# Patient Record
Sex: FEMALE | Race: BLACK OR AFRICAN AMERICAN | Employment: FULL TIME | URBAN - METROPOLITAN AREA
[De-identification: names, ages, dates, MRNs, and addresses within clinical notes are randomized per-mention and may not be internally consistent; named-entity substitution may affect disease eponyms.]

---

## 2017-02-18 ENCOUNTER — HOSPITAL ENCOUNTER (EMERGENCY)
Age: 62
Discharge: HOME | End: 2017-02-18
Attending: FAMILY MEDICINE

## 2017-02-18 VITALS
RESPIRATION RATE: 18 BRPM | HEIGHT: 66 IN | WEIGHT: 107 LBS | HEART RATE: 82 BPM | BODY MASS INDEX: 17.19 KG/M2 | OXYGEN SATURATION: 98 % | DIASTOLIC BLOOD PRESSURE: 94 MMHG | SYSTOLIC BLOOD PRESSURE: 141 MMHG | TEMPERATURE: 98.4 F

## 2017-02-18 DIAGNOSIS — J06.9 ACUTE UPPER RESPIRATORY INFECTION: Primary | ICD-10-CM

## 2017-02-18 RX ORDER — LORATADINE 10 MG/1
10 TABLET ORAL DAILY
Qty: 15 TAB | Refills: 0 | Status: SHIPPED | OUTPATIENT
Start: 2017-02-18 | End: 2020-02-24

## 2017-02-18 RX ORDER — BENZONATATE 100 MG/1
100 CAPSULE ORAL
Qty: 21 CAP | Refills: 0 | Status: SHIPPED | OUTPATIENT
Start: 2017-02-18 | End: 2017-02-25

## 2017-02-18 RX ORDER — FLUTICASONE PROPIONATE 50 MCG
2 SPRAY, SUSPENSION (ML) NASAL DAILY
Qty: 1 BOTTLE | Refills: 0 | Status: SHIPPED | OUTPATIENT
Start: 2017-02-18

## 2017-02-18 NOTE — DISCHARGE INSTRUCTIONS

## 2017-02-18 NOTE — UC PROVIDER NOTE
Patient is a 64 y.o. female presenting with nasal congestion. The history is provided by the patient. Nasal Congestion   This is a new problem. The current episode started yesterday. The problem occurs constantly. The problem has not changed since onset. Pertinent negatives include no headaches and no shortness of breath. Associated symptoms comments: Runny nose/ cough. The symptoms are aggravated by sneezing and coughing. Treatments tried: decongestants. The treatment provided no relief. Past Medical History   Diagnosis Date    Allergic rhinitis, cause unspecified     Arthritis      fingers and toes    Breast mass 06/1999     Right. Benign. due to Prempro. Dr. Beatriz Alvarado    Chickenpox childhood    Dizziness 10/2011     Dr. Sadnhya Connolly.  EBV positive mononucleosis syndrome 10/2010     positive titer.  Exposure to hepatitis B          Genital HSV     HA (headache)     Heart palpitations 05/21/09     Dr. Sharif Gaffney    Hypoglycemia     Menopausal and postmenopausal disorder 1999    MRSA infection 12/2011     Right hip. Txd Bactrim.  Osteopenia      Dr. Bertin Corey. Dr. Phillip Zepeda.  Other and unspecified hyperlipidemia     Raynaud phenomenon     RLS (restless legs syndrome) 2008    Shingles teenager     R arm    Vitamin D deficiency 2008        Past Surgical History   Procedure Laterality Date    Hx breast lumpectomy  1999     Benign. Dr. Delona Cabot Colonoscopy  09/19/2016     Dr. Hermann Byers. due q 10 yrs.  Hx orthopaedic Right 10/2012     Right arm. BENIGN TUMOR REMOVED.   Dr. María Hennessy         Family History   Problem Relation Age of Onset    Other Mother      gallstones    Breast Cancer Mother 52     unilateral.   Ramon Gurrolady Mother 62     Breast Cancer    Hypertension Father     Stroke Father 70     x2 CVAs and several TIAs    Kidney Disease Father     Asthma Paternal Uncle      x 2    Stroke Paternal Aunt     Heart Attack Paternal Aunt     Other Paternal Aunt      gout    Diabetes Paternal Aunt     Cancer Maternal Grandmother      uterine    Colon Cancer Maternal Grandmother 79    Cancer Maternal Aunt      lung    Cancer Other      maternal cousins x 2 with breast cancer. Ages 28, 50    Breast Cancer Other      x 2.  bilaterally. Social History     Social History    Marital status:      Spouse name: N/A    Number of children: N/A    Years of education: N/A     Occupational History    Not on file. Social History Main Topics    Smoking status: Former Smoker     Packs/day: 0.50     Years: 10.00     Types: Cigarettes     Quit date: 1/1/1985    Smokeless tobacco: Never Used    Alcohol use 1.5 oz/week     3 Standard drinks or equivalent per week      Comment: Occasional    Drug use: No    Sexual activity: Yes     Partners: Male     Birth control/ protection: None     Other Topics Concern    Not on file     Social History Narrative                ALLERGIES: Prempro [conj estrog-medroxyprogest ace]    Review of Systems   Respiratory: Negative for shortness of breath. Neurological: Negative for headaches. Vitals:    02/18/17 1122   BP: (!) 141/94   Pulse: 82   Resp: 18   Temp: 98.4 °F (36.9 °C)   SpO2: 98%   Weight: 48.5 kg (107 lb)   Height: 5' 6\" (1.676 m)       Physical Exam   Constitutional: No distress. HENT:   Right Ear: Tympanic membrane and ear canal normal.   Left Ear: Tympanic membrane and ear canal normal.   Nose: Nose normal.   Mouth/Throat: No oropharyngeal exudate, posterior oropharyngeal edema or posterior oropharyngeal erythema. Eyes: Conjunctivae are normal. Right eye exhibits no discharge. Left eye exhibits no discharge. Neck: Neck supple. Pulmonary/Chest: Effort normal and breath sounds normal. No respiratory distress. She has no wheezes. She has no rales. Lymphadenopathy:     She has no cervical adenopathy. Skin: No rash noted. Nursing note and vitals reviewed.       MDM     Differential Diagnosis; Clinical Impression; Plan:     CLINICAL IMPRESSION:  Acute upper respiratory infection  (primary encounter diagnosis)      Plan:    Fluids/ gargles  Claritin/ allegra   Tylenol cold-sinus - max strength 1-2 tab 4 times/ day    with Advil as needed  Use Mucinex DM twice a day    Amount and/or Complexity of Data Reviewed:    Review and summarize past medical records:  Yes  Risk of Significant Complications, Morbidity, and/or Mortality:   Presenting problems:  Low  Management options:  Low  Progress:   Patient progress:  Stable      Procedures

## 2017-03-28 ENCOUNTER — OFFICE VISIT (OUTPATIENT)
Dept: FAMILY MEDICINE CLINIC | Age: 62
End: 2017-03-28

## 2017-03-28 VITALS
BODY MASS INDEX: 17.15 KG/M2 | OXYGEN SATURATION: 98 % | HEIGHT: 66 IN | HEART RATE: 74 BPM | DIASTOLIC BLOOD PRESSURE: 78 MMHG | TEMPERATURE: 98.4 F | RESPIRATION RATE: 18 BRPM | WEIGHT: 106.7 LBS | SYSTOLIC BLOOD PRESSURE: 117 MMHG

## 2017-03-28 DIAGNOSIS — E55.9 VITAMIN D DEFICIENCY: ICD-10-CM

## 2017-03-28 DIAGNOSIS — R00.2 HEART PALPITATIONS: ICD-10-CM

## 2017-03-28 DIAGNOSIS — M85.80 OSTEOPENIA: ICD-10-CM

## 2017-03-28 DIAGNOSIS — G89.29 CHRONIC PAIN OF RIGHT KNEE: ICD-10-CM

## 2017-03-28 DIAGNOSIS — I10 ESSENTIAL HYPERTENSION WITH GOAL BLOOD PRESSURE LESS THAN 140/90: ICD-10-CM

## 2017-03-28 DIAGNOSIS — M25.561 CHRONIC PAIN OF RIGHT KNEE: ICD-10-CM

## 2017-03-28 DIAGNOSIS — Z82.3 FAMILY HISTORY OF STROKE: ICD-10-CM

## 2017-03-28 DIAGNOSIS — R63.6 UNDERWEIGHT: ICD-10-CM

## 2017-03-28 DIAGNOSIS — Z82.49 FAMILY HISTORY OF HEART ATTACK: ICD-10-CM

## 2017-03-28 DIAGNOSIS — Z80.3 FAMILY HISTORY OF BREAST CANCER: ICD-10-CM

## 2017-03-28 DIAGNOSIS — Z80.0 FAMILY HISTORY OF COLON CANCER: ICD-10-CM

## 2017-03-28 DIAGNOSIS — Z84.1 FAMILY HISTORY OF KIDNEY DISEASE: ICD-10-CM

## 2017-03-28 DIAGNOSIS — Z82.0 FAMILY HISTORY OF ALZHEIMER'S DISEASE: ICD-10-CM

## 2017-03-28 DIAGNOSIS — Z00.00 WELL WOMAN EXAM (NO GYNECOLOGICAL EXAM): Primary | ICD-10-CM

## 2017-03-28 DIAGNOSIS — E78.5 DYSLIPIDEMIA: ICD-10-CM

## 2017-03-28 RX ORDER — ERGOCALCIFEROL 1.25 MG/1
50000 CAPSULE ORAL
Qty: 4 CAP | Refills: 2 | Status: SHIPPED | OUTPATIENT
Start: 2017-03-28 | End: 2018-01-16 | Stop reason: ALTCHOICE

## 2017-03-28 RX ORDER — ASPIRIN 81 MG/1
81 TABLET ORAL DAILY
Qty: 90 TAB | Refills: 3
Start: 2017-03-28

## 2017-03-28 NOTE — PROGRESS NOTES
Chief Complaint   Patient presents with    Complete Physical     1. Have you been to the ER, urgent care clinic since your last visit? Hospitalized since your last visit? Yes, went to [address] [place] [address] for URI    2. Have you seen or consulted any other health care providers outside of the [address] [place] [address] since your last visit? Include any pap smears or colon screening. Yes, Pt had pap smear done in 01/2017. In the event something were to happen to you and you were unable to speak on your behalf, do you have an Advance Directive/ Living Will in place stating your wishes? NO    If yes, do we have a copy on file NO    If no, would you like information:    Pt offered and accepted information. Pt stated that she has no other concerns to talk to Dr. Ivory Sesay about today. Per Pt, having eye exam done in October 2017.

## 2017-03-28 NOTE — PATIENT INSTRUCTIONS
Knee Arthritis: Exercises  Your Care Instructions  Here are some examples of exercises for knee arthritis. Start each exercise slowly. Ease off the exercise if you start to have pain. Your doctor or physical therapist will tell you when you can start these exercises and which ones will work best for you. How to do the exercises  Knee flexion with heel slide    1. Lie on your back with your knees bent. 2. Slide your heel back by bending your affected knee as far as you can. Then hook your other foot around your ankle to help pull your heel even farther back. 3. Hold for about 6 seconds, then rest for up to 10 seconds. 4. Repeat 8 to 12 times. 5. Switch legs and repeat steps 1 through 4, even if only one knee is sore. Quad sets    1. Sit with your affected leg straight and supported on the floor or a firm bed. Place a small, rolled-up towel under your knee. Your other leg should be bent, with that foot flat on the floor. 2. Tighten the thigh muscles of your affected leg by pressing the back of your knee down into the towel. 3. Hold for about 6 seconds, then rest for up to 10 seconds. 4. Repeat 8 to 12 times. 5. Switch legs and repeat steps 1 through 4, even if only one knee is sore. Straight-leg raises to the front    1. Lie on your back with your good knee bent so that your foot rests flat on the floor. Your affected leg should be straight. Make sure that your low back has a normal curve. You should be able to slip your hand in between the floor and the small of your back, with your palm touching the floor and your back touching the back of your hand. 2. Tighten the thigh muscles in your affected leg by pressing the back of your knee flat down to the floor. Hold your knee straight. 3. Keeping the thigh muscles tight and your leg straight, lift your affected leg up so that your heel is about 12 inches off the floor. Hold for about 6 seconds, then lower slowly.   4. Relax for up to 10 seconds between repetitions. 5. Repeat 8 to 12 times. 6. Switch legs and repeat steps 1 through 5, even if only one knee is sore. Active knee flexion    1. Lie on your stomach with your knees straight. If your kneecap is uncomfortable, roll up a washcloth and put it under your leg just above your kneecap. 2. Lift the foot of your affected leg by bending the knee so that you bring the foot up toward your buttock. If this motion hurts, try it without bending your knee quite as far. This may help you avoid any painful motion. 3. Slowly move your leg up and down. 4. Repeat 8 to 12 times. 5. Switch legs and repeat steps 1 through 4, even if only one knee is sore. Quadriceps stretch (facedown)    1. Lie flat on your stomach, and rest your face on the floor. 2. Wrap a towel or belt strap around the lower part of your affected leg. Then use the towel or belt strap to slowly pull your heel toward your buttock until you feel a stretch. 3. Hold for about 15 to 30 seconds, then relax your leg against the towel or belt strap. 4. Repeat 2 to 4 times. 5. Switch legs and repeat steps 1 through 4, even if only one knee is sore. Stationary exercise bike    If you do not have a stationary exercise bike at home, you can find one to ride at your local health club or community center. 1. Adjust the height of the bike seat so that your knee is slightly bent when your leg is extended downward. If your knee hurts when the pedal reaches the top, you can raise the seat so that your knee does not bend as much. 2. Start slowly. At first, try to do 5 to 10 minutes of cycling with little to no resistance. Then increase your time and the resistance bit by bit until you can do 20 to 30 minutes without pain. 3. If you start to have pain, rest your knee until your pain gets back to the level that is normal for you. Or cycle for less time or with less effort. Follow-up care is a key part of your treatment and safety.  Be sure to make and go to all appointments, and call your doctor if you are having problems. It's also a good idea to know your test results and keep a list of the medicines you take. Where can you learn more? Go to http://marlo-estelita.info/. Enter C159 in the search box to learn more about \"Knee Arthritis: Exercises. \"  Current as of: May 23, 2016  Content Version: 11.1  © 2006-2016 [employer]. Care instructions adapted under license by [employer] (which disclaims liability or warranty for this information). If you have questions about a medical condition or this instruction, always ask your healthcare professional. [place] [address] any warranty or liability for your use of this information. Recommend AHA new dietary guidelines to improve weight, cardiovascular and general health. Limit daily added sugar consumption to 6 tsp/25 grams/100 tim/daily for women and 9 tsp/37 grams/150 tim/daily for men. Knee Arthritis: Exercises  Your Care Instructions  Here are some examples of exercises for knee arthritis. Start each exercise slowly. Ease off the exercise if you start to have pain. Your doctor or physical therapist will tell you when you can start these exercises and which ones will work best for you. How to do the exercises  Knee flexion with heel slide    6. Lie on your back with your knees bent. 7. Slide your heel back by bending your affected knee as far as you can. Then hook your other foot around your ankle to help pull your heel even farther back. 8. Hold for about 6 seconds, then rest for up to 10 seconds. 9. Repeat 8 to 12 times. 10. Switch legs and repeat steps 1 through 4, even if only one knee is sore. Quad sets    6. Sit with your affected leg straight and supported on the floor or a firm bed. Place a small, rolled-up towel under your knee. Your other leg should be bent, with that foot flat on the floor.   7. Tighten the thigh muscles of your affected leg by pressing the back of your knee down into the towel. 8. Hold for about 6 seconds, then rest for up to 10 seconds. 9. Repeat 8 to 12 times. 10. Switch legs and repeat steps 1 through 4, even if only one knee is sore. Straight-leg raises to the front    7. Lie on your back with your good knee bent so that your foot rests flat on the floor. Your affected leg should be straight. Make sure that your low back has a normal curve. You should be able to slip your hand in between the floor and the small of your back, with your palm touching the floor and your back touching the back of your hand. 8. Tighten the thigh muscles in your affected leg by pressing the back of your knee flat down to the floor. Hold your knee straight. 9. Keeping the thigh muscles tight and your leg straight, lift your affected leg up so that your heel is about 12 inches off the floor. Hold for about 6 seconds, then lower slowly. 10. Relax for up to 10 seconds between repetitions. 11. Repeat 8 to 12 times. 12. Switch legs and repeat steps 1 through 5, even if only one knee is sore. Active knee flexion    6. Lie on your stomach with your knees straight. If your kneecap is uncomfortable, roll up a washcloth and put it under your leg just above your kneecap. 7. Lift the foot of your affected leg by bending the knee so that you bring the foot up toward your buttock. If this motion hurts, try it without bending your knee quite as far. This may help you avoid any painful motion. 8. Slowly move your leg up and down. 9. Repeat 8 to 12 times. 10. Switch legs and repeat steps 1 through 4, even if only one knee is sore. Quadriceps stretch (facedown)    6. Lie flat on your stomach, and rest your face on the floor. 7. Wrap a towel or belt strap around the lower part of your affected leg. Then use the towel or belt strap to slowly pull your heel toward your buttock until you feel a stretch.   8. Hold for about 15 to 30 seconds, then relax your leg against the towel or belt strap. 9. Repeat 2 to 4 times. 10. Switch legs and repeat steps 1 through 4, even if only one knee is sore. Stationary exercise bike    If you do not have a stationary exercise bike at home, you can find one to ride at your local health club or community center. 4. Adjust the height of the bike seat so that your knee is slightly bent when your leg is extended downward. If your knee hurts when the pedal reaches the top, you can raise the seat so that your knee does not bend as much. 5. Start slowly. At first, try to do 5 to 10 minutes of cycling with little to no resistance. Then increase your time and the resistance bit by bit until you can do 20 to 30 minutes without pain. 6. If you start to have pain, rest your knee until your pain gets back to the level that is normal for you. Or cycle for less time or with less effort. Follow-up care is a key part of your treatment and safety. Be sure to make and go to all appointments, and call your doctor if you are having problems. It's also a good idea to know your test results and keep a list of the medicines you take. Where can you learn more? Go to http://marlo-estelita.info/. Enter C159 in the search box to learn more about \"Knee Arthritis: Exercises. \"  Current as of: May 23, 2016  Content Version: 11.1  © 4861-0237 [employer]. Care instructions adapted under license by [employer] (which disclaims liability or warranty for this information). If you have questions about a medical condition or this instruction, always ask your healthcare professional. [address] any warranty or liability for your use of this information.

## 2017-03-28 NOTE — MR AVS SNAPSHOT
Visit Information Date & Time Provider Department Dept. Phone Encounter #  
 3/28/2017  9:15 AM Joao Terrell DO [facility] [phone] [id number] Follow-up Instructions Return in about 6 months (around 9/28/2017) for bp check, weight. Upcoming Health Maintenance Date Due  
 BREAST CANCER SCRN MAMMOGRAM 3/28/2019 PAP AKA CERVICAL CYTOLOGY 3/28/2020 COLONOSCOPY 9/19/2026 DTaP/Tdap/Td series (2 - Td) 3/28/2027 Allergies as of 3/28/2017  Review Complete On: 3/28/2017 By: Glo Torres Severity Noted Reaction Type Reactions Prempro [Conj Estrog-medroxyprogest Ace]  08/08/2011    Other (comments) Benign Right breast mass. Current Immunizations  Reviewed on 3/28/2017 Name Date Influenza Vaccine 10/1/2016, 10/1/2014 Influenza Vaccine Split 10/21/2011, 10/18/2010 TD Vaccine 8/31/2004 Reviewed by Joao Terrell DO on 3/28/2017 at 10:49 AM  
You Were Diagnosed With   
  
 Codes Comments Well woman exam (no gynecological exam)    -  Primary ICD-10-CM: Z00.00 ICD-9-CM: V70.0 Essential hypertension with goal blood pressure less than 140/90     ICD-10-CM: I10 
ICD-9-CM: 401.9 stable Dyslipidemia     ICD-10-CM: E78.5 ICD-9-CM: 272.4 stable on Zocor 40 mg  
 Vitamin D deficiency     ICD-10-CM: E55.9 ICD-9-CM: 268.9 Underweight     ICD-10-CM: R63.6 ICD-9-CM: 783.22 within pt normal range x years Osteopenia     ICD-10-CM: M85.80 ICD-9-CM: 733.90 Heart palpitations     ICD-10-CM: R00.2 ICD-9-CM: 785.1 stable Family history of breast cancer     ICD-10-CM: Z80.3 ICD-9-CM: V16.3 Family history of colon cancer     ICD-10-CM: Z80.0 ICD-9-CM: V16.0 Family history of kidney disease     ICD-10-CM: Z84.1 ICD-9-CM: V18.69 Family history of heart attack     ICD-10-CM: Z82.49 
ICD-9-CM: V17.3 Family history of stroke     ICD-10-CM: Z82.3 ICD-9-CM: V17.1 Family history of Alzheimer's disease     ICD-10-CM: Z82.0 ICD-9-CM: V17.2 Chronic pain of right knee     ICD-10-CM: M25.561, G89.29 ICD-9-CM: 719.46, 338.29 >L, due to OA vs other Vitals BP Pulse Temp Resp Height(growth percentile) Weight(growth percentile) 117/78 (BP 1 Location: Right arm, BP Patient Position: Sitting) 74 98.4 °F (36.9 °C) (Oral) 18 5' 6\" (1.676 m) 106 lb 11.2 oz (48.4 kg) SpO2 BMI OB Status Smoking Status 98% 17.22 kg/m2 Postmenopausal Former Smoker BMI and BSA Data Body Mass Index Body Surface Area  
 17.22 kg/m 2 1.5 m 2 Preferred Pharmacy Pharmacy Name Phone [id number] [address] [address] [phone] Your Updated Medication List  
  
   
This list is accurate as of: 3/28/17 10:55 AM.  Always use your most recent med list.  
  
  
  
  
 aspirin delayed-release 81 mg tablet Take 1 Tab by mouth daily. cetirizine 10 mg tablet Commonly known as:  ZYRTEC Take  by mouth.  
  
 ergocalciferol 50,000 unit capsule Commonly known as:  ERGOCALCIFEROL Take 1 Cap by mouth every seven (7) days. FISH OIL 1,000 mg Cap Generic drug:  omega-3 fatty acids-vitamin e Take  by mouth daily. fluticasone 50 mcg/actuation nasal spray Commonly known as:  Montine [place] 2 Sprays by Both Nostrils route daily. lisinopril 20 mg tablet Commonly known as:  PRINIVIL, ZESTRIL  
TAKE 1 TABLET BY MOUTH DAILY  
  
 loratadine 10 mg tablet Commonly known as:  Shellye Drape Take 1 Tab by mouth daily. montelukast 10 mg tablet Commonly known as:  SINGULAIR Take 1 Tab by mouth daily. MULTIVITAMIN PO Take  by mouth. Takes 3 times/week  
  
 pantoprazole 40 mg tablet Commonly known as:  PROTONIX  
TAKE 1 TABLET BY MOUTH EVERY DAY  
  
 simvastatin 40 mg tablet Commonly known as:  ZOCOR  
TAKE 1 TABLET BY MOUTH AT BEDTIME  
  
 valACYclovir 500 mg tablet Commonly known as:  VALTREX  
TAKE 1 TABLET BY MOUTH EVERY DAY DURING FLARES  
  
 VITAMIN B-12 1,000 mcg tablet Generic drug:  cyanocobalamin Take 1,000 mcg by mouth daily. VITAMIN C 500 mg tablet Generic drug:  ascorbic acid (vitamin C) Take  by mouth. Prescriptions Sent to Pharmacy Refills  
 ergocalciferol (ERGOCALCIFEROL) 50,000 unit capsule 2 Sig: Take 1 Cap by mouth every seven (7) days. Class: Normal  
 Pharmacy: [facility] #: [phone] Route: Oral  
  
Follow-up Instructions Return in about 6 months (around 9/28/2017) for bp check, weight. Patient Instructions Knee Arthritis: Exercises Your Care Instructions Here are some examples of exercises for knee arthritis. Start each exercise slowly. Ease off the exercise if you start to have pain. Your doctor or physical therapist will tell you when you can start these exercises and which ones will work best for you. How to do the exercises Knee flexion with heel slide 1. Lie on your back with your knees bent. 2. Slide your heel back by bending your affected knee as far as you can. Then hook your other foot around your ankle to help pull your heel even farther back. 3. Hold for about 6 seconds, then rest for up to 10 seconds. 4. Repeat 8 to 12 times. 5. Switch legs and repeat steps 1 through 4, even if only one knee is sore. [facility] 1. Sit with your affected leg straight and supported on the floor or a firm bed. Place a small, rolled-up towel under your knee. Your other leg should be bent, with that foot flat on the floor. 2. Tighten the thigh muscles of your affected leg by pressing the back of your knee down into the towel. 3. Hold for about 6 seconds, then rest for up to 10 seconds. 4. Repeat 8 to 12 times. 5. Switch legs and repeat steps 1 through 4, even if only one knee is sore. Straight-leg raises to the front 1. Lie on your back with your good knee bent so that your foot rests flat on the floor. Your affected leg should be straight. Make sure that your low back has a normal curve. You should be able to slip your hand in between the floor and the small of your back, with your palm touching the floor and your back touching the back of your hand. 2. Tighten the thigh muscles in your affected leg by pressing the back of your knee flat down to the floor. Hold your knee straight. 3. Keeping the thigh muscles tight and your leg straight, lift your affected leg up so that your heel is about 12 inches off the floor. Hold for about 6 seconds, then lower slowly. 4. Relax for up to 10 seconds between repetitions. 5. Repeat 8 to 12 times. 6. Switch legs and repeat steps 1 through 5, even if only one knee is sore. Active knee flexion 1. Lie on your stomach with your knees straight. If your kneecap is uncomfortable, roll up a washcloth and put it under your leg just above your kneecap. 2. Lift the foot of your affected leg by bending the knee so that you bring the foot up toward your buttock. If this motion hurts, try it without bending your knee quite as far. This may help you avoid any painful motion. 3. Slowly move your leg up and down. 4. Repeat 8 to 12 times. 5. Switch legs and repeat steps 1 through 4, even if only one knee is sore. Quadriceps stretch (facedown) 1. Lie flat on your stomach, and rest your face on the floor. 2. Wrap a towel or belt strap around the lower part of your affected leg. Then use the towel or belt strap to slowly pull your heel toward your buttock until you feel a stretch. 3. Hold for about 15 to 30 seconds, then relax your leg against the towel or belt strap. 4. Repeat 2 to 4 times. 5. Switch legs and repeat steps 1 through 4, even if only one knee is sore. Stationary exercise bike If you do not have a stationary exercise bike at home, you can find one to ride at your local health club or community center. 1. Adjust the height of the bike seat so that your knee is slightly bent when your leg is extended downward. If your knee hurts when the pedal reaches the top, you can raise the seat so that your knee does not bend as much. 2. Start slowly. At first, try to do 5 to 10 minutes of cycling with little to no resistance. Then increase your time and the resistance bit by bit until you can do 20 to 30 minutes without pain. 3. If you start to have pain, rest your knee until your pain gets back to the level that is normal for you. Or cycle for less time or with less effort. Follow-up care is a key part of your treatment and safety. Be sure to make and go to all appointments, and call your doctor if you are having problems. It's also a good idea to know your test results and keep a list of the medicines you take. Where can you learn more? Go to http://marlo-estelita.info/. Enter C159 in the search box to learn more about \"Knee Arthritis: Exercises. \" Current as of: May 23, 2016 Content Version: 11.1 © 5329-9527 [employer], [employer]. Care instructions adapted under license by [employer]ef[employer] (which disclaims liability or warranty for this information). If you have questions about a medical condition or this instruction, always ask your healthcare professional. [address] any warranty or liability for your use of this information. Recommend AHA new dietary guidelines to improve weight, cardiovascular and general health. Limit daily added sugar consumption to 6 tsp/25 grams/100 tim/daily for women and 9 tsp/37 grams/150 tim/daily for men. Knee Arthritis: Exercises Your Care Instructions Here are some examples of exercises for knee arthritis.  Start each exercise slowly. Ease off the exercise if you start to have pain. Your doctor or physical therapist will tell you when you can start these exercises and which ones will work best for you. How to do the exercises Knee flexion with heel slide 6. Lie on your back with your knees bent. 7. Slide your heel back by bending your affected knee as far as you can. Then hook your other foot around your ankle to help pull your heel even farther back. 8. Hold for about 6 seconds, then rest for up to 10 seconds. 9. Repeat 8 to 12 times. 10. Switch legs and repeat steps 1 through 4, even if only one knee is sore. [employer] 6. Sit with your affected leg straight and supported on the floor or a firm bed. Place a small, rolled-up towel under your knee. Your other leg should be bent, with that foot flat on the floor. 7. Tighten the thigh muscles of your affected leg by pressing the back of your knee down into the towel. 8. Hold for about 6 seconds, then rest for up to 10 seconds. 9. Repeat 8 to 12 times. 10. Switch legs and repeat steps 1 through 4, even if only one knee is sore. Straight-leg raises to the front 7. Lie on your back with your good knee bent so that your foot rests flat on the floor. Your affected leg should be straight. Make sure that your low back has a normal curve. You should be able to slip your hand in between the floor and the small of your back, with your palm touching the floor and your back touching the back of your hand. 8. Tighten the thigh muscles in your affected leg by pressing the back of your knee flat down to the floor. Hold your knee straight. 9. Keeping the thigh muscles tight and your leg straight, lift your affected leg up so that your heel is about 12 inches off the floor. Hold for about 6 seconds, then lower slowly. 10. Relax for up to 10 seconds between repetitions. 11. Repeat 8 to 12 times. 12. Switch legs and repeat steps 1 through 5, even if only one knee is sore. Active knee flexion 6. Lie on your stomach with your knees straight. If your kneecap is uncomfortable, roll up a washcloth and put it under your leg just above your kneecap. 7. Lift the foot of your affected leg by bending the knee so that you bring the foot up toward your buttock. If this motion hurts, try it without bending your knee quite as far. This may help you avoid any painful motion. 8. Slowly move your leg up and down. 9. Repeat 8 to 12 times. 10. Switch legs and repeat steps 1 through 4, even if only one knee is sore. Quadriceps stretch (facedown) 6. Lie flat on your stomach, and rest your face on the floor. 7. Wrap a towel or belt strap around the lower part of your affected leg. Then use the towel or belt strap to slowly pull your heel toward your buttock until you feel a stretch. 8. Hold for about 15 to 30 seconds, then relax your leg against the towel or belt strap. 9. Repeat 2 to 4 times. 10. Switch legs and repeat steps 1 through 4, even if only one knee is sore. Stationary exercise bike If you do not have a stationary exercise bike at home, you can find one to ride at your local health club or community center. 4. Adjust the height of the bike seat so that your knee is slightly bent when your leg is extended downward. If your knee hurts when the pedal reaches the top, you can raise the seat so that your knee does not bend as much. 5. Start slowly. At first, try to do 5 to 10 minutes of cycling with little to no resistance. Then increase your time and the resistance bit by bit until you can do 20 to 30 minutes without pain. 6. If you start to have pain, rest your knee until your pain gets back to the level that is normal for you. Or cycle for less time or with less effort. Follow-up care is a key part of your treatment and safety.  Be sure to make and go to all appointments, and call your doctor if you are having problems. It's also a good idea to know your test results and keep a list of the medicines you take. Where can you learn more? Go to http://marlo-estelita.info/. Enter C159 in the search box to learn more about \"Knee Arthritis: Exercises. \" Current as of: May 23, 2016 Content Version: 11.1 © 0228-0459 [employer] software. Care instructions adapted under license by [employer] (which disclaims liability or warranty for this information). If you have questions about a medical condition or this instruction, always ask your healthcare professional. [address] any warranty or liability for your use of this information. Introducing [facility]! Dear Tati Krishna: Thank you for requesting a [employer] account. Our records indicate that you already have an active [employer] account. You can access your account anytime at [email or web address] Did you know that you can access your hospital and ER discharge instructions at any time in [employer]? You can also review all of your test results from your hospital stay or ER visit. Additional Information If you have questions, please visit the Frequently Asked Questions section of the [employer] website at [email or web address] Remember, [employer] is NOT to be used for urgent needs. For medical emergencies, dial 911. Now available from your iPhone and Android! Please provide this summary of care documentation to your next provider. Your primary care clinician is listed as [place]. If you have any questions after today's visit, please call [phone].

## 2017-03-28 NOTE — ACP (ADVANCE CARE PLANNING)
Discussed ACP with patient. [facility]ave pt an [facility] [employer] [facility]. Patient prefers to read it on her own. Declines referral to [facility] team at this time. Patient will bring document to her next office visit or attach it to her MyChart record.

## 2017-03-28 NOTE — PROGRESS NOTES
HISTORY OF PRESENT ILLNESS  Jackie Gunter is a 64 y.o. female here for an annual physical exam    Agree with nurse note. Health Maintenance    Hypertensive pt with dyslipidemia, Vit D deficiency, hx of heart palpitations, and family hx of DM, heart disease, stroke, Alzheimer's, and kidney disease presents to the office with a BP of 117/78. For BP, she takes Lisinopril 20 mg daily, tolerating well. Patient denies vision changes, headaches, dizziness, chest pain, SOB, or swelling. She requests her most recent labs from 3/21/17. Urine microalbumin was <3.0. Hep C screening was negative. Hgb A1C was 5.1. Vit D was 26.2, down from 45.9. TSH was 1.48. CMP was WNL. LDL was 94. For cholesterol, she takes Zocor 40 mg daily, tolerating well. She also takes Aspirin 81 mg daily. Pt brought in snapshot of a health screening from [facility]. BP was 129/33. Weight was 107.4 lbs. Non-fasting glucose was 66. Waist measurement was 29.5\". BMI was 19. HDL was 66. Non-HDL was 99. Pt weighs 106 lbs, lost 4 lbs since last ov. Her highest weight was 112 lbs and lowest is today. She does not skip meals and eats all the time. Denies anorexia or bulimia. She has a frame like her dad who was tall and thin. Feels great today. Pt continues with BL knee pain, R>L. Pain comes and goes. No known trauma. Patient denies low back, hip, knee, ankle and foot pain, swelling, skin changes, numbness or tingling. She takes Tylenol prn with some relief. Pt with family hx of colon cancer. Her most recent colonoscopy was on 09/19/16 with GI, Dr. Heidi Hope. F/U 10 years. Pt denies GI complaints. Pt reports having an eye exam with optometrist, Dr. Yosef Marshall. No eye concerns. Pt reports having a pap smear in 01/2017 with GYN, Dr. Enmanuel Rollins. She has a hx of osteopenia and Dr. Brayan Clement ordered a DEXA scan, which she will have performed soon. Pt with family hx of breast cancer.  She reports having a mammogram at [facility]. Written by shawna Rinaldi, as dictated by Dr. Lydia Martinez DO.    ROS    Review of Systems is negative except as mentioned above in HPI. ALLERGIES:    Allergies   Allergen Reactions    Prempro [Conj Estrog-Medroxyprogest Ace] Other (comments)     Benign Right breast mass. CURRENT MEDICATIONS:    Outpatient Prescriptions Marked as Taking for the 3/28/17 encounter (Office Visit) with Uriel Herman DO   Medication Sig Dispense Refill    aspirin delayed-release 81 mg tablet Take 1 Tab by mouth daily. 90 Tab 3    ergocalciferol (ERGOCALCIFEROL) 50,000 unit capsule Take 1 Cap by mouth every seven (7) days. 4 Cap 2    fluticasone (FLONASE) 50 mcg/actuation nasal spray 2 Sprays by Both Nostrils route daily. 1 Bottle 0    pantoprazole (PROTONIX) 40 mg tablet TAKE 1 TABLET BY MOUTH EVERY DAY 90 Tab 1    valACYclovir (VALTREX) 500 mg tablet TAKE 1 TABLET BY MOUTH EVERY DAY DURING FLARES 90 Tab 0    lisinopril (PRINIVIL, ZESTRIL) 20 mg tablet TAKE 1 TABLET BY MOUTH DAILY 30 Tab 2    simvastatin (ZOCOR) 40 mg tablet TAKE 1 TABLET BY MOUTH AT BEDTIME 90 tablet 3    cyanocobalamin (VITAMIN B-12) 1,000 mcg tablet Take 1,000 mcg by mouth daily.  ascorbic acid (VITAMIN C) 500 mg tablet Take  by mouth.  omega-3 fatty acids-vitamin e (FISH OIL) 1,000 mg Cap Take  by mouth daily.  MULTIVITAMINS (MULTIVITAMIN PO) Take  by mouth. Takes 3 times/week         PAST MEDICAL HISTORY:    Past Medical History:   Diagnosis Date    Allergic rhinitis, cause unspecified     Arthritis     fingers and toes    Breast mass 06/1999    Right. Benign. due to Prempro. Dr. Caldwell Marking    Chickenpox childhood    Dizziness 10/2011    Dr. Chris Ny.  EBV positive mononucleosis syndrome 10/2010    positive titer.     Exposure to hepatitis B         Genital HSV     HA (headache)     Heart palpitations 05/21/09    Dr. Andrei Steen    Hypoglycemia  Menopausal and postmenopausal disorder 1999    MRSA infection 12/2011    Right hip. Txd Bactrim.  Osteopenia     Dr. Valerie Ruiz. Dr. Luis Vyas.  Other and unspecified hyperlipidemia     Raynaud phenomenon     RLS (restless legs syndrome) 2008    Shingles teenager    R arm    Vitamin D deficiency 2008       PAST SURGICAL HISTORY:    Past Surgical History:   Procedure Laterality Date    COLONOSCOPY  09/19/2016    Dr. Shabnam Barrow. due q 10 yrs.  HX BREAST LUMPECTOMY  1999    Benign. Dr. Gabriel Pinto HX ORTHOPAEDIC Right 10/2012    Right arm. BENIGN TUMOR REMOVED. Dr. Zarate Jobs:    Family History   Problem Relation Age of Onset    Other Mother      gallstones    Breast Cancer Mother 52     unilateral.   [place]il Copas Mother 62     Breast Cancer    Hypertension Father     Stroke Father 70     x2 CVAs and several TIAs    Kidney Disease Father     Asthma Paternal Uncle      x 2    Stroke Paternal Aunt     Diabetes Paternal Aunt     Heart Attack Paternal Aunt     Other Paternal Aunt      gout    Diabetes Paternal Aunt     Cancer Maternal Grandmother      uterine    Colon Cancer Maternal Grandmother 79    Cancer Maternal Aunt      lung    Cancer Other      maternal cousins     Breast Cancer Other 28     x 2.  bilaterally.     Alzheimer Maternal Aunt      x 2       SOCIAL HISTORY:    Social History     Social History    Marital status:      Spouse name: N/A    Number of children: N/A    Years of education: N/A     Social History Main Topics    Smoking status: Former Smoker     Packs/day: 0.50     Years: 10.00     Types: Cigarettes     Quit date: 1/1/1985    Smokeless tobacco: Never Used    Alcohol use 1.5 oz/week     3 Standard drinks or equivalent per week      Comment: Occasional    Drug use: No    Sexual activity: Yes     Partners: Male     Birth control/ protection: None     Other Topics Concern    None     Social History Narrative IMMUNIZATIONS:    Immunization History   Administered Date(s) Administered    Influenza Vaccine 10/01/2014, 10/01/2016    Influenza Vaccine Split 10/18/2010, 10/21/2011    TD Vaccine 08/31/2004         PHYSICAL EXAMINATION    Vital signs     Visit Vitals    /78 (BP 1 Location: Right arm, BP Patient Position: Sitting)    Pulse 74    Temp 98.4 °F (36.9 °C) (Oral)    Resp 18    Ht 5' 6\" (1.676 m)    Wt 106 lb 11.2 oz (48.4 kg)    SpO2 98%    BMI 17.22 kg/m2        General appearance - Well nourished. Well appearing. Well developed. No acute distress. Underweight. Head - Normocephalic. Atraumatic. Non tender sinuses x 4. Eyes - pupils equal and reactive, extraocular eye movements intact, sclera anicteric. Mildly injected sclera. Ears - Hearing is grossly normal bilaterally. Moderate TM retraction BL. External ear canals normal without evidence of blood or swelling. Nose - normal and patent. No erythema or turbinate edema. No discharge. No polyps. Mouth - mucous membranes with adequate moisture. Posterior pharynx normal without lesions, white exudate, or obstruction. Neck - supple. Midline trachea. No carotid bruits are noted. No thyromegaly noted. Neck is supple without rigidity. Chest - clear to auscultation bilaterally anterriorly and posteriorly. No wheezes, rales or rhonchi. Breath sounds are symmetrical bilaterally. Unlabored respirations. Heart - normal rate. Regular rhythm. Normal S1, S2. No murmurs. No rubs, clicks or gallops noted. Abdomen - soft and nondistended. No masses or organomegaly. No rebound, rigidity or guarding. Bowel sounds normal x 4 quadrants. No tenderness noted. Back exam - normal range of motion. No pain on palpation of the spinous processes in the cervical, thoracic, lumbar, sacral regions. No CVA tenderness. Neurological - awake, alert and oriented to person, place, and time and event. Cranial nerves II through XII intact. No focal findings. Clear speech. Muscle strength is +5/5 x 4 extremities. Sensation is intact to light touch bilaterally. Steady gait. Musculoskeletal - Intact x 4 extremities. Full ROM x 4 extremities. No pain with movement. No pain on palpation of the bilateral shoulders, elbows, wrists, hands. No tenderness in the pelvis, pubic bone, bilateral hips, knees, ankles. No obvious deformity  Heme/Lymph - peripheral pulses normal x 4 extremities. No peripheral edema is noted. No cervical adenopathy noted. Skin - no rashes, erythema, ecchymosis, lacerations, abrasions, suspicious moles  Psychological -   normal behavior, dress and thought processes. Good insight. Good eye contact. Normal affect. Appropriate mood. Normal speech. DATA REVIEWED    Results for orders placed or performed in visit on 09/26/16   LIPID PANEL   Result Value Ref Range    Cholesterol, total 177 100 - 199 mg/dL    Triglyceride 58 0 - 149 mg/dL    HDL Cholesterol 71 >39 mg/dL    VLDL, calculated 12 5 - 40 mg/dL    LDL, calculated 94 0 - 99 mg/dL   METABOLIC PANEL, COMPREHENSIVE   Result Value Ref Range    Glucose 77 65 - 99 mg/dL    BUN 11 8 - 27 mg/dL    Creatinine 0.86 0.57 - 1.00 mg/dL    GFR est non-AA 73 >59 mL/min/1.73    GFR est AA 84 >59 mL/min/1.73    BUN/Creatinine ratio 13 11 - 26    Sodium 141 134 - 144 mmol/L    Potassium 4.6 3.5 - 5.2 mmol/L    Chloride 100 96 - 106 mmol/L    CO2 24 18 - 29 mmol/L    Calcium 9.4 8.7 - 10.3 mg/dL    Protein, total 7.1 6.0 - 8.5 g/dL    Albumin 4.8 3.6 - 4.8 g/dL    GLOBULIN, TOTAL 2.3 1.5 - 4.5 g/dL    A-G Ratio 2.1 1.2 - 2.2    Bilirubin, total 0.7 0.0 - 1.2 mg/dL    Alk.  phosphatase 71 39 - 117 IU/L    AST (SGOT) 14 0 - 40 IU/L    ALT (SGPT) 10 0 - 32 IU/L   TSH 3RD GENERATION   Result Value Ref Range    TSH 1.480 0.450 - 4.500 uIU/mL   URINALYSIS W/ RFLX MICROSCOPIC   Result Value Ref Range    Specific Gravity 1.009 1.005 - 1.030    pH (UA) 6.0 5.0 - 7.5    Color Yellow Yellow    Appearance Clear Clear    Leukocyte Esterase Trace (A) Negative    Protein Negative Negative/Trace    Glucose Negative Negative    Ketone Negative Negative    Blood Negative Negative    Bilirubin Negative Negative    Urobilinogen 0.2 0.2 - 1.0 mg/dL    Nitrites Negative Negative    Microscopic Examination See additional order    MICROALBUMIN, UR, RAND W/ MICROALBUMIN/CREA RATIO   Result Value Ref Range    Creatinine, urine 55.4 Not Estab. mg/dL    Microalbumin, urine <3.0 Not Estab. ug/mL    Microalb/Creat ratio (ug/mg creat.) <5.4 0.0 - 30.0 mg/g creat   VITAMIN D, 25 HYDROXY   Result Value Ref Range    VITAMIN D, 25-HYDROXY 26.2 (L) 30.0 - 100.0 ng/mL   HEMOGLOBIN A1C WITH EAG   Result Value Ref Range    Hemoglobin A1c 5.1 4.8 - 5.6 %    Estimated average glucose 100 mg/dL   HEPATITIS C AB   Result Value Ref Range    Hep C Virus Ab <0.1 0.0 - 0.9 s/co ratio   CVD REPORT   Result Value Ref Range    INTERPRETATION Note    MICROSCOPIC EXAMINATION   Result Value Ref Range    WBC 0-5 0 - 5 /hpf    RBC 0-2 0 - 2 /hpf    Epithelial cells 0-10 0 - 10 /hpf    Casts None seen None seen /lpf    Mucus Present Not Estab. Bacteria Few None seen/Few       ASSESSMENT and PLAN    ICD-10-CM ICD-9-CM    1. Well woman exam (no gynecological exam) Z00.00 V70.0 aspirin delayed-release 81 mg tablet   2. Essential hypertension with goal blood pressure less than 140/90 I10 401.9 aspirin delayed-release 81 mg tablet    stable   3. Dyslipidemia E78.5 272.4 aspirin delayed-release 81 mg tablet    stable on Zocor 40 mg   4. Vitamin D deficiency E55.9 268.9 ergocalciferol (ERGOCALCIFEROL) 50,000 unit capsule   5. Underweight R63.6 783.22     within pt normal range x years   6. Osteopenia M85.80 733.90    7. Heart palpitations R00.2 785.1     stable   8. Family history of breast cancer Z80.3 V16.3    9. Family history of colon cancer Z80.0 V16.0    10. Family history of kidney disease Z84.1 V18.69    11.  Family history of heart attack Z82.49 V17.3    12. Family history of stroke Z82.3 V17.1    13. Family history of Alzheimer's disease Z82.0 V17.2    14. Chronic pain of right knee M25.561 719.46     G89.29 338.29     >L, due to OA vs other     See ophthalmologist every 2 to 3 years unless otherwise directed. See dentist every 6 months. See dermatologist for annual check. Continue current medications. When pt uses allergy meds, recommend she take OTC Zyrtec and Singulair at bedtime. OTC Claritin and Flonase in the morning. Start Rx Vitamin D 50,000IU weekly x3 months and then take OTC Vitamin D 5,000IU daily. Prescriptions written and sent to pharmacist or given to patient. Vit D 50,000IU. Chart reviewed and updated. Specialist notes reviewed and appreciated. Get ov notes from [facility]'s immunization record, Dr. Dustin Olivera, and [facility]. Advised pt to sign medical release form at check out today. Discussed recent test results and goals with patient. Recent colonoscopy, PAP and mammograms reviewed. Recheck as instructed by specialists. Immunizations are noted. Administered TDAP today. Advised balanced diet and exercise. Proper rest.  Increase water and fiber. Avoid tobacco.  Decrease alcohol and caffeine. Decrease carbohydrates, increase green leafy vegetables and protein. Increase water intake. Eat 3-5 small meals daily. Increase physical activity. Relevant handouts provided and discussed with pt. Counselled pt on:  Patient health concerns. BP, Vit D deficiency, cholesterol, and knee care. Advance Care Booklet given at office visit. Discussed with pt today. Declines honoring choices referral.   Discussed the patient's BMI with her. The BMI follow up plan is as follows: Pt not eligible for BMI calculation due to underweight. Follow-up Disposition:  Return in about 6 months (around 9/28/2017) for bp check, weight. Patient was offered a choice/choices in the treatment plan today.   Patient expresses understanding of the plan and agrees with recommendations. Written by shawna Cooper, as dictated by Dr. Wilner Diaz DO. Documentation True and Accepted by Sonya Ashby Check. Patient Instructions        Knee Arthritis: Exercises  Your Care Instructions  Here are some examples of exercises for knee arthritis. Start each exercise slowly. Ease off the exercise if you start to have pain. Your doctor or physical therapist will tell you when you can start these exercises and which ones will work best for you. How to do the exercises  Knee flexion with heel slide    1. Lie on your back with your knees bent. 2. Slide your heel back by bending your affected knee as far as you can. Then hook your other foot around your ankle to help pull your heel even farther back. 3. Hold for about 6 seconds, then rest for up to 10 seconds. 4. Repeat 8 to 12 times. 5. Switch legs and repeat steps 1 through 4, even if only one knee is sore. Quad sets    1. Sit with your affected leg straight and supported on the floor or a firm bed. Place a small, rolled-up towel under your knee. Your other leg should be bent, with that foot flat on the floor. 2. Tighten the thigh muscles of your affected leg by pressing the back of your knee down into the towel. 3. Hold for about 6 seconds, then rest for up to 10 seconds. 4. Repeat 8 to 12 times. 5. Switch legs and repeat steps 1 through 4, even if only one knee is sore. Straight-leg raises to the front    1. Lie on your back with your good knee bent so that your foot rests flat on the floor. Your affected leg should be straight. Make sure that your low back has a normal curve. You should be able to slip your hand in between the floor and the small of your back, with your palm touching the floor and your back touching the back of your hand. 2. Tighten the thigh muscles in your affected leg by pressing the back of your knee flat down to the floor.  Hold your knee straight. 3. Keeping the thigh muscles tight and your leg straight, lift your affected leg up so that your heel is about 12 inches off the floor. Hold for about 6 seconds, then lower slowly. 4. Relax for up to 10 seconds between repetitions. 5. Repeat 8 to 12 times. 6. Switch legs and repeat steps 1 through 5, even if only one knee is sore. Active knee flexion    1. Lie on your stomach with your knees straight. If your kneecap is uncomfortable, roll up a washcloth and put it under your leg just above your kneecap. 2. Lift the foot of your affected leg by bending the knee so that you bring the foot up toward your buttock. If this motion hurts, try it without bending your knee quite as far. This may help you avoid any painful motion. 3. Slowly move your leg up and down. 4. Repeat 8 to 12 times. 5. Switch legs and repeat steps 1 through 4, even if only one knee is sore. Quadriceps stretch (facedown)    1. Lie flat on your stomach, and rest your face on the floor. 2. Wrap a towel or belt strap around the lower part of your affected leg. Then use the towel or belt strap to slowly pull your heel toward your buttock until you feel a stretch. 3. Hold for about 15 to 30 seconds, then relax your leg against the towel or belt strap. 4. Repeat 2 to 4 times. 5. Switch legs and repeat steps 1 through 4, even if only one knee is sore. Stationary exercise bike    If you do not have a stationary exercise bike at home, you can find one to ride at your local health club or community center. 1. Adjust the height of the bike seat so that your knee is slightly bent when your leg is extended downward. If your knee hurts when the pedal reaches the top, you can raise the seat so that your knee does not bend as much. 2. Start slowly. At first, try to do 5 to 10 minutes of cycling with little to no resistance.  Then increase your time and the resistance bit by bit until you can do 20 to 30 minutes without pain.  3. If you start to have pain, rest your knee until your pain gets back to the level that is normal for you. Or cycle for less time or with less effort. Follow-up care is a key part of your treatment and safety. Be sure to make and go to all appointments, and call your doctor if you are having problems. It's also a good idea to know your test results and keep a list of the medicines you take. Where can you learn more? Go to http://marlo-estelita.info/. Enter C159 in the search box to learn more about \"Knee Arthritis: Exercises. \"  Current as of: May 23, 2016  Content Version: 11.1  © 3803-1114 [employer]. Care instructions adapted under license by [employer] (which disclaims liability or warranty for this information). If you have questions about a medical condition or this instruction, always ask your healthcare professional. [place] [address] any warranty or liability for your use of this information. Recommend AHA new dietary guidelines to improve weight, cardiovascular and general health. Limit daily added sugar consumption to 6 tsp/25 grams/100 tim/daily for women and 9 tsp/37 grams/150 tim/daily for men. Knee Arthritis: Exercises  Your Care Instructions  Here are some examples of exercises for knee arthritis. Start each exercise slowly. Ease off the exercise if you start to have pain. Your doctor or physical therapist will tell you when you can start these exercises and which ones will work best for you. How to do the exercises  Knee flexion with heel slide    6. Lie on your back with your knees bent. 7. Slide your heel back by bending your affected knee as far as you can. Then hook your other foot around your ankle to help pull your heel even farther back. 8. Hold for about 6 seconds, then rest for up to 10 seconds. 9. Repeat 8 to 12 times. 10. Switch legs and repeat steps 1 through 4, even if only one knee is sore.   [facility] [employer] sets    6. Sit with your affected leg straight and supported on the floor or a firm bed. Place a small, rolled-up towel under your knee. Your other leg should be bent, with that foot flat on the floor. 7. Tighten the thigh muscles of your affected leg by pressing the back of your knee down into the towel. 8. Hold for about 6 seconds, then rest for up to 10 seconds. 9. Repeat 8 to 12 times. 10. Switch legs and repeat steps 1 through 4, even if only one knee is sore. Straight-leg raises to the front    7. Lie on your back with your good knee bent so that your foot rests flat on the floor. Your affected leg should be straight. Make sure that your low back has a normal curve. You should be able to slip your hand in between the floor and the small of your back, with your palm touching the floor and your back touching the back of your hand. 8. Tighten the thigh muscles in your affected leg by pressing the back of your knee flat down to the floor. Hold your knee straight. 9. Keeping the thigh muscles tight and your leg straight, lift your affected leg up so that your heel is about 12 inches off the floor. Hold for about 6 seconds, then lower slowly. 10. Relax for up to 10 seconds between repetitions. 11. Repeat 8 to 12 times. 12. Switch legs and repeat steps 1 through 5, even if only one knee is sore. Active knee flexion    6. Lie on your stomach with your knees straight. If your kneecap is uncomfortable, roll up a washcloth and put it under your leg just above your kneecap. 7. Lift the foot of your affected leg by bending the knee so that you bring the foot up toward your buttock. If this motion hurts, try it without bending your knee quite as far. This may help you avoid any painful motion. 8. Slowly move your leg up and down. 9. Repeat 8 to 12 times. 10. Switch legs and repeat steps 1 through 4, even if only one knee is sore. Quadriceps stretch (facedown)    6.  Lie flat on your stomach, and rest your face on the floor. 7. Wrap a towel or belt strap around the lower part of your affected leg. Then use the towel or belt strap to slowly pull your heel toward your buttock until you feel a stretch. 8. Hold for about 15 to 30 seconds, then relax your leg against the towel or belt strap. 9. Repeat 2 to 4 times. 10. Switch legs and repeat steps 1 through 4, even if only one knee is sore. Stationary exercise bike    If you do not have a stationary exercise bike at home, you can find one to ride at your local health club or community center. 4. Adjust the height of the bike seat so that your knee is slightly bent when your leg is extended downward. If your knee hurts when the pedal reaches the top, you can raise the seat so that your knee does not bend as much. 5. Start slowly. At first, try to do 5 to 10 minutes of cycling with little to no resistance. Then increase your time and the resistance bit by bit until you can do 20 to 30 minutes without pain. 6. If you start to have pain, rest your knee until your pain gets back to the level that is normal for you. Or cycle for less time or with less effort. Follow-up care is a key part of your treatment and safety. Be sure to make and go to all appointments, and call your doctor if you are having problems. It's also a good idea to know your test results and keep a list of the medicines you take. Where can you learn more? Go to http://marlo-estelita.info/. Enter C159 in the search box to learn more about \"Knee Arthritis: Exercises. \"  Current as of: May 23, 2016  Content Version: 11.1  © 7962-5068 [employer]. Care instructions adapted under license by [employer] (which disclaims liability or warranty for this information).  If you have questions about a medical condition or this instruction, always ask your healthcare professional. [address] any warranty or liability for your use of this information.

## 2017-06-16 RX ORDER — ALPRAZOLAM 0.25 MG/1
TABLET ORAL
Qty: 30 TAB | OUTPATIENT
Start: 2017-06-16

## 2017-06-16 NOTE — TELEPHONE ENCOUNTER
She will need to be seen to have this control med refilled.  This has not been filled by this office since 2014 and is not on her current med list

## 2017-06-16 NOTE — TELEPHONE ENCOUNTER
LM for pt to call back & confirm if she can keep appt for this upcoming Monday that I placed her on.

## 2017-06-28 ENCOUNTER — OFFICE VISIT (OUTPATIENT)
Dept: FAMILY MEDICINE CLINIC | Age: 62
End: 2017-06-28

## 2017-06-28 VITALS
HEIGHT: 66 IN | DIASTOLIC BLOOD PRESSURE: 80 MMHG | WEIGHT: 105 LBS | OXYGEN SATURATION: 97 % | RESPIRATION RATE: 16 BRPM | BODY MASS INDEX: 16.88 KG/M2 | SYSTOLIC BLOOD PRESSURE: 116 MMHG | HEART RATE: 55 BPM | TEMPERATURE: 98 F

## 2017-06-28 DIAGNOSIS — R63.6 UNDERWEIGHT: ICD-10-CM

## 2017-06-28 DIAGNOSIS — F41.8 SITUATIONAL ANXIETY: Primary | ICD-10-CM

## 2017-06-28 DIAGNOSIS — Z82.49 FAMILY HISTORY OF HEART ATTACK: ICD-10-CM

## 2017-06-28 DIAGNOSIS — I10 ESSENTIAL HYPERTENSION WITH GOAL BLOOD PRESSURE LESS THAN 140/90: ICD-10-CM

## 2017-06-28 DIAGNOSIS — Z82.3 FAMILY HISTORY OF STROKE: ICD-10-CM

## 2017-06-28 DIAGNOSIS — E78.5 DYSLIPIDEMIA: ICD-10-CM

## 2017-06-28 DIAGNOSIS — M85.88 OSTEOPENIA OF OTHER SITE: ICD-10-CM

## 2017-06-28 DIAGNOSIS — E16.2 HYPOGLYCEMIA: ICD-10-CM

## 2017-06-28 DIAGNOSIS — Z84.1 FAMILY HISTORY OF KIDNEY DISEASE: ICD-10-CM

## 2017-06-28 DIAGNOSIS — Z80.3 FAMILY HISTORY OF BREAST CANCER: ICD-10-CM

## 2017-06-28 DIAGNOSIS — R25.2 CRAMP OF BOTH LOWER EXTREMITIES: ICD-10-CM

## 2017-06-28 RX ORDER — ALPRAZOLAM 0.25 MG/1
0.25 TABLET ORAL
Qty: 15 TAB | Refills: 1 | Status: SHIPPED | OUTPATIENT
Start: 2017-06-28 | End: 2018-12-11 | Stop reason: ALTCHOICE

## 2017-06-28 NOTE — PATIENT INSTRUCTIONS
Muscle Cramps: Care Instructions  Your Care Instructions  A muscle cramp is when a muscle tightens up suddenly. It often occurs in the legs. A muscle cramp is also called a charley horse. Muscle cramps usually last less than a minute. However, the pain may last for several minutes. Leg cramps that occur at night may wake you up. Heavy exercise, dehydration, and being overweight can increase your risk of getting cramps. An imbalance of certain chemicals in your blood, called electrolytes, can also lead to muscle cramps. Pregnant women sometimes get muscle cramps during sleep. Muscle cramps can be treated by stretching and massaging the muscle. If cramps keep coming back, your doctor may prescribe medicine that relaxes your muscles. Follow-up care is a key part of your treatment and safety. Be sure to make and go to all appointments, and call your doctor if you are having problems. Its also a good idea to know your test results and keep a list of the medicines you take. How can you care for yourself at home? · Drink plenty of fluids to prevent dehydration, enough so that your urine is light yellow or clear like water. Choose water and other caffeine-free clear liquids until you feel better. If you have kidney, heart, or liver disease and have to limit fluids, talk with your doctor before you increase the amount of fluids you drink. · Stretch your muscles every day, especially before and after exercise and at bedtime. Regular stretching can relax your muscles and may prevent cramps. · Do not suddenly increase the amount of exercise you get. Increase your exercise a little each week. · When you get a cramp, stretch and massage the muscle. You can also take a warm shower or bath to relax the muscle. A heating pad placed on the muscle can also help. · Take a daily multivitamin supplement.   · Take an over-the-counter pain medicine, such as acetaminophen (Tylenol), ibuprofen (Advil, Motrin), or naproxen (Aleve) for cramps. Read and follow all instructions on the label. · Take your medicines exactly as prescribed. Call your doctor if you have any problems with your medicine. When should you call for help? Watch closely for changes in your health, and be sure to contact your doctor if:  · You get muscle cramps often that do not go away after home treatment. · Your muscle cramps often wake you up at night. · You do not get better as expected. Where can you learn more? Go to http://marlo-estelita.info/. Enter D484 in the search box to learn more about \"Muscle Cramps: Care Instructions. \"  Current as of: March 21, 2017  Content Version: 11.3  © 8899-5337 [employer]. Care instructions adapted under license by "[employer]" (which disclaims liability or warranty for this information). If you have questions about a medical condition or this instruction, always ask your healthcare professional. [place] [address] any warranty or liability for your use of this information. Oral Rehydration: Care Instructions  Your Care Instructions  Dehydration occurs when your body loses too much water. This can happen if you do not drink enough fluids or lose a lot of fluid due to diarrhea, vomiting, or sweating. Being dehydrated can cause health problems and can even be life-threatening. To replace lost fluids, you need to drink liquid that contains special chemicals called electrolytes. Electrolytes keep your body working well. Plain water does not have electrolytes. You also need to rest to prevent more fluid loss. Replacing water and electrolytes (oral rehydration) completely takes about 36 hours. But you should feel better within a few hours. Follow-up care is a key part of your treatment and safety. Be sure to make and go to all appointments, and call your doctor if you are having problems.  It's also a good idea to know your test results and keep a list of the medicines you take. How can you care for yourself at home? · Take frequent sips of a drink such as Gatorade, Powerade, or other rehydration drinks that your doctor suggests. These replace both fluid and important chemicals (electrolytes) you need for balance in your blood. · Drink 2 quarts of cool liquid over 2 to 4 hours. You should have at least 10 glasses of liquid a day to replace lost fluid. If you have kidney, heart, or liver disease and have to limit fluids, talk with your doctor before you increase the amount of fluids you drink. · Make your own drink. Measure everything carefully. The drink may not work well or may even be harmful if the amounts are off. ¨ 1 quart water  ¨ ½ teaspoon salt  ¨ 6 teaspoons sugar  · Do not drink liquid with caffeine, such as coffee and nerissa. · Do not drink any alcohol. It can make you dehydrated. · Drink plenty of fluids, enough so that your urine is light yellow or clear like water. If you have kidney, heart, or liver disease and have to limit fluids, talk with your doctor before you increase the amount of fluids you drink. When should you call for help? Call 911 anytime you think you may need emergency care. For example, call if:  · You have signs of severe dehydration, such as:  ¨ You are confused or unable to stay awake. ¨ You passed out (lost consciousness). Call your doctor now or seek immediate medical care if:  · You still have signs of dehydration. You have sunken eyes and a dry mouth, and you pass only a little dark urine. · You are dizzy or lightheaded, or you feel like you may faint. · You are not able to keep down fluids. Watch closely for changes in your health, and be sure to contact your doctor if:  · You do not get better as expected. Where can you learn more? Go to http://marlo-estelita.info/. Enter I040 in the search box to learn more about \"Oral Rehydration: Care Instructions. \"  Current as of: March 20, 2017  Content Version: 11.3  © 4732-9405 [employer]. Care instructions adapted under license by [employer] (which disclaims liability or warranty for this information). If you have questions about a medical condition or this instruction, always ask your healthcare professional. [address] any warranty or liability for your use of this information.

## 2017-06-28 NOTE — MR AVS SNAPSHOT
Visit Information Date & Time Provider Department Dept. Phone Encounter #  
 6/28/2017 12:00 PM [facility]  Follow-up Instructions Return in about 3 months (around 9/28/2017) for bp, leg cramps. Upcoming Health Maintenance Date Due INFLUENZA AGE 9 TO ADULT 8/1/2017 BREAST CANCER SCRN MAMMOGRAM 3/28/2019 PAP AKA CERVICAL CYTOLOGY 3/28/2020 COLONOSCOPY 9/19/2026 DTaP/Tdap/Td series (2 - Td) 3/28/2027 Allergies as of 6/28/2017  Review Complete On: 6/28/2017 By: Brandie Carey Severity Noted Reaction Type Reactions Prempro [Conj Estrog-medroxyprogest Ace]  08/08/2011    Other (comments) Benign Right breast mass. Current Immunizations  Reviewed on 3/28/2017 Name Date Influenza Vaccine 10/1/2016, 10/1/2014 Influenza Vaccine Split 10/21/2011, 10/18/2010 TD Vaccine 8/31/2004 Tdap 3/28/2017 Not reviewed this visit You Were Diagnosed With   
  
 Codes Comments Situational anxiety    -  Primary ICD-10-CM: F41.8 ICD-9-CM: 300.09 on boats and cruises. Essential hypertension with goal blood pressure less than 140/90     ICD-10-CM: I10 
ICD-9-CM: 401.9 due to high salt diet vs other Dyslipidemia     ICD-10-CM: E78.5 ICD-9-CM: 272.4 stable Cramp of both lower extremities     ICD-10-CM: R25.2 ICD-9-CM: 729.82 due to dehydration vs other Underweight     ICD-10-CM: R63.6 ICD-9-CM: 783.22 stable, pt is at her baseline range Hypoglycemia     ICD-10-CM: E16.2 ICD-9-CM: 251.2 Osteopenia of other site     ICD-10-CM: M85.88 Family history of stroke     ICD-10-CM: Z82.3 ICD-9-CM: V17.1 Family history of heart attack     ICD-10-CM: Z82.49 
ICD-9-CM: V17.3 Family history of kidney disease     ICD-10-CM: Z84.1 ICD-9-CM: V18.69 Family history of breast cancer     ICD-10-CM: Z80.3 ICD-9-CM: V16.3 Vitals BP Pulse Temp Resp Height(growth percentile) Weight(growth percentile) 141/90 (BP 1 Location: Right arm, BP Patient Position: Sitting) (!) 55 98 °F (36.7 °C) (Oral) 16 5' 5.98\" (1.676 m) 105 lb (47.6 kg) SpO2 BMI OB Status Smoking Status 97% 16.96 kg/m2 Postmenopausal Former Smoker BMI and BSA Data Body Mass Index Body Surface Area  
 16.96 kg/m 2 1.49 m 2 Preferred Pharmacy Pharmacy Name Phone [id number] [address] [phone] Your Updated Medication List  
  
   
This list is accurate as of: 6/28/17 12:59 PM.  Always use your most recent med list.  
  
  
  
  
 ALPRAZolam 0.25 mg tablet Commonly known as:  Viveca More Take 1 Tab by mouth three (3) times daily as needed for Anxiety. Max Daily Amount: 0.75 mg. Indications: situational anxiety  
  
 aspirin delayed-release 81 mg tablet Take 1 Tab by mouth daily. cetirizine 10 mg tablet Commonly known as:  ZYRTEC Take  by mouth.  
  
 ergocalciferol 50,000 unit capsule Commonly known as:  ERGOCALCIFEROL Take 1 Cap by mouth every seven (7) days. FISH OIL 1,000 mg Cap Generic drug:  omega-3 fatty acids-vitamin e Take  by mouth daily. fluticasone 50 mcg/actuation nasal spray Commonly known as:  Krista Salas 2 Sprays by Both Nostrils route daily. lisinopril 20 mg tablet Commonly known as:  PRINIVIL, ZESTRIL  
TAKE 1 TABLET BY MOUTH DAILY  
  
 loratadine 10 mg tablet Commonly known as:  Arleta Pals Take 1 Tab by mouth daily. montelukast 10 mg tablet Commonly known as:  SINGULAIR Take 1 Tab by mouth daily. MULTIVITAMIN PO Take  by mouth. Takes 3 times/week  
  
 pantoprazole 40 mg tablet Commonly known as:  PROTONIX  
TAKE 1 TABLET BY MOUTH EVERY DAY  
  
 POTASSIUM PO Take  by mouth. simvastatin 40 mg tablet Commonly known as:  ZOCOR  
TAKE 1 TABLET BY MOUTH AT BEDTIME  
  
 valACYclovir 500 mg tablet Commonly known as:  VALTREX  
TAKE 1 TABLET BY MOUTH EVERY DAY DURING FLARES  
  
 VITAMIN B-12 1,000 mcg tablet Generic drug:  cyanocobalamin Take 1,000 mcg by mouth daily. VITAMIN C 500 mg tablet Generic drug:  ascorbic acid (vitamin C) Take  by mouth. Prescriptions Printed Refills ALPRAZolam (XANAX) 0.25 mg tablet 1 Sig: Take 1 Tab by mouth three (3) times daily as needed for Anxiety. Max Daily Amount: 0.75 mg. Indications: situational anxiety Class: Print Route: Oral  
  
Follow-up Instructions Return in about 3 months (around 9/28/2017) for bp, leg cramps. Patient Instructions Muscle Cramps: Care Instructions Your Care Instructions A muscle cramp is when a muscle tightens up suddenly. It often occurs in the legs. A muscle cramp is also called a charley horse. Muscle cramps usually last less than a minute. However, the pain may last for several minutes. Leg cramps that occur at night may wake you up. Heavy exercise, dehydration, and being overweight can increase your risk of getting cramps. An imbalance of certain chemicals in your blood, called electrolytes, can also lead to muscle cramps. Pregnant women sometimes get muscle cramps during sleep. Muscle cramps can be treated by stretching and massaging the muscle. If cramps keep coming back, your doctor may prescribe medicine that relaxes your muscles. Follow-up care is a key part of your treatment and safety. Be sure to make and go to all appointments, and call your doctor if you are having problems. Its also a good idea to know your test results and keep a list of the medicines you take. How can you care for yourself at home? · Drink plenty of fluids to prevent dehydration, enough so that your urine is light yellow or clear like water. Choose water and other caffeine-free clear liquids until you feel better.  If you have kidney, heart, or liver disease and have to limit fluids, talk with your doctor before you increase the amount of fluids you drink. · Stretch your muscles every day, especially before and after exercise and at bedtime. Regular stretching can relax your muscles and may prevent cramps. · Do not suddenly increase the amount of exercise you get. Increase your exercise a little each week. · When you get a cramp, stretch and massage the muscle. You can also take a warm shower or bath to relax the muscle. A heating pad placed on the muscle can also help. · Take a daily multivitamin supplement. · Take an over-the-counter pain medicine, such as acetaminophen (Tylenol), ibuprofen (Advil, Motrin), or naproxen (Aleve) for cramps. Read and follow all instructions on the label. · Take your medicines exactly as prescribed. Call your doctor if you have any problems with your medicine. When should you call for help? Watch closely for changes in your health, and be sure to contact your doctor if: 
· You get muscle cramps often that do not go away after home treatment. · Your muscle cramps often wake you up at night. · You do not get better as expected. Where can you learn more? Go to http://marlo-estelita.info/. Enter K945 in the search box to learn more about \"Muscle Cramps: Care Instructions. \" Current as of: March 21, 2017 Content Version: 11.3 © 3025-8421 [employer]. Care instructions adapted under license by [employer] (which disclaims liability or warranty for this information). If you have questions about a medical condition or this instruction, always ask your healthcare professional. [place] [address] any warranty or liability for your use of this information. Oral Rehydration: Care Instructions Your Care Instructions Dehydration occurs when your body loses too much water.  This can happen if you do not drink enough fluids or lose a lot of fluid due to diarrhea, vomiting, or sweating. Being dehydrated can cause health problems and can even be life-threatening. To replace lost fluids, you need to drink liquid that contains special chemicals called electrolytes. Electrolytes keep your body working well. Plain water does not have electrolytes. You also need to rest to prevent more fluid loss. Replacing water and electrolytes (oral rehydration) completely takes about 36 hours. But you should feel better within a few hours. Follow-up care is a key part of your treatment and safety. Be sure to make and go to all appointments, and call your doctor if you are having problems. It's also a good idea to know your test results and keep a list of the medicines you take. How can you care for yourself at home? · Take frequent sips of a drink such as Gatorade, Powerade, or other rehydration drinks that your doctor suggests. These replace both fluid and important chemicals (electrolytes) you need for balance in your blood. · Drink 2 quarts of cool liquid over 2 to 4 hours. You should have at least 10 glasses of liquid a day to replace lost fluid. If you have kidney, heart, or liver disease and have to limit fluids, talk with your doctor before you increase the amount of fluids you drink. · Make your own drink. Measure everything carefully. The drink may not work well or may even be harmful if the amounts are off. ¨ 1 quart water ¨ ½ teaspoon salt ¨ 6 teaspoons sugar · Do not drink liquid with caffeine, such as coffee and nerissa. · Do not drink any alcohol. It can make you dehydrated. · Drink plenty of fluids, enough so that your urine is light yellow or clear like water. If you have kidney, heart, or liver disease and have to limit fluids, talk with your doctor before you increase the amount of fluids you drink. When should you call for help? Call 911 anytime you think you may need emergency care. For example, call if: 
· You have signs of severe dehydration, such as: ¨ You are confused or unable to stay awake. ¨ You passed out (lost consciousness). Call your doctor now or seek immediate medical care if: 
· You still have signs of dehydration. You have sunken eyes and a dry mouth, and you pass only a little dark urine. · You are dizzy or lightheaded, or you feel like you may faint. · You are not able to keep down fluids. Watch closely for changes in your health, and be sure to contact your doctor if: 
· You do not get better as expected. Where can you learn more? Go to http://marlo-estelita.info/. Enter I040 in the search box to learn more about \"Oral Rehydration: Care Instructions. \" Current as of: March 20, 2017 Content Version: 11.3 © 9051-2892 [employer]. Care instructions adapted under license by [employer] (which disclaims liability or warranty for this information). If you have questions about a medical condition or this instruction, always ask your healthcare professional. [place] [address] any warranty or liability for your use of this information. Introducing [facility]! Dear [employer]: Thank you for requesting a [employer] account. Our records indicate that you already have an active [employer] account. You can access your account anytime at [email or web address] Did you know that you can access your hospital and ER discharge instructions at any time in [employer]? You can also review all of your test results from your hospital stay or ER visit. Additional Information If you have questions, please visit the Frequently Asked Questions section of the [employer] website at [email or web address] Remember, [employer] is NOT to be used for urgent needs. For medical emergencies, dial 911. Now available from your iPhone and Android! Please provide this summary of care documentation to your next provider. Your primary care clinician is listed as Cesilia Meneses. If you have any questions after today's visit, please call [phone].

## 2017-06-28 NOTE — PROGRESS NOTES
1. Have you been to the ER, urgent care clinic since your last visit? Hospitalized since your last visit? No    2. Have you seen or consulted any other health care providers outside of the [address] [place] [address] since your last visit? Include any pap smears or colon screening. No    In the event something were to happen to you and you were unable to speak on your behalf, do you have an Advance Directive/ Living Will in place stating your wishes? NO    If yes, do we have a copy on file NO    If no, would you like information:   Pt offered and declined.

## 2017-06-28 NOTE — PROGRESS NOTES
HISTORY OF PRESENT ILLNESS  Faizan Black is a 64 y.o. female presents with Medication Refill (Xanax; was previously taking before. ); Leg Pain (getting leg cramps more often; trying to drink more water, states it is helping.); and Blood Pressure Check    Agree with nurse note. Hypertensive pt with dyslipidemia, hx of hypoglycemia, and family hx of stroke, heart attack, and kidney disease presents to the office with a BP of 141/90. She had french fries with vinegar last night and a boiled egg with salt this morning. She had hot sauce on fried chicken a few days ago and [employer] fod with soy sauce. Patient denies vision changes, headaches, dizziness, chest pain, SOB, or swelling. She weighs 105 lbs, lost 1 lb since last ov. \"I graduated from high school weighing 75 lbs. My dad was this way and so was my mom. \" She eats less \"heavy foods\" during the summer due to the warmer weather. She has been having more leg cramps and believes she is not drink enough water. She also notes decreased BMs. She has been taking some Potassium with relief. She has a hx of situation anxiety x years, onset when riding on boats or going on cruises. She has a cruise scheduled for 08/2017 x10 days. She requests a refill of Xanax 0.25 to take prn, last rx'd in 2014. Health Maintenance    Pt's most recent colonoscopy was on 09/19/16 with GI, Dr. Didier Wyman. F/U 10 years. Pt with osteopenia and family hx of breast cancer. She has paps and DEXA scans with Dr. Awilda Wilder and mammograms at [facility]. She is up to date with these but our office does not have copies of the notes. Her next eye exam is in 09/2017 with optometrist, Dr. Indra Beavers. Written by shawna Snow, as dictated by Dr. Oren Camacho DO.    ROS    Review of Systems negative except as noted above in HPI.     ALLERGIES:    Allergies   Allergen Reactions    Prempro [Conj Estrog-Medroxyprogest Ace] Other (comments)     Benign Right breast mass. CURRENT MEDICATIONS:    Outpatient Prescriptions Marked as Taking for the 6/28/17 encounter (Office Visit) with Bertha Loomis, DO   Medication Sig Dispense Refill    POTASSIUM PO Take  by mouth.  ALPRAZolam (XANAX) 0.25 mg tablet Take 1 Tab by mouth three (3) times daily as needed for Anxiety. Max Daily Amount: 0.75 mg. Indications: situational anxiety 15 Tab 1    aspirin delayed-release 81 mg tablet Take 1 Tab by mouth daily. 90 Tab 3    ergocalciferol (ERGOCALCIFEROL) 50,000 unit capsule Take 1 Cap by mouth every seven (7) days. 4 Cap 2    loratadine (CLARITIN) 10 mg tablet Take 1 Tab by mouth daily. 15 Tab 0    fluticasone (FLONASE) 50 mcg/actuation nasal spray 2 Sprays by Both Nostrils route daily. 1 Bottle 0    pantoprazole (PROTONIX) 40 mg tablet TAKE 1 TABLET BY MOUTH EVERY DAY 90 Tab 1    cetirizine (ZYRTEC) 10 mg tablet Take  by mouth.  montelukast (SINGULAIR) 10 mg tablet Take 1 Tab by mouth daily. 30 Tab 5    valACYclovir (VALTREX) 500 mg tablet TAKE 1 TABLET BY MOUTH EVERY DAY DURING FLARES 90 Tab 0    lisinopril (PRINIVIL, ZESTRIL) 20 mg tablet TAKE 1 TABLET BY MOUTH DAILY 30 Tab 2    simvastatin (ZOCOR) 40 mg tablet TAKE 1 TABLET BY MOUTH AT BEDTIME 90 tablet 3    cyanocobalamin (VITAMIN B-12) 1,000 mcg tablet Take 1,000 mcg by mouth daily.  ascorbic acid (VITAMIN C) 500 mg tablet Take  by mouth. PAST MEDICAL HISTORY:    Past Medical History:   Diagnosis Date    Allergic rhinitis, cause unspecified     Arthritis     fingers and toes    Breast mass 06/1999    Right. Benign. due to Prempro. Dr. Anali Herrera    Chickenpox childhood    Dizziness 10/2011    Dr. Jerzy Noonan.  EBV positive mononucleosis syndrome 10/2010    positive titer.     Exposure to hepatitis B         Genital HSV     HA (headache)     Heart palpitations 05/21/09    Dr. Hernan Parker    Hypoglycemia     Menopausal and postmenopausal disorder 1999    MRSA infection 12/2011    Right hip. Txd Bactrim.  Osteopenia     Dr. Coleman Hidden. Dr. Sandra Hall.  Other and unspecified hyperlipidemia     Raynaud phenomenon     RLS (restless legs syndrome) 2008    Shingles teenager    R arm    Vitamin D deficiency 2008       PAST SURGICAL HISTORY:    Past Surgical History:   Procedure Laterality Date    COLONOSCOPY  09/19/2016    Dr. Miroslava Aquino. due q 10 yrs.  HX BREAST LUMPECTOMY  1999    Benign. Dr. Lazara Sykes HX ORTHOPAEDIC Right 10/2012    Right arm. BENIGN TUMOR REMOVED. Dr. Sena Lopes:    Family History   Problem Relation Age of Onset    Other Mother      gallstones    Breast Cancer Mother 52     unilateral.   [place] Mother 62     Breast Cancer    Hypertension Father     Stroke Father 70     x2 CVAs and several TIAs    Kidney Disease Father     Asthma Paternal Uncle      x 2    Stroke Paternal Aunt     Diabetes Paternal Aunt     Heart Attack Paternal Aunt     Other Paternal Aunt      gout    Diabetes Paternal Aunt     Cancer Maternal Grandmother      uterine    Colon Cancer Maternal Grandmother 79    Cancer Maternal Aunt      lung    Cancer Other      maternal cousins     Breast Cancer Other 28     x 2.  bilaterally.     Alzheimer Maternal Aunt      x 2       SOCIAL HISTORY:    Social History     Social History    Marital status:      Spouse name: N/A    Number of children: N/A    Years of education: N/A     Social History Main Topics    Smoking status: Former Smoker     Packs/day: 0.50     Years: 10.00     Types: Cigarettes     Quit date: 1/1/1985    Smokeless tobacco: Never Used    Alcohol use 1.5 oz/week     3 Standard drinks or equivalent per week      Comment: Occasional    Drug use: No    Sexual activity: Yes     Partners: Male     Birth control/ protection: None     Other Topics Concern    None     Social History Narrative       IMMUNIZATIONS:    Immunization History   Administered Date(s) Administered    Influenza Vaccine 10/01/2014, 10/01/2016    Influenza Vaccine Split 10/18/2010, 10/21/2011    TD Vaccine 08/31/2004    Tdap 03/28/2017         PHYSICAL EXAMINATION    Vital Signs    Visit Vitals    /80 (BP 1 Location: Left arm, BP Patient Position: Sitting)  Comment: taken manually    Pulse (!) 55    Temp 98 °F (36.7 °C) (Oral)    Resp 16    Ht 5' 5.98\" (1.676 m)    Wt 105 lb (47.6 kg)    SpO2 97%    BMI 16.96 kg/m2       Weight Metrics 6/28/2017 3/28/2017 2/18/2017 9/26/2016 10/20/2015 4/21/2015 3/16/2015   Weight 105 lb 106 lb 11.2 oz 107 lb 110 lb 6.4 oz 110 lb 14.4 oz 112 lb 8 oz 112 lb 6.4 oz   BMI 16.96 kg/m2 17.22 kg/m2 17.27 kg/m2 17.82 kg/m2 17.91 kg/m2 18.17 kg/m2 18.15 kg/m2       General appearance - Well nourished. Well appearing. Well developed. No acute distress. Underweight. Head - Normocephalic. Atraumatic. Eyes - pupils equal and reactive. Extraocular eye movements intact. Sclera anicteric. Mildly injected sclera. Ears - Hearing is grossly normal bilaterally. Nose - normal and patent. No polyps noted. No erythema. No discharge. Mouth - mucous membranes with adequate moisture. Posterior pharynx normal with cobblestone appearance. No erythema, white exudate or obstruction. Neck - supple. Midline trachea. No carotid bruits noted bilaterally. No thyromegaly noted. Chest - clear to auscultation bilaterally anteriorly and posteriorly. No rales or rhonchi. Breath sounds are symmetrical bilaterally. Unlabored respirations. Low pitch, mild inspiratory wheeze on R upper and lower lobes posteriorly after forced cough. Heart - normal rate. Regular rhythm. Normal S1, S2. No murmur noted. No rubs, clicks or gallops noted. Abdomen - soft and nondistended. No masses or organomegaly. No rebound, rigidity or guarding. Bowel sounds normal x 4 quadrants. No tenderness noted.   Neurological - awake, alert and oriented to person, place, and time and event. Cranial nerves II through XII intact. Clear speech. Muscle strength is +5/5 x 4 extremities. Sensation is intact to light touch bilaterally. Steady gait. Heme/Lymph - peripheral pulses normal x 4 extremities. No peripheral edema is noted. Musculoskeletal - Intact x 4 extremities. Full ROM x 4 extremities. No pain with movement. Back exam - normal range of motion. No pain on palpation of the spinous processes in the cervical, thoracic, lumbar, sacral regions. No CVA tenderness. Skin - no rashes, erythema, ecchymosis, lacerations, abrasions, suspicious moles noted  Psychological -   normal behavior, dress and thought processes. Good insight. Good eye contact. Normal affect. Appropriate mood. Normal speech. DATA REVIEWED    Results for orders placed or performed in visit on 09/26/16   LIPID PANEL   Result Value Ref Range    Cholesterol, total 177 100 - 199 mg/dL    Triglyceride 58 0 - 149 mg/dL    HDL Cholesterol 71 >39 mg/dL    VLDL, calculated 12 5 - 40 mg/dL    LDL, calculated 94 0 - 99 mg/dL   METABOLIC PANEL, COMPREHENSIVE   Result Value Ref Range    Glucose 77 65 - 99 mg/dL    BUN 11 8 - 27 mg/dL    Creatinine 0.86 0.57 - 1.00 mg/dL    GFR est non-AA 73 >59 mL/min/1.73    GFR est AA 84 >59 mL/min/1.73    BUN/Creatinine ratio 13 11 - 26    Sodium 141 134 - 144 mmol/L    Potassium 4.6 3.5 - 5.2 mmol/L    Chloride 100 96 - 106 mmol/L    CO2 24 18 - 29 mmol/L    Calcium 9.4 8.7 - 10.3 mg/dL    Protein, total 7.1 6.0 - 8.5 g/dL    Albumin 4.8 3.6 - 4.8 g/dL    GLOBULIN, TOTAL 2.3 1.5 - 4.5 g/dL    A-G Ratio 2.1 1.2 - 2.2    Bilirubin, total 0.7 0.0 - 1.2 mg/dL    Alk.  phosphatase 71 39 - 117 IU/L    AST (SGOT) 14 0 - 40 IU/L    ALT (SGPT) 10 0 - 32 IU/L   TSH 3RD GENERATION   Result Value Ref Range    TSH 1.480 0.450 - 4.500 uIU/mL   URINALYSIS W/ RFLX MICROSCOPIC   Result Value Ref Range    Specific Gravity 1.009 1.005 - 1.030    pH (UA) 6.0 5.0 - 7.5    Color Yellow Yellow Appearance Clear Clear    Leukocyte Esterase Trace (A) Negative    Protein Negative Negative/Trace    Glucose Negative Negative    Ketone Negative Negative    Blood Negative Negative    Bilirubin Negative Negative    Urobilinogen 0.2 0.2 - 1.0 mg/dL    Nitrites Negative Negative    Microscopic Examination See additional order    MICROALBUMIN, UR, RAND W/ MICROALBUMIN/CREA RATIO   Result Value Ref Range    Creatinine, urine 55.4 Not Estab. mg/dL    Microalbumin, urine <3.0 Not Estab. ug/mL    Microalb/Creat ratio (ug/mg creat.) <5.4 0.0 - 30.0 mg/g creat   VITAMIN D, 25 HYDROXY   Result Value Ref Range    VITAMIN D, 25-HYDROXY 26.2 (L) 30.0 - 100.0 ng/mL   HEMOGLOBIN A1C WITH EAG   Result Value Ref Range    Hemoglobin A1c 5.1 4.8 - 5.6 %    Estimated average glucose 100 mg/dL   HEPATITIS C AB   Result Value Ref Range    Hep C Virus Ab <0.1 0.0 - 0.9 s/co ratio   CVD REPORT   Result Value Ref Range    INTERPRETATION Note    MICROSCOPIC EXAMINATION   Result Value Ref Range    WBC 0-5 0 - 5 /hpf    RBC 0-2 0 - 2 /hpf    Epithelial cells 0-10 0 - 10 /hpf    Casts None seen None seen /lpf    Mucus Present Not Estab. Bacteria Few None seen/Few       ASSESSMENT and PLAN      ICD-10-CM ICD-9-CM    1. Situational anxiety F41.8 300.09 ALPRAZolam (XANAX) 0.25 mg tablet    on boats and cruises. 2. Essential hypertension with goal blood pressure less than 140/90 I10 401.9     due to high salt diet vs other   3. Dyslipidemia E78.5 272.4     stable   4. Cramp of both lower extremities R25.2 729.82     due to dehydration vs other   5. Underweight R63.6 783.22     stable, pt is at her baseline range   6. Hypoglycemia E16.2 251.2    7. Osteopenia of other site M85.88     8. Family history of stroke Z82.3 V17.1    9. Family history of heart attack Z82.49 V17.3    10. Family history of kidney disease Z84.1 V18.69    11. Family history of breast cancer Z80.3 V16.3        Discussed the patient's BMI with her.   The BMI follow up plan is as follows: I have counseled this patient on diet and exercise regimens. Decrease carbohydrates (white foods, sweet foods, sweet drinks and alcohol), increase green leafy vegetables and protein (lean meats and beans) with each meal.  Avoid fried foods. Eat 3-5 small meals daily. Do not skip meals. Increase water intake. Increase physical activity to 30 minutes daily for health benefit or 60 minutes daily to prevent weight regain, as tolerated. Get 7-8 hours uninterrupted sleep nightly. Chart reviewed and updated. Controlled Substance Agreement reviewed and signed. Advise pt to bring pill bottle(s) for pill counts and the need for face-to-face visits when refills are requested. Prescription given to patient during office visit today. Xanax 0.25 mg. Cautioned pt about addictive potential.     Continue current medications and care. Recommend OTC Magnesium prn leg cramps. Most recent tests reviewed. Get recent office visit notes from Dr. Jeffery Juan (pap and DEXA) and [facility] (mammogram). Advised pt to sign release. Counseled patient on health concerns:  Leg cramps, BP, and situation anxiety. Relevant handouts given and discussed with patient. Immunizations noted. Offered empathy, support, legitimation, prayers, partnership to patient. Praised patient for progress. Follow-up Disposition:  Return in about 3 months (around 9/28/2017) for bp, leg cramps. Patient was offered a choice/choices in the treatment plan today. Patient expresses understanding of the plan and agrees with recommendations. Written by shawna Fabian, as dictated by Dr. Jose Beard DO. Documentation True and Accepted by Arley Bro. Juliocesar Wilburn. Patient Instructions        Muscle Cramps: Care Instructions  Your Care Instructions  A muscle cramp is when a muscle tightens up suddenly. It often occurs in the legs. A muscle cramp is also called a charley horse.   Muscle cramps usually last less than a minute. However, the pain may last for several minutes. Leg cramps that occur at night may wake you up. Heavy exercise, dehydration, and being overweight can increase your risk of getting cramps. An imbalance of certain chemicals in your blood, called electrolytes, can also lead to muscle cramps. Pregnant women sometimes get muscle cramps during sleep. Muscle cramps can be treated by stretching and massaging the muscle. If cramps keep coming back, your doctor may prescribe medicine that relaxes your muscles. Follow-up care is a key part of your treatment and safety. Be sure to make and go to all appointments, and call your doctor if you are having problems. Its also a good idea to know your test results and keep a list of the medicines you take. How can you care for yourself at home? · Drink plenty of fluids to prevent dehydration, enough so that your urine is light yellow or clear like water. Choose water and other caffeine-free clear liquids until you feel better. If you have kidney, heart, or liver disease and have to limit fluids, talk with your doctor before you increase the amount of fluids you drink. · Stretch your muscles every day, especially before and after exercise and at bedtime. Regular stretching can relax your muscles and may prevent cramps. · Do not suddenly increase the amount of exercise you get. Increase your exercise a little each week. · When you get a cramp, stretch and massage the muscle. You can also take a warm shower or bath to relax the muscle. A heating pad placed on the muscle can also help. · Take a daily multivitamin supplement. · Take an over-the-counter pain medicine, such as acetaminophen (Tylenol), ibuprofen (Advil, Motrin), or naproxen (Aleve) for cramps. Read and follow all instructions on the label. · Take your medicines exactly as prescribed. Call your doctor if you have any problems with your medicine. When should you call for help?   Watch closely for changes in your health, and be sure to contact your doctor if:  · You get muscle cramps often that do not go away after home treatment. · Your muscle cramps often wake you up at night. · You do not get better as expected. Where can you learn more? Go to http://marlo-estelita.info/. Enter C001 in the search box to learn more about \"Muscle Cramps: Care Instructions. \"  Current as of: March 21, 2017  Content Version: 11.3  © 3043-6699 [employer]. Care instructions adapted under license by [employer] (which disclaims liability or warranty for this information). If you have questions about a medical condition or this instruction, always ask your healthcare professional. [address] any warranty or liability for your use of this information. Oral Rehydration: Care Instructions  Your Care Instructions  Dehydration occurs when your body loses too much water. This can happen if you do not drink enough fluids or lose a lot of fluid due to diarrhea, vomiting, or sweating. Being dehydrated can cause health problems and can even be life-threatening. To replace lost fluids, you need to drink liquid that contains special chemicals called electrolytes. Electrolytes keep your body working well. Plain water does not have electrolytes. You also need to rest to prevent more fluid loss. Replacing water and electrolytes (oral rehydration) completely takes about 36 hours. But you should feel better within a few hours. Follow-up care is a key part of your treatment and safety. Be sure to make and go to all appointments, and call your doctor if you are having problems. It's also a good idea to know your test results and keep a list of the medicines you take. How can you care for yourself at home? · Take frequent sips of a drink such as Gatorade, Powerade, or other rehydration drinks that your doctor suggests.  These replace both fluid and important chemicals (electrolytes) you need for balance in your blood. · Drink 2 quarts of cool liquid over 2 to 4 hours. You should have at least 10 glasses of liquid a day to replace lost fluid. If you have kidney, heart, or liver disease and have to limit fluids, talk with your doctor before you increase the amount of fluids you drink. · Make your own drink. Measure everything carefully. The drink may not work well or may even be harmful if the amounts are off. ¨ 1 quart water  ¨ ½ teaspoon salt  ¨ 6 teaspoons sugar  · Do not drink liquid with caffeine, such as coffee and nerissa. · Do not drink any alcohol. It can make you dehydrated. · Drink plenty of fluids, enough so that your urine is light yellow or clear like water. If you have kidney, heart, or liver disease and have to limit fluids, talk with your doctor before you increase the amount of fluids you drink. When should you call for help? Call 911 anytime you think you may need emergency care. For example, call if:  · You have signs of severe dehydration, such as:  ¨ You are confused or unable to stay awake. ¨ You passed out (lost consciousness). Call your doctor now or seek immediate medical care if:  · You still have signs of dehydration. You have sunken eyes and a dry mouth, and you pass only a little dark urine. · You are dizzy or lightheaded, or you feel like you may faint. · You are not able to keep down fluids. Watch closely for changes in your health, and be sure to contact your doctor if:  · You do not get better as expected. Where can you learn more? Go to http://marlo-estelita.info/. Enter I040 in the search box to learn more about \"Oral Rehydration: Care Instructions. \"  Current as of: March 20, 2017  Content Version: 11.3  © 5146-3935 [employer]. Care instructions adapted under license by [employer] (which disclaims liability or warranty for this information).  If you have questions about a medical condition or this instruction, always ask your healthcare professional. [place] [address] any warranty or liability for your use of this information.

## 2017-10-16 ENCOUNTER — OFFICE VISIT (OUTPATIENT)
Dept: FAMILY MEDICINE CLINIC | Age: 62
End: 2017-10-16

## 2017-10-16 VITALS
OXYGEN SATURATION: 98 % | SYSTOLIC BLOOD PRESSURE: 146 MMHG | DIASTOLIC BLOOD PRESSURE: 82 MMHG | WEIGHT: 103.4 LBS | HEART RATE: 70 BPM | HEIGHT: 66 IN | TEMPERATURE: 98.5 F | BODY MASS INDEX: 16.62 KG/M2 | RESPIRATION RATE: 16 BRPM

## 2017-10-16 DIAGNOSIS — E16.2 HYPOGLYCEMIA: ICD-10-CM

## 2017-10-16 DIAGNOSIS — R42 DIZZINESS: ICD-10-CM

## 2017-10-16 DIAGNOSIS — R12 HEARTBURN: ICD-10-CM

## 2017-10-16 DIAGNOSIS — I10 ESSENTIAL HYPERTENSION WITH GOAL BLOOD PRESSURE LESS THAN 140/90: ICD-10-CM

## 2017-10-16 DIAGNOSIS — R53.82 CHRONIC FATIGUE: ICD-10-CM

## 2017-10-16 DIAGNOSIS — Z12.39 BREAST CANCER SCREENING: ICD-10-CM

## 2017-10-16 DIAGNOSIS — R00.2 HEART PALPITATIONS: ICD-10-CM

## 2017-10-16 DIAGNOSIS — R63.4 WEIGHT LOSS: ICD-10-CM

## 2017-10-16 DIAGNOSIS — E78.5 DYSLIPIDEMIA: ICD-10-CM

## 2017-10-16 DIAGNOSIS — H10.13 ALLERGIC CONJUNCTIVITIS OF BOTH EYES AND RHINITIS: Primary | ICD-10-CM

## 2017-10-16 DIAGNOSIS — J30.9 ALLERGIC CONJUNCTIVITIS OF BOTH EYES AND RHINITIS: Primary | ICD-10-CM

## 2017-10-16 RX ORDER — PANTOPRAZOLE SODIUM 40 MG/1
40 TABLET, DELAYED RELEASE ORAL DAILY
Qty: 90 TAB | Refills: 1 | Status: SHIPPED | OUTPATIENT
Start: 2017-10-16 | End: 2018-01-16 | Stop reason: SDUPTHER

## 2017-10-16 NOTE — PATIENT INSTRUCTIONS
Indigestion (Dyspepsia or Heartburn): Care Instructions  Your Care Instructions  Sometimes it can be hard to pinpoint the cause of indigestion (dyspepsia or heartburn). Most cases of an upset stomach with bloating, burning, burping, and nausea are minor and go away within several hours. Home treatment and over-the-counter medicine often are able to control symptoms. But if you take medicine to relieve your indigestion without making diet and lifestyle changes, your symptoms are likely to return again and again. If you get indigestion often, it may be a sign of a more serious medical problem. Be sure to follow up with your doctor, who may want to do tests to be sure of the cause of your indigestion. Follow-up care is a key part of your treatment and safety. Be sure to make and go to all appointments, and call your doctor if you are having problems. It's also a good idea to know your test results and keep a list of the medicines you take. How can you care for yourself at home? · Your doctor may recommend over-the-counter medicine. For mild or occasional indigestion, antacids such as Tums, Gaviscon, Mylanta, or Maalox may help. Be careful when you take over-the-counter antacid medicines. Many of these medicines have aspirin in them. Read the label to make sure that you are not taking more than the recommended dose. Too much aspirin can be harmful. · Your doctor also may recommend over-the-counter acid reducers, such as Pepcid AC, Tagamet HB, Zantac 75, or Prilosec. Read and follow all instructions on the label. If you use these medicines often, talk with your doctor. · Change your eating habits. ¨ It's best to eat several small meals instead of two or three large meals. ¨ After you eat, wait 2 to 3 hours before you lie down. ¨ Chocolate, mint, and alcohol can make GERD worse. ¨ Spicy foods, foods that have a lot of acid (like tomatoes and oranges), and coffee can make GERD symptoms worse in some people. If your symptoms are worse after you eat a certain food, you may want to stop eating that food to see if your symptoms get better. · Do not smoke or chew tobacco. Smoking can make GERD worse. If you need help quitting, talk to your doctor about stop-smoking programs and medicines. These can increase your chances of quitting for good. · If you have GERD symptoms at night, raise the head of your bed 6 to 8 inches by putting the frame on blocks or placing a foam wedge under the head of your mattress. (Adding extra pillows does not work.)  · Do not wear tight clothing around your middle. · Lose weight if you need to. Losing just 5 to 10 pounds can help. · Do not take anti-inflammatory medicines, such as aspirin, ibuprofen (Advil, Motrin), or naproxen (Aleve). These can irritate the stomach. If you need a pain medicine, try acetaminophen (Tylenol), which does not cause stomach upset. When should you call for help? Call 911 anytime you think you may need emergency care. For example, call if:  · You passed out (lost consciousness). · You vomit blood or what looks like coffee grounds. · You pass maroon or very bloody stools. · You have chest pain or pressure. This may occur with:  ¨ Sweating. ¨ Shortness of breath. ¨ Nausea or vomiting. ¨ Pain that spreads from the chest to the neck, jaw, or one or both shoulders or arms. ¨ Feeling dizzy or lightheaded. ¨ A fast or uneven pulse. After calling 911, chew 1 adult-strength aspirin. Wait for an ambulance. Do not try to drive yourself. Call your doctor now or seek immediate medical care if:  · You have severe belly pain. · Your stools are black and tarlike or have streaks of blood. · You have trouble swallowing. · You are losing weight and do not know why. Watch closely for changes in your health, and be sure to contact your doctor if:  · You do not get better as expected. Where can you learn more? Go to http://lou.info/.   Enter B018 in the search box to learn more about \"Indigestion (Dyspepsia or Heartburn): Care Instructions. \"  Current as of: November 16, 2016  Content Version: 11.3  © 3195-5665 [employer]. Care instructions adapted under license by [employer] (which disclaims liability or warranty for this information). If you have questions about a medical condition or this instruction, always ask your healthcare professional. [place] [address] any warranty or liability for your use of this information. HEARTBURN occurs when stomach acid moves back up through your esophagus. Several conditions and foods can contribute to this process. Common causes include:    Hiatal Hernia  Pregnancy  Alcohol use  Overweight or Obesity  Smoking. Consuming certain types of foods or drinks can increase the acid content of the stomach and cause heartburn. AVOID THESE TRIGGERS:     Stress  Alcoholic or carbonated beverages  Caffeine (coffee, tea, chocolate, soda)  Acidic foods or drinks (Oranges or orange juice, apples, apple juice, grapefruit or grapefruit juice, bananas, grape juice)  Tomatoes or tomato based foods (pizza, spaghetti, chili)  Greasy, Fatty or Fried foods  Spicy Foods (including hot sauce)  Garlic  Onions  Mint flavoring (peppermint, speramint, cinnamon). PREVENTIVE MEASURES    Do not wear tight, restrictive clothing. Lose weight. Elevate the head of the bed 4 to 6 inches with blocks or a wedge pillow. Wait 2 to 3 hours after a meal before lying down. Avoid the triggers. MEDICATIONS     Try over the counter medications if needed first.  These include:  TUMS, ROLAIDS, Mylanta, Prilosec 20 mg, Pepcid 20 mg, Tagamet, Zantac up to 150 mg twice daily or 300 mg daily, Nexium 20 mg up to 40 mg daily and Prevacid up to 30 mg daily. Ideally, take for 2 weeks after symptoms consistently appear.       NOTIFY OUR OFFICE    If symptoms worsen or persist.   If breathing or swallowing becomes difficult. If you regurgitate blood. DIAL 911 IF THE HEART BURN SYMPTOMS ARE ACCOMPANIED BY   Shortness of breath, sweating, pain in the jaw, neck or arm, cold clammy feeling with nausea or vomiting. This could be a HEART ATTACK. Indigestion (Dyspepsia or Heartburn): Care Instructions  Your Care Instructions  Sometimes it can be hard to pinpoint the cause of indigestion (dyspepsia or heartburn). Most cases of an upset stomach with bloating, burning, burping, and nausea are minor and go away within several hours. Home treatment and over-the-counter medicine often are able to control symptoms. But if you take medicine to relieve your indigestion without making diet and lifestyle changes, your symptoms are likely to return again and again. If you get indigestion often, it may be a sign of a more serious medical problem. Be sure to follow up with your doctor, who may want to do tests to be sure of the cause of your indigestion. Follow-up care is a key part of your treatment and safety. Be sure to make and go to all appointments, and call your doctor if you are having problems. It's also a good idea to know your test results and keep a list of the medicines you take. How can you care for yourself at home? · Your doctor may recommend over-the-counter medicine. For mild or occasional indigestion, antacids such as Tums, Gaviscon, Mylanta, or Maalox may help. Be careful when you take over-the-counter antacid medicines. Many of these medicines have aspirin in them. Read the label to make sure that you are not taking more than the recommended dose. Too much aspirin can be harmful. · Your doctor also may recommend over-the-counter acid reducers, such as Pepcid AC, Tagamet HB, Zantac 75, or Prilosec. Read and follow all instructions on the label. If you use these medicines often, talk with your doctor. · Change your eating habits.   ¨ It's best to eat several small meals instead of two or three large meals.  ¨ After you eat, wait 2 to 3 hours before you lie down. ¨ Chocolate, mint, and alcohol can make GERD worse. ¨ Spicy foods, foods that have a lot of acid (like tomatoes and oranges), and coffee can make GERD symptoms worse in some people. If your symptoms are worse after you eat a certain food, you may want to stop eating that food to see if your symptoms get better. · Do not smoke or chew tobacco. Smoking can make GERD worse. If you need help quitting, talk to your doctor about stop-smoking programs and medicines. These can increase your chances of quitting for good. · If you have GERD symptoms at night, raise the head of your bed 6 to 8 inches by putting the frame on blocks or placing a foam wedge under the head of your mattress. (Adding extra pillows does not work.)  · Do not wear tight clothing around your middle. · Lose weight if you need to. Losing just 5 to 10 pounds can help. · Do not take anti-inflammatory medicines, such as aspirin, ibuprofen (Advil, Motrin), or naproxen (Aleve). These can irritate the stomach. If you need a pain medicine, try acetaminophen (Tylenol), which does not cause stomach upset. When should you call for help? Call 911 anytime you think you may need emergency care. For example, call if:  · You passed out (lost consciousness). · You vomit blood or what looks like coffee grounds. · You pass maroon or very bloody stools. · You have chest pain or pressure. This may occur with:  ¨ Sweating. ¨ Shortness of breath. ¨ Nausea or vomiting. ¨ Pain that spreads from the chest to the neck, jaw, or one or both shoulders or arms. ¨ Feeling dizzy or lightheaded. ¨ A fast or uneven pulse. After calling 911, chew 1 adult-strength aspirin. Wait for an ambulance. Do not try to drive yourself. Call your doctor now or seek immediate medical care if:  · You have severe belly pain. · Your stools are black and tarlike or have streaks of blood. · You have trouble swallowing.   · You are losing weight and do not know why. Watch closely for changes in your health, and be sure to contact your doctor if:  · You do not get better as expected. Where can you learn more? Go to http://marlo-estelita.info/. Enter Z488 in the search box to learn more about \"Indigestion (Dyspepsia or Heartburn): Care Instructions. \"  Current as of: November 16, 2016  Content Version: 11.3  © 4398-7536 [employer]. Care instructions adapted under license by "[employer] (which disclaims liability or warranty for this information). If you have questions about a medical condition or this instruction, always ask your healthcare professional. [address] any warranty or liability for your use of this information. Vertigo: Exercises  Your Care Instructions  Here are some examples of typical rehabilitation exercises for your condition. Start each exercise slowly. Ease off the exercise if you start to have pain. Your doctor or physical therapist will tell you when you can start these exercises and which ones will work best for you. How to do the exercises  Note: Do these exercises twice a day. Try to progress to doing each head movement 15 to 20 times. Then try to do them with your eyes closed. Exercise 1    1. Stand with a chair in front of you and a wall behind you. If you begin to fall, you may use them for support. 2. Stand with your feet together and your arms at your sides. 3. Move your head up and down 10 times. Exercise 2    Move your head side to side 10 times. Exercise 3    Move your head diagonally up and down 10 times. Exercise 4    Move your head diagonally up and down 10 times on the other side. Follow-up care is a key part of your treatment and safety. Be sure to make and go to all appointments, and call your doctor if you are having problems.  It's also a good idea to know your test results and keep a list of the medicines you take.  Where can you learn more? Go to http://marlo-estelita.info/. Enter F349 in the search box to learn more about \"Vertigo: Exercises. \"  Current as of: July 29, 2016  Content Version: 11.3  © 3165-4514 [employer]. Care instructions adapted under license by [employer] (which disclaims liability or warranty for this information). If you have questions about a medical condition or this instruction, always ask your healthcare professional. [place] [address] any warranty or liability for your use of this information. Eustachian Tube Problems: Care Instructions  Your Care Instructions    The eustachian (say \"you-STAY-shee-un\") tubes run between the inside of the ears and the throat. They keep air pressure stable in the ears. If your eustachian tubes become blocked, the air pressure in your ears changes. The fluids from a cold can clog eustachian tubes, causing pain in the ears. A quick change in air pressure can cause eustachian tubes to close up. This might happen when an airplane changes altitude or when a  goes up or down underwater. Eustachian tube problems often clear up on their own or after antibiotic treatment. If your tubes continue to be blocked, you may need surgery. Follow-up care is a key part of your treatment and safety. Be sure to make and go to all appointments, and call your doctor if you are having problems. It's also a good idea to know your test results and keep a list of the medicines you take. How can you care for yourself at home? · To ease ear pain, apply a warm washcloth or a heating pad set on low. There may be some drainage from the ear when the heat melts earwax. Put a cloth between the heat source and your skin. Do not use a heating pad with children. · If your doctor prescribed antibiotics, take them as directed. Do not stop taking them just because you feel better.  You need to take the full course of antibiotics. · Your doctor may recommend over-the-counter medicine. Be safe with medicines. Oral or nasal decongestants may relieve ear pain. Avoid decongestants that are combined with antihistamines, which tend to cause more blockage. But if allergies seem to be the problem, your doctor may recommend a combination. Be careful with cough and cold medicines. Don't give them to children younger than 6, because they don't work for children that age and can even be harmful. For children 6 and older, always follow all the instructions carefully. Make sure you know how much medicine to give and how long to use it. And use the dosing device if one is included. When should you call for help? Call your doctor now or seek immediate medical care if:  · You develop sudden, complete hearing loss. · You have severe pain or feel dizzy. · You have new or increasing pus or blood draining from your ear. · You have redness, swelling, or pain around or behind the ear. Watch closely for changes in your health, and be sure to contact your doctor if:  · You do not get better after 2 weeks. · You have any new symptoms, such as itching or a feeling of fullness in the ear. Where can you learn more? Go to http://marlo-estelita.info/. Enter Y822 in the search box to learn more about \"Eustachian Tube Problems: Care Instructions. \"  Current as of: May 4, 2017  Content Version: 11.3  © 0199-5501 [employer]. Care instructions adapted under license by [employer] (which disclaims liability or warranty for this information). If you have questions about a medical condition or this instruction, always ask your healthcare professional. [address] any warranty or liability for your use of this information. Advised protocol for clearing congestion:  Increase fluid intake, especially water to thin mucous and boost the immune system.   Avoid sugar and dairy while congested since they thicken mucous. Get plenty of rest!  Gargle 3 times daily and as needed in Listerine or warm salt water vinegar solutions (1 tsp salt, 1 tsp vinegar in 1 cup lukewarm water.)  Use OTC nasal saline spray up each nostril twice daily. Use humidifier at bedtime. Use OTC Mucinex 600 mg twice daily to loosen mucous. Use OTC Tylenol Arthritis or Ibuprofen up to 800 mg up to 3 times daily as needed for pain, fever or headaches. Avoid decongestants and Ibuprofen if you have high blood pressure! If mucous is consistently discolored yellow or green throughout the day for more than a week, call the doctor for an evaluation. Eustachian Tube Problems: Care Instructions  Your Care Instructions    The eustachian (say \"you-STAY-shee-un\") tubes run between the inside of the ears and the throat. They keep air pressure stable in the ears. If your eustachian tubes become blocked, the air pressure in your ears changes. The fluids from a cold can clog eustachian tubes, causing pain in the ears. A quick change in air pressure can cause eustachian tubes to close up. This might happen when an airplane changes altitude or when a  goes up or down underwater. Eustachian tube problems often clear up on their own or after antibiotic treatment. If your tubes continue to be blocked, you may need surgery. Follow-up care is a key part of your treatment and safety. Be sure to make and go to all appointments, and call your doctor if you are having problems. It's also a good idea to know your test results and keep a list of the medicines you take. How can you care for yourself at home? · To ease ear pain, apply a warm washcloth or a heating pad set on low. There may be some drainage from the ear when the heat melts earwax. Put a cloth between the heat source and your skin. Do not use a heating pad with children. · If your doctor prescribed antibiotics, take them as directed.  Do not stop taking them just because you feel better. You need to take the full course of antibiotics. · Your doctor may recommend over-the-counter medicine. Be safe with medicines. Oral or nasal decongestants may relieve ear pain. Avoid decongestants that are combined with antihistamines, which tend to cause more blockage. But if allergies seem to be the problem, your doctor may recommend a combination. Be careful with cough and cold medicines. Don't give them to children younger than 6, because they don't work for children that age and can even be harmful. For children 6 and older, always follow all the instructions carefully. Make sure you know how much medicine to give and how long to use it. And use the dosing device if one is included. When should you call for help? Call your doctor now or seek immediate medical care if:  · You develop sudden, complete hearing loss. · You have severe pain or feel dizzy. · You have new or increasing pus or blood draining from your ear. · You have redness, swelling, or pain around or behind the ear. Watch closely for changes in your health, and be sure to contact your doctor if:  · You do not get better after 2 weeks. · You have any new symptoms, such as itching or a feeling of fullness in the ear. Where can you learn more? Go to http://marlo-estelita.info/. Enter Y822 in the search box to learn more about \"Eustachian Tube Problems: Care Instructions. \"  Current as of: May 4, 2017  Content Version: 11.3  © 2174-9095 [employer]. Care instructions adapted under license by [employer] (which disclaims liability or warranty for this information). If you have questions about a medical condition or this instruction, always ask your healthcare professional. [place] [address] any warranty or liability for your use of this information.

## 2017-10-16 NOTE — MR AVS SNAPSHOT
Visit Information Date & Time Provider Department Dept. Phone Encounter #  
 10/16/2017 10:30 AM Bouchra Nobles DO [facility] [phone] [id number] Follow-up Instructions Return in about 3 months (around 1/16/2018) for bp, weight, allergies. Upcoming Health Maintenance Date Due  
 BREAST CANCER SCRN MAMMOGRAM 3/28/2019 PAP AKA CERVICAL CYTOLOGY 3/28/2020 COLONOSCOPY 9/19/2026 DTaP/Tdap/Td series (2 - Td) 3/28/2027 Allergies as of 10/16/2017  Review Complete On: 10/16/2017 By: Bouchra Nobles DO Severity Noted Reaction Type Reactions Prempro [Conj Estrog-medroxyprogest Ace]  08/08/2011    Other (comments) Benign Right breast mass. Current Immunizations  Reviewed on 10/16/2017 Name Date Influenza Vaccine 10/6/2017, 10/1/2016, 10/1/2014 Influenza Vaccine Split 10/21/2011, 10/18/2010 TD Vaccine 8/31/2004 Tdap 3/28/2017 Reviewed by Bouchra Nobles DO on 10/16/2017 at 11:59 AM  
You Were Diagnosed With   
  
 Codes Comments Allergic conjunctivitis of both eyes and rhinitis    -  Primary ICD-10-CM: H10.13, J30.9 ICD-9-CM: 372.05, 477.9 Essential hypertension with goal blood pressure less than 140/90     ICD-10-CM: I10 
ICD-9-CM: 401.9 Chronic fatigue     ICD-10-CM: R53.82 
ICD-9-CM: 780.79 due to allergies uncontrolled vs other Dyslipidemia     ICD-10-CM: E78.5 ICD-9-CM: 272.4 Dizziness     ICD-10-CM: C44 ICD-9-CM: 780.4 lightheaded due to ETD vs blood sugar vs blood pressure vs Lisinopril vs other Hypoglycemia     ICD-10-CM: E16.2 ICD-9-CM: 251.2 Heart palpitations     ICD-10-CM: R00.2 ICD-9-CM: 785.1 stable Heartburn     ICD-10-CM: R12 
ICD-9-CM: 787.1 stable off Protonix x 1 week Weight loss     ICD-10-CM: R63.4 ICD-9-CM: 783.21 1.5# due to decreased breakfast portions vs other Breast cancer screening     ICD-10-CM: Z12.31 
ICD-9-CM: V76.10 Vitals BP Pulse Temp Resp Height(growth percentile) Weight(growth percentile) (!) 141/97 (BP 1 Location: Right arm, BP Patient Position: Sitting) 70 98.5 °F (36.9 °C) (Oral) 16 5' 5.98\" (1.676 m) 103 lb 6.4 oz (46.9 kg) SpO2 BMI OB Status Smoking Status 98% 16.7 kg/m2 Postmenopausal Former Smoker Vitals History BMI and BSA Data Body Mass Index Body Surface Area 16.7 kg/m 2 1.48 m 2 Preferred Pharmacy Pharm[facility] Name Phone [phone] Your Updated Medication List  
  
   
This list is accurate as of: 10/16/17 12:04 PM.  Always use your most recent med list.  
  
  
  
  
 ALPRAZolam 0.25 mg tablet Commonly known as:  Fabián Beecham Take 1 Tab by mouth three (3) times daily as needed for Anxiety. Max Daily Amount: 0.75 mg. Indications: situational anxiety  
  
 aspirin delayed-release 81 mg tablet Take 1 Tab by mouth daily. cetirizine 10 mg tablet Commonly known as:  ZYRTEC Take  by mouth.  
  
 ergocalciferol 50,000 unit capsule Commonly known as:  ERGOCALCIFEROL Take 1 Cap by mouth every seven (7) days. FISH OIL 1,000 mg Cap Generic drug:  omega-3 fatty acids-vitamin e Take  by mouth daily. fluticasone 50 mcg/actuation nasal spray Commonly known as:  Domnick Means 2 Sprays by Both Nostrils route daily. lisinopril 20 mg tablet Commonly known as:  PRINIVIL, ZESTRIL  
TAKE 1 TABLET BY MOUTH DAILY  
  
 loratadine 10 mg tablet Commonly known as:  Garlan Baas Take 1 Tab by mouth daily. montelukast 10 mg tablet Commonly known as:  SINGULAIR Take 1 Tab by mouth daily. MULTIVITAMIN PO Take  by mouth. Takes 3 times/week  
  
 pantoprazole 40 mg tablet Commonly known as:  PROTONIX Take 1 Tab by mouth daily. Indications: Heartburn POTASSIUM PO Take  by mouth. simvastatin 40 mg tablet Commonly known as:  ZOCOR  
 TAKE 1 TABLET BY MOUTH AT BEDTIME  
  
 valACYclovir 500 mg tablet Commonly known as:  VALTREX  
TAKE 1 TABLET BY MOUTH EVERY DAY DURING FLARES  
  
 VITAMIN B-12 1,000 mcg tablet Generic drug:  cyanocobalamin Take 1,000 mcg by mouth daily. VITAMIN C 500 mg tablet Generic drug:  ascorbic acid (vitamin C) Take  by mouth. Prescriptions Sent to Pharmacy Refills  
 pantoprazole (PROTONIX) 40 mg tablet 1 Sig: Take 1 Tab by mouth daily. Indications: Heartburn Class: Normal  
 Pharmacy: [facility] #: [phone] Route: Oral  
  
Follow-up Instructions Return in about 3 months (around 1/16/2018) for bp, weight, allergies. To-Do List   
 10/16/2017 Imaging:  MARLENY MAMMO BI SCREENING INCL CAD Patient Instructions Indigestion (Dyspepsia or Heartburn): Care Instructions Your Care Instructions Sometimes it can be hard to pinpoint the cause of indigestion (dyspepsia or heartburn). Most cases of an upset stomach with bloating, burning, burping, and nausea are minor and go away within several hours. Home treatment and over-the-counter medicine often are able to control symptoms. But if you take medicine to relieve your indigestion without making diet and lifestyle changes, your symptoms are likely to return again and again. If you get indigestion often, it may be a sign of a more serious medical problem. Be sure to follow up with your doctor, who may want to do tests to be sure of the cause of your indigestion. Follow-up care is a key part of your treatment and safety. Be sure to make and go to all appointments, and call your doctor if you are having problems. It's also a good idea to know your test results and keep a list of the medicines you take. How can you care for yourself at home? · Your doctor may recommend over-the-counter medicine.  For mild or occasional indigestion, antacids such as Tums, Gaviscon, Mylanta, or Maalox may help. Be careful when you take over-the-counter antacid medicines. Many of these medicines have aspirin in them. Read the label to make sure that you are not taking more than the recommended dose. Too much aspirin can be harmful. · Your doctor also may recommend over-the-counter acid reducers, such as Pepcid AC, Tagamet HB, Zantac 75, or Prilosec. Read and follow all instructions on the label. If you use these medicines often, talk with your doctor. · Change your eating habits. ¨ It's best to eat several small meals instead of two or three large meals. ¨ After you eat, wait 2 to 3 hours before you lie down. ¨ Chocolate, mint, and alcohol can make GERD worse. ¨ Spicy foods, foods that have a lot of acid (like tomatoes and oranges), and coffee can make GERD symptoms worse in some people. If your symptoms are worse after you eat a certain food, you may want to stop eating that food to see if your symptoms get better. · Do not smoke or chew tobacco. Smoking can make GERD worse. If you need help quitting, talk to your doctor about stop-smoking programs and medicines. These can increase your chances of quitting for good. · If you have GERD symptoms at night, raise the head of your bed 6 to 8 inches by putting the frame on blocks or placing a foam wedge under the head of your mattress. (Adding extra pillows does not work.) · Do not wear tight clothing around your middle. · Lose weight if you need to. Losing just 5 to 10 pounds can help. · Do not take anti-inflammatory medicines, such as aspirin, ibuprofen (Advil, Motrin), or naproxen (Aleve). These can irritate the stomach. If you need a pain medicine, try acetaminophen (Tylenol), which does not cause stomach upset. When should you call for help? Call 911 anytime you think you may need emergency care. For example, call if: 
· You passed out (lost consciousness). · You vomit blood or what looks like coffee grounds. · You pass maroon or very bloody stools. · You have chest pain or pressure. This may occur with: ¨ Sweating. ¨ Shortness of breath. ¨ Nausea or vomiting. ¨ Pain that spreads from the chest to the neck, jaw, or one or both shoulders or arms. ¨ Feeling dizzy or lightheaded. ¨ A fast or uneven pulse. After calling 911, chew 1 adult-strength aspirin. Wait for an ambulance. Do not try to drive yourself. Call your doctor now or seek immediate medical care if: 
· You have severe belly pain. · Your stools are black and tarlike or have streaks of blood. · You have trouble swallowing. · You are losing weight and do not know why. Watch closely for changes in your health, and be sure to contact your doctor if: 
· You do not get better as expected. Where can you learn more? Go to http://marlo-estelita.info/. Enter C121 in the search box to learn more about \"Indigestion (Dyspepsia or Heartburn): Care Instructions. \" Current as of: November 16, 2016 Content Version: 11.3 © 7247-9139 SquadMail. Care instructions adapted under license by SoPost (which disclaims liability or warranty for this information). If you have questions about a medical condition or this instruction, always ask your healthcare professional. [place] [address] any warranty or liability for your use of this information. HEARTBURN occurs when stomach acid moves back up through your esophagus. Several conditions and foods can contribute to this process. Common causes include: 
 
Hiatal Hernia Pregnancy Alcohol use Overweight or Obesity Smoking. Consuming certain types of foods or drinks can increase the acid content of the stomach and cause heartburn. AVOID THESE TRIGGERS:  
 
Stress Alcoholic or carbonated beverages Caffeine (coffee, tea, chocolate, soda) Acidic foods or drinks (Oranges or orange juice, apples, apple juice, grapefruit or grapefruit juice, bananas, grape juice) Tomatoes or tomato based foods (pizza, spaghetti, chili) Greasy, Fatty or Fried foods Spicy Foods (including hot sauce) Garlic Onions Mint flavoring (peppermint, speramint, cinnamon). PREVENTIVE MEASURES Do not wear tight, restrictive clothing. Lose weight. Elevate the head of the bed 4 to 6 inches with blocks or a wedge pillow. Wait 2 to 3 hours after a meal before lying down. Avoid the triggers. MEDICATIONS Try over the counter medications if needed first.  These include: 
TUMS, ROLAIDS, Mylanta, Prilosec 20 mg, Pepcid 20 mg, Tagamet, Zantac up to 150 mg twice daily or 300 mg daily, Nexium 20 mg up to 40 mg daily and Prevacid up to 30 mg daily. Ideally, take for 2 weeks after symptoms consistently appear. NOTIFY OUR OFFICE If symptoms worsen or persist.  
If breathing or swallowing becomes difficult. If you regurgitate blood. DIAL 911 IF THE HEART BURN SYMPTOMS ARE ACCOMPANIED BY Shortness of breath, sweating, pain in the jaw, neck or arm, cold clammy feeling with nausea or vomiting. This could be a HEART ATTACK. Indigestion (Dyspepsia or Heartburn): Care Instructions Your Care Instructions Sometimes it can be hard to pinpoint the cause of indigestion (dyspepsia or heartburn). Most cases of an upset stomach with bloating, burning, burping, and nausea are minor and go away within several hours. Home treatment and over-the-counter medicine often are able to control symptoms. But if you take medicine to relieve your indigestion without making diet and lifestyle changes, your symptoms are likely to return again and again. If you get indigestion often, it may be a sign of a more serious medical problem. Be sure to follow up with your doctor, who may want to do tests to be sure of the cause of your indigestion. Follow-up care is a key part of your treatment and safety. Be sure to make and go to all appointments, and call your doctor if you are having problems. It's also a good idea to know your test results and keep a list of the medicines you take. How can you care for yourself at home? · Your doctor may recommend over-the-counter medicine. For mild or occasional indigestion, antacids such as Tums, Gaviscon, Mylanta, or Maalox may help. Be careful when you take over-the-counter antacid medicines. Many of these medicines have aspirin in them. Read the label to make sure that you are not taking more than the recommended dose. Too much aspirin can be harmful. · Your doctor also may recommend over-the-counter acid reducers, such as Pepcid AC, Tagamet HB, Zantac 75, or Prilosec. Read and follow all instructions on the label. If you use these medicines often, talk with your doctor. · Change your eating habits. ¨ It's best to eat several small meals instead of two or three large meals. ¨ After you eat, wait 2 to 3 hours before you lie down. ¨ Chocolate, mint, and alcohol can make GERD worse. ¨ Spicy foods, foods that have a lot of acid (like tomatoes and oranges), and coffee can make GERD symptoms worse in some people. If your symptoms are worse after you eat a certain food, you may want to stop eating that food to see if your symptoms get better. · Do not smoke or chew tobacco. Smoking can make GERD worse. If you need help quitting, talk to your doctor about stop-smoking programs and medicines. These can increase your chances of quitting for good. · If you have GERD symptoms at night, raise the head of your bed 6 to 8 inches by putting the frame on blocks or placing a foam wedge under the head of your mattress. (Adding extra pillows does not work.) · Do not wear tight clothing around your middle. · Lose weight if you need to. Losing just 5 to 10 pounds can help. · Do not take anti-inflammatory medicines, such as aspirin, ibuprofen (Advil, Motrin), or naproxen (Aleve). These can irritate the stomach. If you need a pain medicine, try acetaminophen (Tylenol), which does not cause stomach upset. When should you call for help? Call 911 anytime you think you may need emergency care. For example, call if: 
· You passed out (lost consciousness). · You vomit blood or what looks like coffee grounds. · You pass maroon or very bloody stools. · You have chest pain or pressure. This may occur with: ¨ Sweating. ¨ Shortness of breath. ¨ Nausea or vomiting. ¨ Pain that spreads from the chest to the neck, jaw, or one or both shoulders or arms. ¨ Feeling dizzy or lightheaded. ¨ A fast or uneven pulse. After calling 911, chew 1 adult-strength aspirin. Wait for an ambulance. Do not try to drive yourself. Call your doctor now or seek immediate medical care if: 
· You have severe belly pain. · Your stools are black and tarlike or have streaks of blood. · You have trouble swallowing. · You are losing weight and do not know why. Watch closely for changes in your health, and be sure to contact your doctor if: 
· You do not get better as expected. Where can you learn more? Go to http://marlo-estelita.info/. Enter G458 in the search box to learn more about \"Indigestion (Dyspepsia or Heartburn): Care Instructions. \" Current as of: November 16, 2016 Content Version: 11.3 © 2615-1744 SignalPoint Communications. Care instructions adapted under license by SIGFOX (which disclaims liability or warranty for this information). If you have questions about a medical condition or this instruction, always ask your healthcare professional. [address] any warranty or liability for your use of this information. Vertigo: Exercises Your Care Instructions Here are some examples of typical rehabilitation exercises for your condition. Start each exercise slowly. Ease off the exercise if you start to have pain. Your doctor or physical therapist will tell you when you can start these exercises and which ones will work best for you. How to do the exercises Note: Do these exercises twice a day. Try to progress to doing each head movement 15 to 20 times. Then try to do them with your eyes closed. Exercise 1 1. Stand with a chair in front of you and a wall behind you. If you begin to fall, you may use them for support. 2. Stand with your feet together and your arms at your sides. 3. Move your head up and down 10 times. Exercise 2 Move your head side to side 10 times. Exercise 3 Move your head diagonally up and down 10 times. Exercise 4 Move your head diagonally up and down 10 times on the other side. Follow-up care is a key part of your treatment and safety. Be sure to make and go to all appointments, and call your doctor if you are having problems. It's also a good idea to know your test results and keep a list of the medicines you take. Where can you learn more? Go to http://marloAkdemiaestelita.info/. Enter F349 in the search box to learn more about \"Vertigo: Exercises. \" Current as of: July 29, 2016 Content Version: 11.3 © 0420-7034 [employer]. Care instructions adapted under license by Infinio (which disclaims liability or warranty for this information). If you have questions about a medical condition or this instruction, always ask your healthcare professional. [place] [address] any warranty or liability for your use of this information. Eustachian Tube Problems: Care Instructions Your Care Instructions The eustachian (say \"you-STAY-shee-un\") tubes run between the inside of the ears and the throat. They keep air pressure stable in the ears.  If your eustachian tubes become blocked, the air pressure in your ears changes. The fluids from a cold can clog eustachian tubes, causing pain in the ears. A quick change in air pressure can cause eustachian tubes to close up. This might happen when an airplane changes altitude or when a  goes up or down underwater. Eustachian tube problems often clear up on their own or after antibiotic treatment. If your tubes continue to be blocked, you may need surgery. Follow-up care is a key part of your treatment and safety. Be sure to make and go to all appointments, and call your doctor if you are having problems. It's also a good idea to know your test results and keep a list of the medicines you take. How can you care for yourself at home? · To ease ear pain, apply a warm washcloth or a heating pad set on low. There may be some drainage from the ear when the heat melts earwax. Put a cloth between the heat source and your skin. Do not use a heating pad with children. · If your doctor prescribed antibiotics, take them as directed. Do not stop taking them just because you feel better. You need to take the full course of antibiotics. · Your doctor may recommend over-the-counter medicine. Be safe with medicines. Oral or nasal decongestants may relieve ear pain. Avoid decongestants that are combined with antihistamines, which tend to cause more blockage. But if allergies seem to be the problem, your doctor may recommend a combination. Be careful with cough and cold medicines. Don't give them to children younger than 6, because they don't work for children that age and can even be harmful. For children 6 and older, always follow all the instructions carefully. Make sure you know how much medicine to give and how long to use it. And use the dosing device if one is included. When should you call for help? Call your doctor now or seek immediate medical care if: 
· You develop sudden, complete hearing loss. · You have severe pain or feel dizzy. · You have new or increasing pus or blood draining from your ear. · You have redness, swelling, or pain around or behind the ear. Watch closely for changes in your health, and be sure to contact your doctor if: 
· You do not get better after 2 weeks. · You have any new symptoms, such as itching or a feeling of fullness in the ear. Where can you learn more? Go to http://marlo-estelita.info/. Enter Y822 in the search box to learn more about \"Eustachian Tube Problems: Care Instructions. \" Current as of: May 4, 2017 Content Version: 11.3 © 4999-4626 DealerTrack. Care instructions adapted under license by Embue (which disclaims liability or warranty for this information). If you have questions about a medical condition or this instruction, always ask your healthcare professional. [address] any warranty or liability for your use of this information. Advised protocol for clearing congestion:  Increase fluid intake, especially water to thin mucous and boost the immune system. Avoid sugar and dairy while congested since they thicken mucous. Get plenty of rest!  Gargle 3 times daily and as needed in Listerine or warm salt water vinegar solutions (1 tsp salt, 1 tsp vinegar in 1 cup lukewarm water.)  Use OTC nasal saline spray up each nostril twice daily. Use humidifier at bedtime. Use OTC Mucinex 600 mg twice daily to loosen mucous. Use OTC Tylenol Arthritis or Ibuprofen up to 800 mg up to 3 times daily as needed for pain, fever or headaches. Avoid decongestants and Ibuprofen if you have high blood pressure! If mucous is consistently discolored yellow or green throughout the day for more than a week, call the doctor for an evaluation. Eustachian Tube Problems: Care Instructions Your Care Instructions The eustachian (say \"you-STAY-shee-un\") tubes run between the inside of the ears and the throat. They keep air pressure stable in the ears. If your eustachian tubes become blocked, the air pressure in your ears changes. The fluids from a cold can clog eustachian tubes, causing pain in the ears. A quick change in air pressure can cause eustachian tubes to close up. This might happen when an airplane changes altitude or when a  goes up or down underwater. Eustachian tube problems often clear up on their own or after antibiotic treatment. If your tubes continue to be blocked, you may need surgery. Follow-up care is a key part of your treatment and safety. Be sure to make and go to all appointments, and call your doctor if you are having problems. It's also a good idea to know your test results and keep a list of the medicines you take. How can you care for yourself at home? · To ease ear pain, apply a warm washcloth or a heating pad set on low. There may be some drainage from the ear when the heat melts earwax. Put a cloth between the heat source and your skin. Do not use a heating pad with children. · If your doctor prescribed antibiotics, take them as directed. Do not stop taking them just because you feel better. You need to take the full course of antibiotics. · Your doctor may recommend over-the-counter medicine. Be safe with medicines. Oral or nasal decongestants may relieve ear pain. Avoid decongestants that are combined with antihistamines, which tend to cause more blockage. But if allergies seem to be the problem, your doctor may recommend a combination. Be careful with cough and cold medicines. Don't give them to children younger than 6, because they don't work for children that age and can even be harmful. For children 6 and older, always follow all the instructions carefully. Make sure you know how much medicine to give and how long to use it. And use the dosing device if one is included. When should you call for help? Call your doctor now or seek immediate medical care if: 
· You develop sudden, complete hearing loss. · You have severe pain or feel dizzy. · You have new or increasing pus or blood draining from your ear. · You have redness, swelling, or pain around or behind the ear. Watch closely for changes in your health, and be sure to contact your doctor if: 
· You do not get better after 2 weeks. · You have any new symptoms, such as itching or a feeling of fullness in the ear. Where can you learn more? Go to http://marlo-estelita.info/. Enter Y822 in the search box to learn more about \"Eustachian Tube Problems: Care Instructions. \" Current as of: May 4, 2017 Content Version: 11.3 © 6799-9965 [employer]alia. Care instructions adapted under license by ZillionTV (which disclaims liability or warranty for this information). If you have questions about a medical condition or this instruction, always ask your healthcare professional. [address] any warranty or liability for your use of this information. Introducing [facility]! Dear Macky Kussmaul: Thank you for requesting a [employer] account. Our records indicate that you already have an active [employer] account. You can access your account anytime at [email or web address] Did you know that you can access your hospital and ER discharge instructions at any time in [employer]? You can also review all of your test results from your hospital stay or ER visit. Additional Information If you have questions, please visit the Frequently Asked Questions section of the [employer] website at [email or web address] Remember, [employer] is NOT to be used for urgent needs. For medical emergencies, dial 911. Now available from your iPhone and Android! Please provide this summary of care documentation to your next provider. Your primary care clinician is listed as Aron Triplett. If you have any questions after today's visit, please call [phone].

## 2017-10-16 NOTE — PROGRESS NOTES
Chief Complaint   Patient presents with    Weight Management    Blood Pressure Check    Nasal Congestion    Medication Refill     1. Have you been to the ER, urgent care clinic since your last visit? Hospitalized since your last visit? No    2. Have you seen or consulted any other health care providers outside of the [address] [place] [address] since your last visit? Include any pap smears or colon screening. No    In the event something were to happen to you and you were unable to speak on your behalf, do you have an Advance Directive/ Living Will in place stating your wishes? NO    If yes, do we have a copy on file NO    If no, would you like information:   Pt offered and accepted. Pt got her flu shot last week; thinks she may be coming down with a cold or sinus infection or possibly allergies acting up; c/o runny nose, scratchy throat, and congestion. Denies any body aches.

## 2017-10-16 NOTE — PROGRESS NOTES
HISTORY OF PRESENT ILLNESS  Garrett Valdez is a 58 y.o. female presents with Weight Management; Blood Pressure Check; Nasal Congestion (w/ discharge and scratchy throat ); Medication Refill; Headache; and Dizziness    Agree with nurse note. Hypertensive pt with dyslipidemia, hx of hypoglycemia, and hx of heart palpitations presents to the office with a BP of 141/97. She has not taken her BP medication, Lisinopril 20 mg, x2 days because she drank alcohol over the weekend and did not want to mix medication with alcohol. She has had R temple headaches recently and felt dizzy or lightheaded. She does not check her BPs at home. No recurrence of heart palpitations. Patient denies vision changes, chest pain, SOB, swelling, or other associated sxs. She complains of nasal congestion with yellow discharge in the mornings, itchy eyes, scratchy throat, dry cough, and sneezing. She had chills which improved with hot tea and rest. She has also felt very tired the last few weeks. She takes Singulair 10 mg daily, Claritin 10 mg daily, and Flonase every other day. She reports seeing her optometrist, Dr. Sarah Saldaña recently and was told she has floaters. She also has been using allergy eye drops that were recommended. She ran out of Protonix 40 mg about a week ago and requests a refill. No heartburn flare ups since being off. She weighs 103 lbs, lost ~1.5 lbs since 06/2017. She notes she eats less for breakfast because she does not feel like cooking. Mom and dad have similar frames. Feels well. Health Maintenance    She has a mammogram scheduled for 10/30/17 at [facility]. Written by shawna Gan, as dictated by Dr. Oswald Villarreal DO.    ROS    Review of Systems negative except as noted above in HPI. ALLERGIES:    Allergies   Allergen Reactions    Prempro [Conj Estrog-Medroxyprogest Ace] Other (comments)     Benign Right breast mass.         CURRENT MEDICATIONS: Outpatient Prescriptions Marked as Taking for the 10/16/17 encounter (Office Visit) with James Jin, DO   Medication Sig Dispense Refill    pantoprazole (PROTONIX) 40 mg tablet Take 1 Tab by mouth daily. Indications: Heartburn 90 Tab 1    lisinopril (PRINIVIL, ZESTRIL) 20 mg tablet TAKE 1 TABLET BY MOUTH DAILY 30 Tab 0    valACYclovir (VALTREX) 500 mg tablet TAKE 1 TABLET BY MOUTH EVERY DAY DURING FLARES 90 Tab 3    POTASSIUM PO Take  by mouth.  ALPRAZolam (XANAX) 0.25 mg tablet Take 1 Tab by mouth three (3) times daily as needed for Anxiety. Max Daily Amount: 0.75 mg. Indications: situational anxiety 15 Tab 1    aspirin delayed-release 81 mg tablet Take 1 Tab by mouth daily. 90 Tab 3    ergocalciferol (ERGOCALCIFEROL) 50,000 unit capsule Take 1 Cap by mouth every seven (7) days. 4 Cap 2    loratadine (CLARITIN) 10 mg tablet Take 1 Tab by mouth daily. 15 Tab 0    fluticasone (FLONASE) 50 mcg/actuation nasal spray 2 Sprays by Both Nostrils route daily. 1 Bottle 0    cetirizine (ZYRTEC) 10 mg tablet Take  by mouth.  montelukast (SINGULAIR) 10 mg tablet Take 1 Tab by mouth daily. 30 Tab 5    simvastatin (ZOCOR) 40 mg tablet TAKE 1 TABLET BY MOUTH AT BEDTIME 90 tablet 3    cyanocobalamin (VITAMIN B-12) 1,000 mcg tablet Take 1,000 mcg by mouth daily.  ascorbic acid (VITAMIN C) 500 mg tablet Take  by mouth.  omega-3 fatty acids-vitamin e (FISH OIL) 1,000 mg Cap Take  by mouth daily.  MULTIVITAMINS (MULTIVITAMIN PO) Take  by mouth. Takes 3 times/week         PAST MEDICAL HISTORY:    Past Medical History:   Diagnosis Date    Allergic rhinitis, cause unspecified     Arthritis     fingers and toes    Breast mass 06/1999    Right. Benign. due to Prempro. Dr. Sumner Estimable    Chickenpox childhood    Dizziness 10/2011    Dr. Juve Nguyen.  EBV positive mononucleosis syndrome 10/2010    positive titer.     Exposure to hepatitis B         Genital HSV     HA (headache)     Heart palpitations 05/21/09    Dr. Yael Cunningham    Hypoglycemia     Menopausal and postmenopausal disorder 1999    MRSA infection 12/2011    Right hip. Txd Bactrim.  Osteopenia     Dr. Liseth Archuleta. Dr. Omkar Estes.  Other and unspecified hyperlipidemia     Raynaud phenomenon     RLS (restless legs syndrome) 2008    Shingles teenager    R arm    Vitamin D deficiency 2008       PAST SURGICAL HISTORY:    Past Surgical History:   Procedure Laterality Date    COLONOSCOPY  09/19/2016    Dr. Lashell Zaragoza. due q 10 yrs.  HX BREAST LUMPECTOMY  1999    Benign. Dr. Anayeli Ornelas HX ORTHOPAEDIC Right 10/2012    Right arm. BENIGN TUMOR REMOVED. Dr. Charo Gonzales:    Family History   Problem Relation Age of Onset    Other Mother      gallstones    Breast Cancer Mother 52     unilateral.   Iona Burner Mother 62     Breast Cancer    Hypertension Father     Stroke Father 70     x2 CVAs and several TIAs    Kidney Disease Father     Asthma Paternal Uncle      x 2    Stroke Paternal Aunt     Diabetes Paternal Aunt     Heart Attack Paternal Aunt     Other Paternal Aunt      gout    Diabetes Paternal Aunt     Cancer Maternal Grandmother      uterine    Colon Cancer Maternal Grandmother 79    Cancer Maternal Aunt      lung    Cancer Other      maternal cousins     Breast Cancer Other 28     x 2.  bilaterally.     Alzheimer Maternal Aunt      x 2       SOCIAL HISTORY:    Social History     Social History    Marital status:      Spouse name: N/A    Number of children: N/A    Years of education: N/A     Social History Main Topics    Smoking status: Former Smoker     Packs/day: 0.50     Years: 10.00     Types: Cigarettes     Quit date: 1/1/1985    Smokeless tobacco: Never Used    Alcohol use 1.5 oz/week     3 Standard drinks or equivalent per week      Comment: Occasional    Drug use: No    Sexual activity: Yes     Partners: Male     Birth control/ protection: None Other Topics Concern    None     Social History Narrative       IMMUNIZATIONS:    Immunization History   Administered Date(s) Administered    Influenza Vaccine 10/01/2014, 10/01/2016, 10/06/2017    Influenza Vaccine Split 10/18/2010, 10/21/2011    TD Vaccine 08/31/2004    Tdap 03/28/2017         PHYSICAL EXAMINATION    Vital Signs    Visit Vitals    BP (!) 141/97 (BP 1 Location: Right arm, BP Patient Position: Sitting)    Pulse 70    Temp 98.5 °F (36.9 °C) (Oral)    Resp 16    Ht 5' 5.98\" (1.676 m)    Wt 103 lb 6.4 oz (46.9 kg)    SpO2 98%    BMI 16.7 kg/m2       Weight Metrics 10/16/2017 6/28/2017 3/28/2017 2/18/2017 9/26/2016 10/20/2015 4/21/2015   Weight 103 lb 6.4 oz 105 lb 106 lb 11.2 oz 107 lb 110 lb 6.4 oz 110 lb 14.4 oz 112 lb 8 oz   BMI 16.7 kg/m2 16.96 kg/m2 17.22 kg/m2 17.27 kg/m2 17.82 kg/m2 17.91 kg/m2 18.17 kg/m2       General appearance - Well nourished. Well appearing. Well developed. No acute distress. Underweight. Head - Normocephalic. Atraumatic. Eyes - pupils equal and reactive. Extraocular eye movements intact. Sclera anicteric. Mildly injected sclera. Mild infraorbital edema noted BL. Ears - Hearing is grossly normal bilaterally. L EAC 70% occluded with dark brown cerumen. Moderate TM retraction BL. Nose - normal and patent. No polyps noted. No erythema. No discharge. Mouth - mucous membranes with adequate moisture. Posterior pharynx normal with cobblestone appearance. No erythema, white exudate or obstruction. Neck - supple. Midline trachea. No carotid bruits noted bilaterally. No thyromegaly noted. Chest - clear to auscultation bilaterally anteriorly and posteriorly. No wheezes. No rales or rhonchi. Breath sounds are symmetrical bilaterally. Unlabored respirations. Heart - normal rate. Regular rhythm. Normal S1, S2. No murmur noted. No rubs, clicks or gallops noted. Abdomen - soft and nondistended. No masses or organomegaly.   No rebound, rigidity or guarding. Bowel sounds normal x 4 quadrants. No tenderness noted. Neurological - awake, alert and oriented to person, place, and time and event. Cranial nerves II through XII intact. Clear speech. Muscle strength is +5/5 x 4 extremities. Sensation is intact to light touch bilaterally. Steady gait. Heme/Lymph - peripheral pulses normal x 4 extremities. No peripheral edema is noted. Psychological -   normal behavior, dress and thought processes. Good insight. Good eye contact. Normal affect. Appropriate mood. Normal speech. DATA REVIEWED    Lab Results   Component Value Date/Time    WBC 3.7 10/13/2015 09:41 AM    WBC 3.8 05/29/2012 09:00 AM    HGB 12.3 10/13/2015 09:41 AM    HCT 38.2 10/13/2015 09:41 AM    PLATELET 748 50/33/0243 09:41 AM    MCV 98 10/13/2015 09:41 AM     Lab Results   Component Value Date/Time    Sodium 141 03/21/2017 08:11 AM    Potassium 4.6 03/21/2017 08:11 AM    Chloride 100 03/21/2017 08:11 AM    CO2 24 03/21/2017 08:11 AM    Anion gap 9 10/18/2010 11:28 AM    Glucose 77 03/21/2017 08:11 AM    BUN 11 03/21/2017 08:11 AM    Creatinine 0.86 03/21/2017 08:11 AM    BUN/Creatinine ratio 13 03/21/2017 08:11 AM    GFR est AA 84 03/21/2017 08:11 AM    GFR est non-AA 73 03/21/2017 08:11 AM    Calcium 9.4 03/21/2017 08:11 AM    Bilirubin, total 0.7 03/21/2017 08:11 AM    AST (SGOT) 14 03/21/2017 08:11 AM    Alk.  phosphatase 71 03/21/2017 08:11 AM    Protein, total 7.1 03/21/2017 08:11 AM    Albumin 4.8 03/21/2017 08:11 AM    Globulin 2.9 10/18/2010 11:28 AM    A-G Ratio 2.1 03/21/2017 08:11 AM    ALT (SGPT) 10 03/21/2017 08:11 AM     Lab Results   Component Value Date/Time    Cholesterol, total 177 03/21/2017 08:11 AM    HDL Cholesterol 71 03/21/2017 08:11 AM    LDL, calculated 94 03/21/2017 08:11 AM    VLDL, calculated 12 03/21/2017 08:11 AM    Triglyceride 58 03/21/2017 08:11 AM    CHOL/HDL Ratio 3.2 10/18/2010 11:28 AM     Lab Results   Component Value Date/Time Vitamin D 25-Hydroxy 15.1 04/11/2011 09:00 AM    VITAMIN D, 25-HYDROXY 26.2 03/21/2017 08:11 AM       Lab Results   Component Value Date/Time    Hemoglobin A1c 5.1 03/21/2017 08:11 AM     Lab Results   Component Value Date/Time    TSH 1.480 03/21/2017 08:11 AM     Lab Results   Component Value Date/Time    Microalb/Creat ratio (ug/mg creat.) <5.4 03/22/2017 10:34 AM       ASSESSMENT and PLAN      ICD-10-CM ICD-9-CM    1. Allergic conjunctivitis of both eyes and rhinitis H10.13 372.05     J30.9 477.9    2. Essential hypertension with goal blood pressure less than 140/90 I10 401.9    3. Chronic fatigue R53.82 780.79     due to allergies uncontrolled vs other   4. Dyslipidemia E78.5 272.4    5. Dizziness R42 780.4     lightheaded due to ETD vs blood sugar vs blood pressure vs Lisinopril vs other   6. Hypoglycemia E16.2 251.2    7. Heart palpitations R00.2 785.1     stable   8. Heartburn R12 787.1 pantoprazole (PROTONIX) 40 mg tablet    stable off Protonix x 1 week   9. Weight loss R63.4 783.21     1.5# due to decreased breakfast portions vs other   10. Breast cancer screening Z12.31 V76.10 [facility] MAMMO BI SCREENING INCL CAD       Discussed the patient's BMI with her. The BMI follow up plan is as follows: I have counseled this patient on diet and exercise regimens. Increase water intake. Avoid sugar and dairy to thin mucus. Avoid heartburn triggers. Get 7-8 hours uninterrupted sleep nightly. Chart reviewed and updated. Pt declines ear irrigation in office today; prefers OTC option and will let us know if worsens. Continue current medications and care. Take Protonix prn. Take Singulair, Claritin, and Flonase daily x2-3 weeks. Prescriptions written and sent to pharmacy; medication side effects discussed. Protonix 40 mg. Mammogram ordered. Most recent tests reviewed from 03/2017. Get recent office visit notes from Dr. Bairon Jc. Advised pt to sign release.     Counseled patient on health concerns:  BP, allergies, dizziness, and heartburn. Relevant handouts given and discussed with patient. Immunizations noted. Offered empathy, support, legitimation, prayers, partnership to patient. Praised patient for progress. Follow-up Disposition:  Return in about 3 months (around 1/16/2018) for bp, weight, allergies. Patient was offered a choice/choices in the treatment plan today. Patient expresses understanding of the plan and agrees with recommendations. Written by shawna Vaughn, as dictated by Dr. Angela Sow DO. Documentation True and Accepted by Patricia Powers. Deshaun Marrufo. Patient Instructions        Indigestion (Dyspepsia or Heartburn): Care Instructions  Your Care Instructions  Sometimes it can be hard to pinpoint the cause of indigestion (dyspepsia or heartburn). Most cases of an upset stomach with bloating, burning, burping, and nausea are minor and go away within several hours. Home treatment and over-the-counter medicine often are able to control symptoms. But if you take medicine to relieve your indigestion without making diet and lifestyle changes, your symptoms are likely to return again and again. If you get indigestion often, it may be a sign of a more serious medical problem. Be sure to follow up with your doctor, who may want to do tests to be sure of the cause of your indigestion. Follow-up care is a key part of your treatment and safety. Be sure to make and go to all appointments, and call your doctor if you are having problems. It's also a good idea to know your test results and keep a list of the medicines you take. How can you care for yourself at home? · Your doctor may recommend over-the-counter medicine. For mild or occasional indigestion, antacids such as Tums, Gaviscon, Mylanta, or Maalox may help. Be careful when you take over-the-counter antacid medicines. Many of these medicines have aspirin in them.  Read the label to make sure that you are not taking more than the recommended dose. Too much aspirin can be harmful. · Your doctor also may recommend over-the-counter acid reducers, such as Pepcid AC, Tagamet HB, Zantac 75, or Prilosec. Read and follow all instructions on the label. If you use these medicines often, talk with your doctor. · Change your eating habits. ¨ It's best to eat several small meals instead of two or three large meals. ¨ After you eat, wait 2 to 3 hours before you lie down. ¨ Chocolate, mint, and alcohol can make GERD worse. ¨ Spicy foods, foods that have a lot of acid (like tomatoes and oranges), and coffee can make GERD symptoms worse in some people. If your symptoms are worse after you eat a certain food, you may want to stop eating that food to see if your symptoms get better. · Do not smoke or chew tobacco. Smoking can make GERD worse. If you need help quitting, talk to your doctor about stop-smoking programs and medicines. These can increase your chances of quitting for good. · If you have GERD symptoms at night, raise the head of your bed 6 to 8 inches by putting the frame on blocks or placing a foam wedge under the head of your mattress. (Adding extra pillows does not work.)  · Do not wear tight clothing around your middle. · Lose weight if you need to. Losing just 5 to 10 pounds can help. · Do not take anti-inflammatory medicines, such as aspirin, ibuprofen (Advil, Motrin), or naproxen (Aleve). These can irritate the stomach. If you need a pain medicine, try acetaminophen (Tylenol), which does not cause stomach upset. When should you call for help? Call 911 anytime you think you may need emergency care. For example, call if:  · You passed out (lost consciousness). · You vomit blood or what looks like coffee grounds. · You pass maroon or very bloody stools. · You have chest pain or pressure. This may occur with:  ¨ Sweating. ¨ Shortness of breath. ¨ Nausea or vomiting.   ¨ Pain that spreads from the chest to the neck, jaw, or one or both shoulders or arms. ¨ Feeling dizzy or lightheaded. ¨ A fast or uneven pulse. After calling 911, chew 1 adult-strength aspirin. Wait for an ambulance. Do not try to drive yourself. Call your doctor now or seek immediate medical care if:  · You have severe belly pain. · Your stools are black and tarlike or have streaks of blood. · You have trouble swallowing. · You are losing weight and do not know why. Watch closely for changes in your health, and be sure to contact your doctor if:  · You do not get better as expected. Where can you learn more? Go to http://marlo-estelita.info/. Enter R045 in the search box to learn more about \"Indigestion (Dyspepsia or Heartburn): Care Instructions. \"  Current as of: November 16, 2016  Content Version: 11.3  © 2558-5303 [employer]. Care instructions adapted under license by [employer] (which disclaims liability or warranty for this information). If you have questions about a medical condition or this instruction, always ask your healthcare professional. [place] [address] any warranty or liability for your use of this information. HEARTBURN occurs when stomach acid moves back up through your esophagus. Several conditions and foods can contribute to this process. Common causes include:    Hiatal Hernia  Pregnancy  Alcohol use  Overweight or Obesity  Smoking. Consuming certain types of foods or drinks can increase the acid content of the stomach and cause heartburn.   AVOID THESE TRIGGERS:     Stress  Alcoholic or carbonated beverages  Caffeine (coffee, tea, chocolate, soda)  Acidic foods or drinks (Oranges or orange juice, apples, apple juice, grapefruit or grapefruit juice, bananas, grape juice)  Tomatoes or tomato based foods (pizza, spaghetti, chili)  Greasy, Fatty or Fried foods  Spicy Foods (including hot sauce)  Garlic  Onions  Mint flavoring (peppermint, speramint, cinnamon). PREVENTIVE MEASURES    Do not wear tight, restrictive clothing. Lose weight. Elevate the head of the bed 4 to 6 inches with blocks or a wedge pillow. Wait 2 to 3 hours after a meal before lying down. Avoid the triggers. MEDICATIONS     Try over the counter medications if needed first.  These include:  TUMS, ROLAIDS, Mylanta, Prilosec 20 mg, Pepcid 20 mg, Tagamet, Zantac up to 150 mg twice daily or 300 mg daily, Nexium 20 mg up to 40 mg daily and Prevacid up to 30 mg daily. Ideally, take for 2 weeks after symptoms consistently appear. NOTIFY OUR OFFICE    If symptoms worsen or persist.   If breathing or swallowing becomes difficult. If you regurgitate blood. DIAL 911 IF THE HEART BURN SYMPTOMS ARE ACCOMPANIED BY   Shortness of breath, sweating, pain in the jaw, neck or arm, cold clammy feeling with nausea or vomiting. This could be a HEART ATTACK. Indigestion (Dyspepsia or Heartburn): Care Instructions  Your Care Instructions  Sometimes it can be hard to pinpoint the cause of indigestion (dyspepsia or heartburn). Most cases of an upset stomach with bloating, burning, burping, and nausea are minor and go away within several hours. Home treatment and over-the-counter medicine often are able to control symptoms. But if you take medicine to relieve your indigestion without making diet and lifestyle changes, your symptoms are likely to return again and again. If you get indigestion often, it may be a sign of a more serious medical problem. Be sure to follow up with your doctor, who may want to do tests to be sure of the cause of your indigestion. Follow-up care is a key part of your treatment and safety. Be sure to make and go to all appointments, and call your doctor if you are having problems. It's also a good idea to know your test results and keep a list of the medicines you take. How can you care for yourself at home? · Your doctor may recommend over-the-counter medicine.  For mild or occasional indigestion, antacids such as Tums, Gaviscon, Mylanta, or Maalox may help. Be careful when you take over-the-counter antacid medicines. Many of these medicines have aspirin in them. Read the label to make sure that you are not taking more than the recommended dose. Too much aspirin can be harmful. · Your doctor also may recommend over-the-counter acid reducers, such as Pepcid AC, Tagamet HB, Zantac 75, or Prilosec. Read and follow all instructions on the label. If you use these medicines often, talk with your doctor. · Change your eating habits. ¨ It's best to eat several small meals instead of two or three large meals. ¨ After you eat, wait 2 to 3 hours before you lie down. ¨ Chocolate, mint, and alcohol can make GERD worse. ¨ Spicy foods, foods that have a lot of acid (like tomatoes and oranges), and coffee can make GERD symptoms worse in some people. If your symptoms are worse after you eat a certain food, you may want to stop eating that food to see if your symptoms get better. · Do not smoke or chew tobacco. Smoking can make GERD worse. If you need help quitting, talk to your doctor about stop-smoking programs and medicines. These can increase your chances of quitting for good. · If you have GERD symptoms at night, raise the head of your bed 6 to 8 inches by putting the frame on blocks or placing a foam wedge under the head of your mattress. (Adding extra pillows does not work.)  · Do not wear tight clothing around your middle. · Lose weight if you need to. Losing just 5 to 10 pounds can help. · Do not take anti-inflammatory medicines, such as aspirin, ibuprofen (Advil, Motrin), or naproxen (Aleve). These can irritate the stomach. If you need a pain medicine, try acetaminophen (Tylenol), which does not cause stomach upset. When should you call for help? Call 911 anytime you think you may need emergency care. For example, call if:  · You passed out (lost consciousness).   · You vomit blood or what looks like coffee grounds. · You pass maroon or very bloody stools. · You have chest pain or pressure. This may occur with:  ¨ Sweating. ¨ Shortness of breath. ¨ Nausea or vomiting. ¨ Pain that spreads from the chest to the neck, jaw, or one or both shoulders or arms. ¨ Feeling dizzy or lightheaded. ¨ A fast or uneven pulse. After calling 911, chew 1 adult-strength aspirin. Wait for an ambulance. Do not try to drive yourself. Call your doctor now or seek immediate medical care if:  · You have severe belly pain. · Your stools are black and tarlike or have streaks of blood. · You have trouble swallowing. · You are losing weight and do not know why. Watch closely for changes in your health, and be sure to contact your doctor if:  · You do not get better as expected. Where can you learn more? Go to http://marlo-estelita.info/. Enter X073 in the search box to learn more about \"Indigestion (Dyspepsia or Heartburn): Care Instructions. \"  Current as of: November 16, 2016  Content Version: 11.3  © 0269-9499 [employer]. Care instructions adapted under license by [employer] (which disclaims liability or warranty for this information). If you have questions about a medical condition or this instruction, always ask your healthcare professional. [place] [address] any warranty or liability for your use of this information. Vertigo: Exercises  Your Care Instructions  Here are some examples of typical rehabilitation exercises for your condition. Start each exercise slowly. Ease off the exercise if you start to have pain. Your doctor or physical therapist will tell you when you can start these exercises and which ones will work best for you. How to do the exercises  Note: Do these exercises twice a day. Try to progress to doing each head movement 15 to 20 times. Then try to do them with your eyes closed. Exercise 1    1.  Stand with a chair in front of you and a wall behind you. If you begin to fall, you may use them for support. 2. Stand with your feet together and your arms at your sides. 3. Move your head up and down 10 times. Exercise 2    Move your head side to side 10 times. Exercise 3    Move your head diagonally up and down 10 times. Exercise 4    Move your head diagonally up and down 10 times on the other side. Follow-up care is a key part of your treatment and safety. Be sure to make and go to all appointments, and call your doctor if you are having problems. It's also a good idea to know your test results and keep a list of the medicines you take. Where can you learn more? Go to http://marlo-estelita.info/. Enter F349 in the search box to learn more about \"Vertigo: Exercises. \"  Current as of: July 29, 2016  Content Version: 11.3  © 1181-0701 [employer]. Care instructions adapted under license by [employer] (which disclaims liability or warranty for this information). If you have questions about a medical condition or this instruction, always ask your healthcare professional. [place] [address] any warranty or liability for your use of this information. Eustachian Tube Problems: Care Instructions  Your Care Instructions    The eustachian (say \"you-STAY-shee-un\") tubes run between the inside of the ears and the throat. They keep air pressure stable in the ears. If your eustachian tubes become blocked, the air pressure in your ears changes. The fluids from a cold can clog eustachian tubes, causing pain in the ears. A quick change in air pressure can cause eustachian tubes to close up. This might happen when an airplane changes altitude or when a  goes up or down underwater. Eustachian tube problems often clear up on their own or after antibiotic treatment. If your tubes continue to be blocked, you may need surgery.   Follow-up care is a key part of your treatment and safety. Be sure to make and go to all appointments, and call your doctor if you are having problems. It's also a good idea to know your test results and keep a list of the medicines you take. How can you care for yourself at home? · To ease ear pain, apply a warm washcloth or a heating pad set on low. There may be some drainage from the ear when the heat melts earwax. Put a cloth between the heat source and your skin. Do not use a heating pad with children. · If your doctor prescribed antibiotics, take them as directed. Do not stop taking them just because you feel better. You need to take the full course of antibiotics. · Your doctor may recommend over-the-counter medicine. Be safe with medicines. Oral or nasal decongestants may relieve ear pain. Avoid decongestants that are combined with antihistamines, which tend to cause more blockage. But if allergies seem to be the problem, your doctor may recommend a combination. Be careful with cough and cold medicines. Don't give them to children younger than 6, because they don't work for children that age and can even be harmful. For children 6 and older, always follow all the instructions carefully. Make sure you know how much medicine to give and how long to use it. And use the dosing device if one is included. When should you call for help? Call your doctor now or seek immediate medical care if:  · You develop sudden, complete hearing loss. · You have severe pain or feel dizzy. · You have new or increasing pus or blood draining from your ear. · You have redness, swelling, or pain around or behind the ear. Watch closely for changes in your health, and be sure to contact your doctor if:  · You do not get better after 2 weeks. · You have any new symptoms, such as itching or a feeling of fullness in the ear. Where can you learn more? Go to http://marlo-estelita.info/.   Enter Y822 in the search box to learn more about \"Eustachian Tube Problems: Care Instructions. \"  Current as of: May 4, 2017  Content Version: 11.3  © 5837-4104 [employer]. Care instructions adapted under license by [employer] (which disclaims liability or warranty for this information). If you have questions about a medical condition or this instruction, always ask your healthcare professional. [address] any warranty or liability for your use of this information. Advised protocol for clearing congestion:  Increase fluid intake, especially water to thin mucous and boost the immune system. Avoid sugar and dairy while congested since they thicken mucous. Get plenty of rest!  Gargle 3 times daily and as needed in Listerine or warm salt water vinegar solutions (1 tsp salt, 1 tsp vinegar in 1 cup lukewarm water.)  Use OTC nasal saline spray up each nostril twice daily. Use humidifier at bedtime. Use OTC Mucinex 600 mg twice daily to loosen mucous. Use OTC Tylenol Arthritis or Ibuprofen up to 800 mg up to 3 times daily as needed for pain, fever or headaches. Avoid decongestants and Ibuprofen if you have high blood pressure! If mucous is consistently discolored yellow or green throughout the day for more than a week, call the doctor for an evaluation. Eustachian Tube Problems: Care Instructions  Your Care Instructions    The eustachian (say \"you-STAY-shee-un\") tubes run between the inside of the ears and the throat. They keep air pressure stable in the ears. If your eustachian tubes become blocked, the air pressure in your ears changes. The fluids from a cold can clog eustachian tubes, causing pain in the ears. A quick change in air pressure can cause eustachian tubes to close up. This might happen when an airplane changes altitude or when a  goes up or down underwater. Eustachian tube problems often clear up on their own or after antibiotic treatment.  If your tubes continue to be blocked, you may need surgery. Follow-up care is a key part of your treatment and safety. Be sure to make and go to all appointments, and call your doctor if you are having problems. It's also a good idea to know your test results and keep a list of the medicines you take. How can you care for yourself at home? · To ease ear pain, apply a warm washcloth or a heating pad set on low. There may be some drainage from the ear when the heat melts earwax. Put a cloth between the heat source and your skin. Do not use a heating pad with children. · If your doctor prescribed antibiotics, take them as directed. Do not stop taking them just because you feel better. You need to take the full course of antibiotics. · Your doctor may recommend over-the-counter medicine. Be safe with medicines. Oral or nasal decongestants may relieve ear pain. Avoid decongestants that are combined with antihistamines, which tend to cause more blockage. But if allergies seem to be the problem, your doctor may recommend a combination. Be careful with cough and cold medicines. Don't give them to children younger than 6, because they don't work for children that age and can even be harmful. For children 6 and older, always follow all the instructions carefully. Make sure you know how much medicine to give and how long to use it. And use the dosing device if one is included. When should you call for help? Call your doctor now or seek immediate medical care if:  · You develop sudden, complete hearing loss. · You have severe pain or feel dizzy. · You have new or increasing pus or blood draining from your ear. · You have redness, swelling, or pain around or behind the ear. Watch closely for changes in your health, and be sure to contact your doctor if:  · You do not get better after 2 weeks. · You have any new symptoms, such as itching or a feeling of fullness in the ear. Where can you learn more? Go to http://marlo-estelita.info/.   Enter Y822 in the search box to learn more about \"Eustachian Tube Problems: Care Instructions. \"  Current as of: May 4, 2017  Content Version: 11.3  © 3591-1315 [employer]. Care instructions adapted under license by [employer] (which disclaims liability or warranty for this information). If you have questions about a medical condition or this instruction, always ask your healthcare professional. [place] [address] any warranty or liability for your use of this information.

## 2017-10-17 NOTE — TELEPHONE ENCOUNTER
Future Appointments:  1/16/2018  11:00 AM   DO TONE Chaves      Last Appointment With Me:  Visit date not found      Last Appointment My Department:  6/28/2017

## 2017-10-24 RX ORDER — PANTOPRAZOLE SODIUM 40 MG/1
TABLET, DELAYED RELEASE ORAL
Qty: 90 TAB | Refills: 1 | Status: SHIPPED | OUTPATIENT
Start: 2017-10-24 | End: 2018-01-16 | Stop reason: SDUPTHER

## 2017-11-27 ENCOUNTER — TELEPHONE (OUTPATIENT)
Dept: FAMILY MEDICINE CLINIC | Age: 62
End: 2017-11-27

## 2017-11-27 RX ORDER — LISINOPRIL 20 MG/1
20 TABLET ORAL DAILY
Qty: 90 TAB | Refills: 3 | Status: SHIPPED | OUTPATIENT
Start: 2017-11-27 | End: 2018-12-11 | Stop reason: SDUPTHER

## 2017-11-27 NOTE — TELEPHONE ENCOUNTER
Received V.O from Dr. Gomez Francois to change Lisinopril to 90 day supply from 30 day supply. Order sent into pharmacy on file.

## 2017-12-01 DIAGNOSIS — Z12.39 BREAST CANCER SCREENING: ICD-10-CM

## 2018-01-10 ENCOUNTER — APPOINTMENT (OUTPATIENT)
Dept: GENERAL RADIOLOGY | Age: 63
End: 2018-01-10
Attending: EMERGENCY MEDICINE
Payer: COMMERCIAL

## 2018-01-10 ENCOUNTER — HOSPITAL ENCOUNTER (EMERGENCY)
Age: 63
Discharge: HOME | End: 2018-01-10
Attending: EMERGENCY MEDICINE
Payer: COMMERCIAL

## 2018-01-10 VITALS
SYSTOLIC BLOOD PRESSURE: 166 MMHG | OXYGEN SATURATION: 100 % | HEIGHT: 66 IN | TEMPERATURE: 98.2 F | DIASTOLIC BLOOD PRESSURE: 91 MMHG | BODY MASS INDEX: 16.94 KG/M2 | RESPIRATION RATE: 17 BRPM | WEIGHT: 105.38 LBS | HEART RATE: 66 BPM

## 2018-01-10 DIAGNOSIS — I10 ESSENTIAL HYPERTENSION: Primary | ICD-10-CM

## 2018-01-10 DIAGNOSIS — G44.89 OTHER HEADACHE SYNDROME: ICD-10-CM

## 2018-01-10 DIAGNOSIS — R07.9 ACUTE CHEST PAIN: ICD-10-CM

## 2018-01-10 LAB
ALBUMIN SERPL-MCNC: 4 G/DL (ref 3.5–5)
ALBUMIN/GLOB SERPL: 1.2 {RATIO} (ref 1.1–2.2)
ALP SERPL-CCNC: 80 U/L (ref 45–117)
ALT SERPL-CCNC: 26 U/L (ref 12–78)
ANION GAP SERPL CALC-SCNC: 6 MMOL/L (ref 5–15)
APPEARANCE UR: CLEAR
AST SERPL-CCNC: 14 U/L (ref 15–37)
BACTERIA URNS QL MICRO: NEGATIVE /HPF
BASOPHILS # BLD: 0 K/UL (ref 0–0.1)
BASOPHILS NFR BLD: 0 % (ref 0–1)
BILIRUB SERPL-MCNC: 0.5 MG/DL (ref 0.2–1)
BILIRUB UR QL: NEGATIVE
BUN SERPL-MCNC: 11 MG/DL (ref 6–20)
BUN/CREAT SERPL: 14 (ref 12–20)
CALCIUM SERPL-MCNC: 8.9 MG/DL (ref 8.5–10.1)
CHLORIDE SERPL-SCNC: 106 MMOL/L (ref 97–108)
CK SERPL-CCNC: 100 U/L (ref 26–192)
CO2 SERPL-SCNC: 30 MMOL/L (ref 21–32)
COLOR UR: ABNORMAL
CREAT SERPL-MCNC: 0.76 MG/DL (ref 0.55–1.02)
EOSINOPHIL # BLD: 0.1 K/UL (ref 0–0.4)
EOSINOPHIL NFR BLD: 1 % (ref 0–7)
EPITH CASTS URNS QL MICRO: ABNORMAL /LPF
ERYTHROCYTE [DISTWIDTH] IN BLOOD BY AUTOMATED COUNT: 12.4 % (ref 11.5–14.5)
GLOBULIN SER CALC-MCNC: 3.4 G/DL (ref 2–4)
GLUCOSE SERPL-MCNC: 84 MG/DL (ref 65–100)
GLUCOSE UR STRIP.AUTO-MCNC: NEGATIVE MG/DL
HCT VFR BLD AUTO: 37.3 % (ref 35–47)
HGB BLD-MCNC: 12.4 G/DL (ref 11.5–16)
HGB UR QL STRIP: NEGATIVE
KETONES UR QL STRIP.AUTO: NEGATIVE MG/DL
LEUKOCYTE ESTERASE UR QL STRIP.AUTO: ABNORMAL
LYMPHOCYTES # BLD: 1.8 K/UL (ref 0.8–3.5)
LYMPHOCYTES NFR BLD: 34 % (ref 12–49)
MCH RBC QN AUTO: 32.5 PG (ref 26–34)
MCHC RBC AUTO-ENTMCNC: 33.2 G/DL (ref 30–36.5)
MCV RBC AUTO: 97.9 FL (ref 80–99)
MONOCYTES # BLD: 0.5 K/UL (ref 0–1)
MONOCYTES NFR BLD: 9 % (ref 5–13)
NEUTS SEG # BLD: 2.9 K/UL (ref 1.8–8)
NEUTS SEG NFR BLD: 56 % (ref 32–75)
NITRITE UR QL STRIP.AUTO: NEGATIVE
PH UR STRIP: 6.5 [PH] (ref 5–8)
PLATELET # BLD AUTO: 256 K/UL (ref 150–400)
POTASSIUM SERPL-SCNC: 3.5 MMOL/L (ref 3.5–5.1)
PROT SERPL-MCNC: 7.4 G/DL (ref 6.4–8.2)
PROT UR STRIP-MCNC: NEGATIVE MG/DL
RBC # BLD AUTO: 3.81 M/UL (ref 3.8–5.2)
RBC #/AREA URNS HPF: ABNORMAL /HPF (ref 0–5)
SODIUM SERPL-SCNC: 142 MMOL/L (ref 136–145)
SP GR UR REFRACTOMETRY: 1.01 (ref 1–1.03)
TROPONIN I SERPL-MCNC: <0.04 NG/ML
UROBILINOGEN UR QL STRIP.AUTO: 1 EU/DL (ref 0.2–1)
WBC # BLD AUTO: 5.2 K/UL (ref 3.6–11)
WBC URNS QL MICRO: ABNORMAL /HPF (ref 0–4)

## 2018-01-10 PROCEDURE — 82550 ASSAY OF CK (CPK): CPT | Performed by: EMERGENCY MEDICINE

## 2018-01-10 PROCEDURE — 71045 X-RAY EXAM CHEST 1 VIEW: CPT

## 2018-01-10 PROCEDURE — 85025 COMPLETE CBC W/AUTO DIFF WBC: CPT | Performed by: EMERGENCY MEDICINE

## 2018-01-10 PROCEDURE — 36415 COLL VENOUS BLD VENIPUNCTURE: CPT | Performed by: EMERGENCY MEDICINE

## 2018-01-10 PROCEDURE — 84484 ASSAY OF TROPONIN QUANT: CPT | Performed by: EMERGENCY MEDICINE

## 2018-01-10 PROCEDURE — 80053 COMPREHEN METABOLIC PANEL: CPT | Performed by: EMERGENCY MEDICINE

## 2018-01-10 PROCEDURE — 99284 EMERGENCY DEPT VISIT MOD MDM: CPT

## 2018-01-10 PROCEDURE — 81001 URINALYSIS AUTO W/SCOPE: CPT | Performed by: EMERGENCY MEDICINE

## 2018-01-10 PROCEDURE — 93005 ELECTROCARDIOGRAM TRACING: CPT

## 2018-01-10 NOTE — ED PROVIDER NOTES
EMERGENCY DEPARTMENT HISTORY AND PHYSICAL EXAM      Date: 1/10/2018  Patient Name: Iman Restrepo    History of Presenting Illness     Chief Complaint   Patient presents with    Headache     x 2 days.  Chest Pain     R side chest pain since this morning    Shortness of Breath     SOB x today       History Provided By: Patient    HPI: Iman Restrepo, 58 y.o. female with PMHx significant for HTN, hyperlipidemia, presents ambulatory to the ED with cc of 2/10 HA and chest pressure ~0900 today. At time of examination, pt states chest pressure has resolved. She denies hx of CAD. Pt states she is scheduled to follow up with PCP regarding her blood pressure. She notes increased salt intake over the holidays. Pt denies head trauma. She notes she also felt mild tingling in her right hand this morning, following which she chose to report to ED. She specifically denies fever, dental issues, neck pain, and cough, diarrhea, dysuria, hematochezia. Pt is former smoker    PCP: Jh Allen DO    There are no other complaints, changes, or physical findings at this time. Current Outpatient Prescriptions   Medication Sig Dispense Refill    montelukast (SINGULAIR) 10 mg tablet TAKE 1 TABLET BY MOUTH EVERY DAY 90 Tab 3    lisinopril (PRINIVIL, ZESTRIL) 20 mg tablet Take 1 Tab by mouth daily. 90 Tab 3    pantoprazole (PROTONIX) 40 mg tablet TAKE 1 TABLET BY MOUTH EVERY DAY 90 Tab 1    pantoprazole (PROTONIX) 40 mg tablet Take 1 Tab by mouth daily. Indications: Heartburn 90 Tab 1    valACYclovir (VALTREX) 500 mg tablet TAKE 1 TABLET BY MOUTH EVERY DAY DURING FLARES 90 Tab 3    POTASSIUM PO Take  by mouth.  ALPRAZolam (XANAX) 0.25 mg tablet Take 1 Tab by mouth three (3) times daily as needed for Anxiety. Max Daily Amount: 0.75 mg. Indications: situational anxiety 15 Tab 1    aspirin delayed-release 81 mg tablet Take 1 Tab by mouth daily.  90 Tab 3    ergocalciferol (ERGOCALCIFEROL) 50,000 unit capsule Take 1 Cap by mouth every seven (7) days. 4 Cap 2    loratadine (CLARITIN) 10 mg tablet Take 1 Tab by mouth daily. 15 Tab 0    fluticasone (FLONASE) 50 mcg/actuation nasal spray 2 Sprays by Both Nostrils route daily. 1 Bottle 0    cetirizine (ZYRTEC) 10 mg tablet Take  by mouth.  simvastatin (ZOCOR) 40 mg tablet TAKE 1 TABLET BY MOUTH AT BEDTIME 90 tablet 3    cyanocobalamin (VITAMIN B-12) 1,000 mcg tablet Take 1,000 mcg by mouth daily.  ascorbic acid (VITAMIN C) 500 mg tablet Take  by mouth.  omega-3 fatty acids-vitamin e (FISH OIL) 1,000 mg Cap Take  by mouth daily.  MULTIVITAMINS (MULTIVITAMIN PO) Take  by mouth. Takes 3 times/week         Past History     Past Medical History:  Past Medical History:   Diagnosis Date    Allergic rhinitis, cause unspecified     Arthritis     fingers and toes    Breast mass 06/1999    Right. Benign. due to Prempro. Dr. Yeny Vargas    Chickenpox childhood    Dizziness 10/2011    Dr. Jarod Lees.  EBV positive mononucleosis syndrome 10/2010    positive titer.  Exposure to hepatitis B         Genital HSV     HA (headache)     Heart palpitations 05/21/09    Dr. Edmar Mesa    Hypoglycemia     Menopausal and postmenopausal disorder 1999    MRSA infection 12/2011    Right hip. Txd Bactrim.  Osteopenia     Dr. Heather Castellon. Dr. Alejandra Dsouza.  Other and unspecified hyperlipidemia     Raynaud phenomenon     RLS (restless legs syndrome) 2008    Shingles teenager    R arm    Vitamin D deficiency 2008       Past Surgical History:  Past Surgical History:   Procedure Laterality Date    COLONOSCOPY  09/19/2016    Dr. Aleksandra Mcgowan. due q 10 yrs.  HX BREAST LUMPECTOMY  1999    Benign. Dr. Kristin Vale HX ORTHOPAEDIC Right 10/2012    Right arm. BENIGN TUMOR REMOVED.   Dr. Esperanza Olivares       Family History:  Family History   Problem Relation Age of Onset    Other Mother      gallstones    Breast Cancer Mother 52     unilateral.    Cancer Mother 62     Breast Cancer    Hypertension Father     Stroke Father 70     x2 CVAs and several TIAs    Kidney Disease Father     Asthma Paternal Uncle      x 2    Stroke Paternal Aunt     Diabetes Paternal Aunt     Heart Attack Paternal Aunt     Other Paternal Aunt      gout    Diabetes Paternal Aunt     Cancer Maternal Grandmother      uterine    Colon Cancer Maternal Grandmother 79    Cancer Maternal Aunt      lung    Cancer Other      maternal cousins     Breast Cancer Other 28     x 2.  bilaterally.  Alzheimer Maternal Aunt      x 2       Social History:  Social History   Substance Use Topics    Smoking status: Former Smoker     Packs/day: 0.50     Years: 10.00     Types: Cigarettes     Quit date: 1/1/1985    Smokeless tobacco: Never Used    Alcohol use 1.5 oz/week     3 Standard drinks or equivalent per week      Comment: Occasional       Allergies: Allergies   Allergen Reactions    Prempro [Conj Estrog-Medroxyprogest Ace] Other (comments)     Benign Right breast mass. Review of Systems   Review of Systems   Constitutional: Negative for fever. HENT: Negative for dental problem. Respiratory: Negative for cough. Cardiovascular:        +chest pressure (resolved)   Gastrointestinal: Negative for blood in stool. Genitourinary: Negative for dysuria. Musculoskeletal: Negative for neck pain. Neurological: Positive for headaches. +tingling right hand (resolved)   All other systems reviewed and are negative. Physical Exam   Physical Exam     Vital signs and nursing notes reviewed    CONSTITUTIONAL: Alert, in mild distress; well-developed; thin build. HEAD:  Normocephalic, atraumatic  EYES: PERRL; EOM's intact. ENTM: Nose: no rhinorrhea; Throat: no erythema or exudate, mucous membranes moist  Neck:  Supple. trachea is midline. RESP: Chest clear, equal breath sounds. - W/R/R  CV: S1 and S2 WNL; No murmurs, gallops or rubs.  2+ radial and DP pulses bilaterally. GI: non-distended, normal bowel sounds, abdomen soft and non-tender. No masses or organomegaly. : No costo-vertebral angle tenderness. BACK:  Non-tender, normal appearance  UPPER EXT:  Normal inspection. no joint or soft tissue swelling  LOWER EXT: No edema, no calf tenderness. NEURO: Alert and oriented x3, 5/5 strength and light touch sensation intact in bilateral upper and lower extremities. SKIN: No rashes; Warm and dry  PSYCH: Normal mood, normal affect      Diagnostic Study Results     Labs -     Recent Results (from the past 12 hour(s))   EKG, 12 LEAD, INITIAL    Collection Time: 01/10/18 12:43 PM   Result Value Ref Range    Ventricular Rate 64 BPM    Atrial Rate 64 BPM    P-R Interval 170 ms    QRS Duration 78 ms    Q-T Interval 402 ms    QTC Calculation (Bezet) 414 ms    Calculated P Axis 77 degrees    Calculated R Axis 23 degrees    Calculated T Axis 63 degrees    Diagnosis       ** Poor data quality, interpretation may be adversely affected  Normal sinus rhythm with sinus arrhythmia  Normal ECG  No previous ECGs available     CBC WITH AUTOMATED DIFF    Collection Time: 01/10/18  1:14 PM   Result Value Ref Range    WBC 5.2 3.6 - 11.0 K/uL    RBC 3.81 3.80 - 5.20 M/uL    HGB 12.4 11.5 - 16.0 g/dL    HCT 37.3 35.0 - 47.0 %    MCV 97.9 80.0 - 99.0 FL    MCH 32.5 26.0 - 34.0 PG    MCHC 33.2 30.0 - 36.5 g/dL    RDW 12.4 11.5 - 14.5 %    PLATELET 958 391 - 205 K/uL    NEUTROPHILS 56 32 - 75 %    LYMPHOCYTES 34 12 - 49 %    MONOCYTES 9 5 - 13 %    EOSINOPHILS 1 0 - 7 %    BASOPHILS 0 0 - 1 %    ABS. NEUTROPHILS 2.9 1.8 - 8.0 K/UL    ABS. LYMPHOCYTES 1.8 0.8 - 3.5 K/UL    ABS. MONOCYTES 0.5 0.0 - 1.0 K/UL    ABS. EOSINOPHILS 0.1 0.0 - 0.4 K/UL    ABS.  BASOPHILS 0.0 0.0 - 0.1 K/UL   METABOLIC PANEL, COMPREHENSIVE    Collection Time: 01/10/18  1:14 PM   Result Value Ref Range    Sodium 142 136 - 145 mmol/L    Potassium 3.5 3.5 - 5.1 mmol/L    Chloride 106 97 - 108 mmol/L    CO2 30 21 - 32 mmol/L Anion gap 6 5 - 15 mmol/L    Glucose 84 65 - 100 mg/dL    BUN 11 6 - 20 MG/DL    Creatinine 0.76 0.55 - 1.02 MG/DL    BUN/Creatinine ratio 14 12 - 20      GFR est AA >60 >60 ml/min/1.73m2    GFR est non-AA >60 >60 ml/min/1.73m2    Calcium 8.9 8.5 - 10.1 MG/DL    Bilirubin, total 0.5 0.2 - 1.0 MG/DL    ALT (SGPT) 26 12 - 78 U/L    AST (SGOT) 14 (L) 15 - 37 U/L    Alk. phosphatase 80 45 - 117 U/L    Protein, total 7.4 6.4 - 8.2 g/dL    Albumin 4.0 3.5 - 5.0 g/dL    Globulin 3.4 2.0 - 4.0 g/dL    A-G Ratio 1.2 1.1 - 2.2     CK W/ REFLX CKMB    Collection Time: 01/10/18  1:14 PM   Result Value Ref Range     26 - 192 U/L   TROPONIN I    Collection Time: 01/10/18  1:14 PM   Result Value Ref Range    Troponin-I, Qt. <0.04 <0.05 ng/mL   URINALYSIS W/ RFLX MICROSCOPIC    Collection Time: 01/10/18  1:14 PM   Result Value Ref Range    Color YELLOW/STRAW      Appearance CLEAR CLEAR      Specific gravity 1.008 1.003 - 1.030      pH (UA) 6.5 5.0 - 8.0      Protein NEGATIVE  NEG mg/dL    Glucose NEGATIVE  NEG mg/dL    Ketone NEGATIVE  NEG mg/dL    Bilirubin NEGATIVE  NEG      Blood NEGATIVE  NEG      Urobilinogen 1.0 0.2 - 1.0 EU/dL    Nitrites NEGATIVE  NEG      Leukocyte Esterase SMALL (A) NEG      WBC 0-4 0 - 4 /hpf    RBC 0-5 0 - 5 /hpf    Epithelial cells FEW FEW /lpf    Bacteria NEGATIVE  NEG /hpf       Radiologic Studies -     CXR Results  (Last 48 hours)               01/10/18 1417  XR CHEST PORT Final result    Impression:  IMPRESSION: No acute abnormality. Narrative:  EXAM:  XR CHEST PORT. INDICATION: Chest pain. COMPARISON: 1/25/2007. FINDINGS:    A portable AP radiograph of the chest was obtained at 1359 hours. Lines and tubes: The patient is on a cardiac monitor. Lungs: The lungs are clear. Pleura: There is no pneumothorax or pleural effusion. Mediastinum: The cardiac and mediastinal contours and pulmonary vascularity are   normal.   Bones and soft tissues:  The bones and soft tissues are grossly within normal   limits. Medical Decision Making   I am the first provider for this patient. I reviewed the vital signs, available nursing notes, past medical history, past surgical history, family history and social history. Vital Signs-Reviewed the patient's vital signs. Patient Vitals for the past 12 hrs:   Temp Pulse Resp BP SpO2   01/10/18 1239 98.2 °F (36.8 °C) 79 16 (!) 166/93 100 %       Pulse Oximetry Analysis - 100% on RA    Cardiac Monitor:   Rate: 64 bpm  Rhythm: Normal Sinus Rhythm      EKG interpretation: 12:43  Rhythm: normal sinus rhythm; and regular . Rate (approx.): 64; Axis: normal; MD interval: normal; QRS interval: normal ; ST/T wave: normal; Other findings: no acute ischemic changes. Records Reviewed: Nursing Notes and Old Medical Records    Provider Notes (Medical Decision Making):     59 yo Female with ACS risk factors and elevated blood pressure, EKG reassuring, but will do troponin x2. Pt has PMD follow up. Not consistent with stroke or SAH, as HA is not worst of her life or acute sudden thunderclap onset. Pt's symptoms also not consistent with PTX, aortic dissection, or other acute thoracic emergency. ED Course:   Initial assessment performed. The patients presenting problems have been discussed, and they are in agreement with the care plan formulated and outlined with them. I have encouraged them to ask questions as they arise throughout their visit. SIGN OUT:  2:08 PM  Patient's presentation, labs/imaging and plan of care was reviewed with Jarrod Marrufo MD as part of sign out. They will assume care as part of the plan discussed with the patient. Jarrod Marrufo MD's assistance in completion of this plan is greatly appreciated but it should be noted that I will be the provider of record for this patient.     Ryland Lutz MD      Critical Care Time:   0 minutes    Disposition:    DISCHARGE NOTE:  3:36 PM  The patient's results have been reviewed with family and/or caregiver. They verbally convey their understanding and agreement of the patient's signs, symptoms, diagnosis, treatment, and prognosis. They additionally agree to follow up as recommended in the discharge instructions or to return to the Emergency Room should the patient's condition change prior to their follow-up appointment. The family and/or caregiver verbally agrees with the care-plan and all of their questions have been answered. The discharge instructions have also been provided to the them along with educational information regarding the patient's diagnosis and a list of reasons why the patient would want to return to the ER prior to their follow-up appointment should their condition change. Written by Mihir Gomez. Anuja Mcneill ED Scribe, as dictated by [employer] Al Urbina MD.    PLAN:  1. Current Discharge Medication List        2. Follow-up Information     Follow up With Details Comments [facility], DO In 2 days for blood pressure re-check  [address] [place] [address]  [phone]      [facility] EMERGENCY DEPT  If symptoms worsen including new chest pain diffuculty breathing or other new concerning symptoms. [address] [place], [place] [phone]        Return to ED if worse     Diagnosis     Clinical Impression:   1. Essential hypertension    2. Other headache syndrome    3. Acute chest pain        Attestations: This note is prepared by Araceli Cruz, acting as Scribe for Edith Tai MD.    Edith Tai MD: The scribe's documentation has been prepared under my direction and personally reviewed by me in its entirety. I confirm that the note above accurately reflects all work, treatment, procedures, and medical decision making performed by me.

## 2018-01-10 NOTE — DISCHARGE INSTRUCTIONS
Headache: Care Instructions  Your Care Instructions    Headaches have many possible causes. Most headaches aren't a sign of a more serious problem, and they will get better on their own. Home treatment may help you feel better faster. The doctor has checked you carefully, but problems can develop later. If you notice any problems or new symptoms, get medical treatment right away. Follow-up care is a key part of your treatment and safety. Be sure to make and go to all appointments, and call your doctor if you are having problems. It's also a good idea to know your test results and keep a list of the medicines you take. How can you care for yourself at home? · Do not drive if you have taken a prescription pain medicine. · Rest in a quiet, dark room until your headache is gone. Close your eyes and try to relax or go to sleep. Don't watch TV or read. · Put a cold, moist cloth or cold pack on the painful area for 10 to 20 minutes at a time. Put a thin cloth between the cold pack and your skin. · Use a warm, moist towel or a heating pad set on low to relax tight shoulder and neck muscles. · Have someone gently massage your neck and shoulders. · Take pain medicines exactly as directed. ¨ If the doctor gave you a prescription medicine for pain, take it as prescribed. ¨ If you are not taking a prescription pain medicine, ask your doctor if you can take an over-the-counter medicine. · Be careful not to take pain medicine more often than the instructions allow, because you may get worse or more frequent headaches when the medicine wears off. · Do not ignore new symptoms that occur with a headache, such as a fever, weakness or numbness, vision changes, or confusion. These may be signs of a more serious problem. To prevent headaches  · Keep a headache diary so you can figure out what triggers your headaches. Avoiding triggers may help you prevent headaches.  Record when each headache began, how long it lasted, and what the pain was like (throbbing, aching, stabbing, or dull). Write down any other symptoms you had with the headache, such as nausea, flashing lights or dark spots, or sensitivity to bright light or loud noise. Note if the headache occurred near your period. List anything that might have triggered the headache, such as certain foods (chocolate, cheese, wine) or odors, smoke, bright light, stress, or lack of sleep. · Find healthy ways to deal with stress. Headaches are most common during or right after stressful times. Take time to relax before and after you do something that has caused a headache in the past.  · Try to keep your muscles relaxed by keeping good posture. Check your jaw, face, neck, and shoulder muscles for tension, and try relaxing them. When sitting at a desk, change positions often, and stretch for 30 seconds each hour. · Get plenty of sleep and exercise. · Eat regularly and well. Long periods without food can trigger a headache. · Treat yourself to a massage. Some people find that regular massages are very helpful in relieving tension. · Limit caffeine by not drinking too much coffee, tea, or soda. But don't quit caffeine suddenly, because that can also give you headaches. · Reduce eyestrain from computers by blinking frequently and looking away from the computer screen every so often. Make sure you have proper eyewear and that your monitor is set up properly, about an arm's length away. · Seek help if you have depression or anxiety. Your headaches may be linked to these conditions. Treatment can both prevent headaches and help with symptoms of anxiety or depression. When should you call for help? Call 911 anytime you think you may need emergency care. For example, call if:  ? · You have signs of a stroke. These may include:  ¨ Sudden numbness, paralysis, or weakness in your face, arm, or leg, especially on only one side of your body. ¨ Sudden vision changes.   ¨ Sudden trouble speaking. ¨ Sudden confusion or trouble understanding simple statements. ¨ Sudden problems with walking or balance. ¨ A sudden, severe headache that is different from past headaches. ?Call your doctor now or seek immediate medical care if:  ? · You have a new or worse headache. ? · Your headache gets much worse. Where can you learn more? Go to http://marlo-estelita.info/. Enter M271 in the search box to learn more about \"Headache: Care Instructions. \"  Current as of: October 14, 2016  Content Version: 11.4  © 4810-2779 [employer]. Care instructions adapted under license by [employer] (which disclaims liability or warranty for this information). If you have questions about a medical condition or this instruction, always ask your healthcare professional. [address] any warranty or liability for your use of this information. Chest Pain: Care Instructions  Your Care Instructions    There are many things that can cause chest pain. Some are not serious and will get better on their own in a few days. But some kinds of chest pain need more testing and treatment. Your doctor may have recommended a follow-up visit in the next 8 to 12 hours. If you are not getting better, you may need more tests or treatment. Even though your doctor has released you, you still need to watch for any problems. The doctor carefully checked you, but sometimes problems can develop later. If you have new symptoms or if your symptoms do not get better, get medical care right away. If you have worse or different chest pain or pressure that lasts more than 5 minutes or you passed out (lost consciousness), call 911 or seek other emergency help right away. A medical visit is only one step in your treatment.  Even if you feel better, you still need to do what your doctor recommends, such as going to all suggested follow-up appointments and taking medicines exactly as directed. This will help you recover and help prevent future problems. How can you care for yourself at home? · Rest until you feel better. · Take your medicine exactly as prescribed. Call your doctor if you think you are having a problem with your medicine. · Do not drive after taking a prescription pain medicine. When should you call for help? Call 911 if:  ? · You passed out (lost consciousness). ? · You have severe difficulty breathing. ? · You have symptoms of a heart attack. These may include:  ¨ Chest pain or pressure, or a strange feeling in your chest.  ¨ Sweating. ¨ Shortness of breath. ¨ Nausea or vomiting. ¨ Pain, pressure, or a strange feeling in your back, neck, jaw, or upper belly or in one or both shoulders or arms. ¨ Lightheadedness or sudden weakness. ¨ A fast or irregular heartbeat. After you call 911, the  may tell you to chew 1 adult-strength or 2 to 4 low-dose aspirin. Wait for an ambulance. Do not try to drive yourself. ?Call your doctor today if:  ? · You have any trouble breathing. ? · Your chest pain gets worse. ? · You are dizzy or lightheaded, or you feel like you may faint. ? · You are not getting better as expected. ? · You are having new or different chest pain. Where can you learn more? Go to http://marlo-estelita.info/. Enter A120 in the search box to learn more about \"Chest Pain: Care Instructions. \"  Current as of: March 20, 2017  Content Version: 11.4  © 0786-3061 [employer]. Care instructions adapted under license by [employer] (which disclaims liability or warranty for this information). If you have questions about a medical condition or this instruction, always ask your healthcare professional. [address] any warranty or liability for your use of this information.

## 2018-01-10 NOTE — ED NOTES
Jarrod Da Silva MD reviewed discharge instructions with the patient. The patient verbalized understanding. Ambulatory on discharge.

## 2018-01-12 LAB
ATRIAL RATE: 64 BPM
CALCULATED P AXIS, ECG09: 77 DEGREES
CALCULATED R AXIS, ECG10: 23 DEGREES
CALCULATED T AXIS, ECG11: 63 DEGREES
DIAGNOSIS, 93000: NORMAL
P-R INTERVAL, ECG05: 170 MS
Q-T INTERVAL, ECG07: 402 MS
QRS DURATION, ECG06: 78 MS
QTC CALCULATION (BEZET), ECG08: 414 MS
VENTRICULAR RATE, ECG03: 64 BPM

## 2018-01-16 ENCOUNTER — OFFICE VISIT (OUTPATIENT)
Dept: FAMILY MEDICINE CLINIC | Age: 63
End: 2018-01-16

## 2018-01-16 ENCOUNTER — TELEPHONE (OUTPATIENT)
Dept: FAMILY MEDICINE CLINIC | Age: 63
End: 2018-01-16

## 2018-01-16 VITALS
SYSTOLIC BLOOD PRESSURE: 141 MMHG | HEIGHT: 66 IN | DIASTOLIC BLOOD PRESSURE: 83 MMHG | BODY MASS INDEX: 16.54 KG/M2 | RESPIRATION RATE: 16 BRPM | HEART RATE: 55 BPM | TEMPERATURE: 98.1 F | OXYGEN SATURATION: 100 % | WEIGHT: 102.9 LBS

## 2018-01-16 DIAGNOSIS — R07.89 OTHER CHEST PAIN: ICD-10-CM

## 2018-01-16 DIAGNOSIS — I73.00 RAYNAUD'S PHENOMENON WITHOUT GANGRENE: ICD-10-CM

## 2018-01-16 DIAGNOSIS — J30.2 CHRONIC SEASONAL ALLERGIC RHINITIS DUE TO OTHER ALLERGEN: ICD-10-CM

## 2018-01-16 DIAGNOSIS — Z82.49 FAMILY HISTORY OF HEART ATTACK: ICD-10-CM

## 2018-01-16 DIAGNOSIS — I10 ESSENTIAL HYPERTENSION WITH GOAL BLOOD PRESSURE LESS THAN 140/90: ICD-10-CM

## 2018-01-16 DIAGNOSIS — G44.039 EPISODIC PAROXYSMAL HEMICRANIA, NOT INTRACTABLE: ICD-10-CM

## 2018-01-16 DIAGNOSIS — E78.5 DYSLIPIDEMIA: ICD-10-CM

## 2018-01-16 DIAGNOSIS — E55.9 VITAMIN D DEFICIENCY: ICD-10-CM

## 2018-01-16 DIAGNOSIS — R06.02 SOB (SHORTNESS OF BREATH): ICD-10-CM

## 2018-01-16 DIAGNOSIS — Z82.3 FAMILY HISTORY OF STROKE: ICD-10-CM

## 2018-01-16 DIAGNOSIS — R00.2 HEART PALPITATIONS: Primary | ICD-10-CM

## 2018-01-16 DIAGNOSIS — M25.511 ACUTE PAIN OF RIGHT SHOULDER: ICD-10-CM

## 2018-01-16 DIAGNOSIS — Z80.3 FAMILY HISTORY OF BREAST CANCER: ICD-10-CM

## 2018-01-16 RX ORDER — ACETAMINOPHEN 500 MG
TABLET ORAL
Qty: 1 KIT | Refills: 0 | Status: SHIPPED | OUTPATIENT
Start: 2018-01-16

## 2018-01-16 RX ORDER — PANTOPRAZOLE SODIUM 40 MG/1
40 TABLET, DELAYED RELEASE ORAL DAILY
Qty: 90 TAB | Refills: 1 | Status: SHIPPED | OUTPATIENT
Start: 2018-01-16 | End: 2018-07-20 | Stop reason: SDUPTHER

## 2018-01-16 NOTE — PROGRESS NOTES
HISTORY OF PRESENT ILLNESS  Tracey Sprague is a 58 y.o. female presents with Hypertension (1 mo f/u); ED Follow-up [facility], [facility] ER F/U for SOB); Chest Pain; Headache; and Medication Refill    Agree with nurse note. She went to the ER on 01/10/18 for SOB, chest pain, and headache. That morning she had a nose bleed. She also had some nasal congestion. /93. CXR negative. EKG NSR. Labs unremarkable. Dx'd HTN, other headache syndrome, and acute chest pain. She reports the ER told her not to return to work until she saw me so she is requesting a return to work note. She has had discomfort in her epigastric region that travels upward into her chest. She has several possible food triggers. She stopped Protonix 3 months ago. Denies nausea, vomiting, or other associated sxs. She has had the headaches off and on. She takes OTC pain med prn with relief. Her eye glasses were changed after last eye exam in 10/2017. Denies blurry vision, double vision, or other associated sxs. Hypertensive pt with dyslipidemia, heart palpitations, vit d deficiency, and family hx of stroke and heart attack presents to the office with a BP of 141/83 and HR of 55 bpm. For BP, she takes Lisinopril 20 mg daily. Requests Rx for BP cuff since her insurance will cover it that way. She has had heart racing off and on causing her to feel like she takes shallow breaths. Most recent episode was today while in our office and acknowledges she is anxious about a friend who recently had a stroke. She also has been drinking more hot green tea since it has been colder. Denies diaphoresis, sudden weakness, or other associated sxs. She has a hx of allergies but has not been taking Singulair, Claritin, or Flonase. She does have nasal congestion with clear mucus. She has R shoulder pain off and on. She wonders if the benign tumor is returning that was removed in 10/2012.  She also typically wears her purse on her R shoulder and estimates her purse weigh to be 20 lbs. Health Maintenance    Pt with family hx of breast ca. Mammogram was normal in 10/2017 at [facility]. Written by shawna Brody, as dictated by Dr. Debbi Ramsey DO.    ROS    Review of Systems negative except as noted above in HPI. ALLERGIES:    Allergies   Allergen Reactions    Prempro [Conj Estrog-Medroxyprogest Ace] Other (comments)     Benign Right breast mass. CURRENT MEDICATIONS:    Outpatient Prescriptions Marked as Taking for the 1/16/18 encounter (Office Visit) with Marline Russo DO   Medication Sig Dispense Refill    pantoprazole (PROTONIX) 40 mg tablet Take 1 Tab by mouth daily. Indications: Heartburn 90 Tab 1    Blood Pressure Test Kit-Wrist kit Check bp daily and as needed Dx:  Hypertension I10  Indications: hypertension 1 Kit 0    lisinopril (PRINIVIL, ZESTRIL) 20 mg tablet Take 1 Tab by mouth daily. 90 Tab 3    valACYclovir (VALTREX) 500 mg tablet TAKE 1 TABLET BY MOUTH EVERY DAY DURING FLARES 90 Tab 3    aspirin delayed-release 81 mg tablet Take 1 Tab by mouth daily. 90 Tab 3    simvastatin (ZOCOR) 40 mg tablet TAKE 1 TABLET BY MOUTH AT BEDTIME 90 tablet 3    omega-3 fatty acids-vitamin e (FISH OIL) 1,000 mg Cap Take  by mouth daily.  MULTIVITAMINS (MULTIVITAMIN PO) Take  by mouth. Takes 3 times/week         PAST MEDICAL HISTORY:    Past Medical History:   Diagnosis Date    Allergic rhinitis, cause unspecified     Arthritis     fingers and toes    Breast mass 06/1999    Right. Benign. due to Prempro. Dr. Inez Elizondo    Chickenpox childhood    Dizziness 10/2011    Dr. Homar Castanon.  EBV positive mononucleosis syndrome 10/2010    positive titer.  Exposure to hepatitis B         Genital HSV     HA (headache)     Heart palpitations 05/21/09    Dr. Fahad Del Toro    Hypoglycemia     Menopausal and postmenopausal disorder 1999    MRSA infection 12/2011    Right hip. Txd Bactrim.     Osteopenia      Brodie Loonye. Dr. Carina Jha.  Other and unspecified hyperlipidemia     Raynaud phenomenon     RLS (restless legs syndrome) 2008    Shingles teenager    R arm    Vitamin D deficiency 2008       PAST SURGICAL HISTORY:    Past Surgical History:   Procedure Laterality Date    COLONOSCOPY  09/19/2016    Dr. Yoni Barnett. due q 10 yrs.  HX BREAST LUMPECTOMY  1999    Benign. Dr. Froilan aFust HX ORTHOPAEDIC Right 10/2012    Right arm. BENIGN TUMOR REMOVED. Dr. Wendy Gonzalester:    Family History   Problem Relation Age of Onset    Other Mother      gallstones    Breast Cancer Mother 52     unilateral.   [address] Mother 62     Breast Cancer    Hypertension Father     Stroke Father 70     x2 CVAs and several TIAs    Kidney Disease Father     Asthma Paternal Uncle      x 2    Stroke Paternal Aunt     Diabetes Paternal Aunt     Heart Attack Paternal Aunt     Other Paternal Aunt      gout    Diabetes Paternal Aunt     Cancer Maternal Grandmother      uterine    Colon Cancer Maternal Grandmother 79    Cancer Maternal Aunt      lung    Cancer Other      maternal cousins     Breast Cancer Other 28     x 2.  bilaterally.     Alzheimer Maternal Aunt      x 2       SOCIAL HISTORY:    Social History     Social History    Marital status:      Spouse name: N/A    Number of children: N/A    Years of education: N/A     Social History Main Topics    Smoking status: Former Smoker     Packs/day: 0.50     Years: 10.00     Types: Cigarettes     Quit date: 1/1/1985    Smokeless tobacco: Never Used    Alcohol use 1.5 oz/week     3 Standard drinks or equivalent per week      Comment: Occasional    Drug use: No    Sexual activity: Yes     Partners: Male     Birth control/ protection: None     Other Topics Concern    None     Social History Narrative       IMMUNIZATIONS:    Immunization History   Administered Date(s) Administered    Influenza Vaccine 10/01/2014, 10/01/2016, 10/06/2017    Influenza Vaccine Split 10/18/2010, 10/21/2011    TD Vaccine 08/31/2004    Tdap 03/28/2017         PHYSICAL EXAMINATION    Vital Signs    Visit Vitals    /83 (BP 1 Location: Left arm, BP Patient Position: Sitting)    Pulse (!) 55    Temp 98.1 °F (36.7 °C) (Oral)    Resp 16    Ht 5' 6\" (1.676 m)    Wt 102 lb 14.4 oz (46.7 kg)    SpO2 100%    BMI 16.61 kg/m2       Weight Metrics 1/16/2018 1/10/2018 10/16/2017 6/28/2017 3/28/2017 2/18/2017 9/26/2016   Weight 102 lb 14.4 oz 105 lb 6.1 oz 103 lb 6.4 oz 105 lb 106 lb 11.2 oz 107 lb 110 lb 6.4 oz   BMI 16.61 kg/m2 17.01 kg/m2 16.7 kg/m2 16.96 kg/m2 17.22 kg/m2 17.27 kg/m2 17.82 kg/m2       General appearance - Well nourished. Well appearing. Well developed. No acute distress. Underweight. Head - Normocephalic. Atraumatic. Eyes - pupils equal and reactive. Extraocular eye movements intact. Sclera anicteric. Mildly injected sclera. Ears - Hearing is grossly normal bilaterally. Nose - normal and patent. No polyps noted. No erythema. No discharge. Mouth - mucous membranes with adequate moisture. Posterior pharynx normal with cobblestone appearance. No erythema, white exudate or obstruction. Neck - supple. Midline trachea. No carotid bruits noted bilaterally. No thyromegaly noted. Chest - clear to auscultation bilaterally anteriorly and posteriorly. No wheezes. No rales or rhonchi. Breath sounds are symmetrical bilaterally. Unlabored respirations. Heart - normal rate. Regular rhythm. Normal S1, S2. No murmur noted. No rubs, clicks or gallops noted. Abdomen - soft and nondistended. No masses or organomegaly. No rebound, rigidity or guarding. Bowel sounds normal x 4 quadrants. No tenderness noted. Neurological - awake, alert and oriented to person, place, and time and event. Cranial nerves II through XII intact. Clear speech. Muscle strength is +5/5 x 4 extremities. Sensation is intact to light touch bilaterally. Steady gait. Heme/Lymph - peripheral pulses normal x 4 extremities. No peripheral edema is noted. +2/4 temporal artery pulses BL. Non-tender. Musculoskeletal - Intact x 4 extremities. Full ROM x 4 extremities. No pain with movement. Back exam - normal range of motion. No pain on palpation of the spinous processes in the cervical, thoracic, lumbar, sacral regions. No CVA tenderness. Skin - no rashes, erythema, ecchymosis, lacerations, abrasions, suspicious moles noted  Psychological -   normal behavior, dress and thought processes. Good insight. Good eye contact. Normal affect. Appropriate mood. Normal speech. DATA REVIEWED    Lab Results   Component Value Date/Time    WBC 5.2 01/10/2018 01:14 PM    WBC 3.8 05/29/2012 09:00 AM    HGB 12.4 01/10/2018 01:14 PM    HCT 37.3 01/10/2018 01:14 PM    PLATELET 623 08/07/4388 01:14 PM    MCV 97.9 01/10/2018 01:14 PM     Lab Results   Component Value Date/Time    Sodium 142 01/10/2018 01:14 PM    Potassium 3.5 01/10/2018 01:14 PM    Chloride 106 01/10/2018 01:14 PM    CO2 30 01/10/2018 01:14 PM    Anion gap 6 01/10/2018 01:14 PM    Glucose 84 01/10/2018 01:14 PM    BUN 11 01/10/2018 01:14 PM    Creatinine 0.76 01/10/2018 01:14 PM    BUN/Creatinine ratio 14 01/10/2018 01:14 PM    GFR est AA >60 01/10/2018 01:14 PM    GFR est non-AA >60 01/10/2018 01:14 PM    Calcium 8.9 01/10/2018 01:14 PM    Bilirubin, total 0.5 01/10/2018 01:14 PM    AST (SGOT) 14 01/10/2018 01:14 PM    Alk.  phosphatase 80 01/10/2018 01:14 PM    Protein, total 7.4 01/10/2018 01:14 PM    Albumin 4.0 01/10/2018 01:14 PM    Globulin 3.4 01/10/2018 01:14 PM    A-G Ratio 1.2 01/10/2018 01:14 PM    ALT (SGPT) 26 01/10/2018 01:14 PM     Lab Results   Component Value Date/Time    Cholesterol, total 177 03/21/2017 08:11 AM    HDL Cholesterol 71 03/21/2017 08:11 AM    LDL, calculated 94 03/21/2017 08:11 AM    VLDL, calculated 12 03/21/2017 08:11 AM    Triglyceride 58 03/21/2017 08:11 AM CHOL/HDL Ratio 3.2 10/18/2010 11:28 AM     Lab Results   Component Value Date/Time    Vitamin D 25-Hydroxy 15.1 04/11/2011 09:00 AM    VITAMIN D, 25-HYDROXY 26.2 03/21/2017 08:11 AM       Lab Results   Component Value Date/Time    Hemoglobin A1c 5.1 03/21/2017 08:11 AM     Lab Results   Component Value Date/Time    TSH 1.480 03/21/2017 08:11 AM     Lab Results   Component Value Date/Time    Microalb/Creat ratio (ug/mg creat.) <5.4 03/22/2017 10:34 AM       ASSESSMENT and PLAN      ICD-10-CM ICD-9-CM    1. Heart palpitations R00.2 785.1 REFERRAL TO CARDIOLOGY    due to increased green tea/caffeine vs other   2. Dyslipidemia E78.5 272.4    3. Essential hypertension with goal blood pressure less than 140/90 I10 401.9 Blood Pressure Test Kit-Wrist kit   4. Vitamin D deficiency E55.9 268.9    5. Episodic paroxysmal hemicrania, not intractable G44.039 339.03 REFERRAL TO CARDIOLOGY    due to bp vs sinus pressure vs tension vs new eyeglass Rx vs other   6. Other chest pain R07.89 786.59 pantoprazole (PROTONIX) 40 mg tablet      REFERRAL TO CARDIOLOGY    due to GI vs cardio vs other   7. Raynaud's phenomenon without gangrene I73.00 443.0     intermittent   8. SOB (shortness of breath) R06.02 786.05 REFERRAL TO CARDIOLOGY    and BUCKNER with heart racing, recurrent   9. Chronic seasonal allergic rhinitis due to other allergen J30.2 477.8    10. Family history of stroke Z82.3 V17.1 REFERRAL TO CARDIOLOGY   11. Family history of heart attack Z82.49 V17.3 REFERRAL TO CARDIOLOGY   12. Family history of breast cancer Z80.3 V16.3    13. Acute pain of right shoulder M25.511 719.41     due to recurrent lipoma vs other, intermittently       Discussed the patient's BMI with her. The BMI follow up plan is as follows: I have counseled this patient on diet and exercise regimens.   Decrease carbohydrates (white foods, sweet foods, sweet drinks and alcohol), increase green leafy vegetables and protein (lean meats and beans) with each meal. Avoid fried foods. Eat 3-5 small meals daily. Do not skip meals. Increase water intake. Avoid caffeine. Increase physical activity to 30 minutes daily for health benefit or 60 minutes daily to prevent weight regain, as tolerated. Get 7-8 hours uninterrupted sleep nightly. Chart reviewed and updated. Continue current medications and care. Take Zantac prn and ok to restart Protonix prn if Zantac does not provide relief. Restart Singulair, Claritin, and Flonase. Alternate shoulders for purse. Prescriptions written and sent to pharmacy; medication side effects discussed. Protonix 40 mg. BP test kit. Most recent tests reviewed from ER. Recheck pertinent labs when fasting in 03/2018. Recent office visit notes from ER reviewed. Referrals given; patient urged to keep appointments with specialists. Cardio. Counseled patient on health concerns:  Heartburn, BP, heart palpitations, headaches. Relevant handouts given and discussed with patient. Immunizations noted. Offered empathy, support, legitimation, prayers, partnership to patient. Praised patient for progress. Work note given. Follow-up Disposition:  Return in about 3 months (around 4/16/2018) for bp, referral follow up. Patient was offered a choice/choices in the treatment plan today. Patient expresses understanding of the plan and agrees with recommendations. Written by shawna Kelly, as dictated by Dr. Savanna Noonan DO. Documentation True and Accepted by Becky Wilks. Sherlean Cowden. Patient Instructions          Indigestion (Dyspepsia or Heartburn): Care Instructions  Your Care Instructions  Sometimes it can be hard to pinpoint the cause of indigestion. (It is also called dyspepsia or heartburn.) Most cases of an upset stomach with bloating, burning, burping, and nausea are minor and go away within several hours. Home treatment and over-the-counter medicine often are able to control symptoms.  But if you take medicine to relieve your indigestion without making diet and lifestyle changes, your symptoms are likely to return again and again. If you get indigestion often, it may be a sign of a more serious medical problem. Be sure to follow up with your doctor, who may want to do tests to be sure of the cause of your indigestion. Follow-up care is a key part of your treatment and safety. Be sure to make and go to all appointments, and call your doctor if you are having problems. It's also a good idea to know your test results and keep a list of the medicines you take. How can you care for yourself at home? · Your doctor may recommend over-the-counter medicine. For mild or occasional indigestion, antacids such as Gaviscon, Mylanta, Maalox, or Tums, may help. Be safe with medicines. Be careful when you take over-the-counter antacid medicines. Many of these medicines have aspirin in them. Read the label to make sure that you are not taking more than the recommended dose. Too much aspirin can be harmful. · Your doctor also may recommend over-the-counter acid reducers, such as Pepcid AC, Tagamet HB, Zantac 75, or Prilosec. Read and follow all instructions on the label. If you use these medicines often, talk with your doctor. · Change your eating habits. ¨ It's best to eat several small meals instead of two or three large meals. ¨ After you eat, wait 2 to 3 hours before you lie down. ¨ Chocolate, mint, and alcohol can make GERD worse. ¨ Spicy foods, foods that have a lot of acid (like tomatoes and oranges), and coffee can make GERD symptoms worse in some people. If your symptoms are worse after you eat a certain food, you may want to stop eating that food to see if your symptoms get better. · Do not smoke or chew tobacco. Smoking can make GERD worse. If you need help quitting, talk to your doctor about stop-smoking programs and medicines. These can increase your chances of quitting for good.   · If you have GERD symptoms at night, raise the head of your bed 6 to 8 inches. You can do this by putting the frame on blocks or placing a foam wedge under the head of your mattress. (Adding extra pillows does not work.)  · Do not wear tight clothing around your middle. · Lose weight if you need to. Losing just 5 to 10 pounds can help. · Do not take anti-inflammatory medicines, such as aspirin, ibuprofen (Advil, Motrin), or naproxen (Aleve). These can irritate the stomach. If you need a pain medicine, try acetaminophen (Tylenol), which does not cause stomach upset. When should you call for help? Call your doctor now or seek immediate medical care if:  ? · You have new or worse belly pain. ? · You are vomiting. ? Watch closely for changes in your health, and be sure to contact your doctor if:  ? · You have new or worse symptoms of indigestion. ? · You have trouble or pain swallowing. ? · You are losing weight. ? · You do not get better as expected. Where can you learn more? Go to http://marlo-estelita.info/. Enter T688 in the search box to learn more about \"Indigestion (Dyspepsia or Heartburn): Care Instructions. \"  Current as of: May 12, 2017  Content Version: 11.4  © 6233-1688 [employer]. Care instructions adapted under license by [employer] (which disclaims liability or warranty for this information). If you have questions about a medical condition or this instruction, always ask your healthcare professional. [place] [address] any warranty or liability for your use of this information. DASH Diet: Care Instructions  Your Care Instructions    The DASH diet is an eating plan that can help lower your blood pressure. DASH stands for Dietary Approaches to Stop Hypertension. Hypertension is high blood pressure. The DASH diet focuses on eating foods that are high in calcium, potassium, and magnesium. These nutrients can lower blood pressure.  The foods that are highest in these nutrients are fruits, vegetables, low-fat dairy products, nuts, seeds, and legumes. But taking calcium, potassium, and magnesium supplements instead of eating foods that are high in those nutrients does not have the same effect. The DASH diet also includes whole grains, fish, and poultry. The DASH diet is one of several lifestyle changes your doctor may recommend to lower your high blood pressure. Your doctor may also want you to decrease the amount of sodium in your diet. Lowering sodium while following the DASH diet can lower blood pressure even further than just the DASH diet alone. Follow-up care is a key part of your treatment and safety. Be sure to make and go to all appointments, and call your doctor if you are having problems. It's also a good idea to know your test results and keep a list of the medicines you take. How can you care for yourself at home? Following the DASH diet  · Eat 4 to 5 servings of fruit each day. A serving is 1 medium-sized piece of fruit, ½ cup chopped or canned fruit, 1/4 cup dried fruit, or 4 ounces (½ cup) of fruit juice. Choose fruit more often than fruit juice. · Eat 4 to 5 servings of vegetables each day. A serving is 1 cup of lettuce or raw leafy vegetables, ½ cup of chopped or cooked vegetables, or 4 ounces (½ cup) of vegetable juice. Choose vegetables more often than vegetable juice. · Get 2 to 3 servings of low-fat and fat-free dairy each day. A serving is 8 ounces of milk, 1 cup of yogurt, or 1 ½ ounces of cheese. · Eat 6 to 8 servings of grains each day. A serving is 1 slice of bread, 1 ounce of dry cereal, or ½ cup of cooked rice, pasta, or cooked cereal. Try to choose whole-grain products as much as possible. · Limit lean meat, poultry, and fish to 2 servings each day. A serving is 3 ounces, about the size of a deck of cards. · Eat 4 to 5 servings of nuts, seeds, and legumes (cooked dried beans, lentils, and split peas) each week.  A serving is 1/3 cup of nuts, 2 tablespoons of seeds, or ½ cup of cooked beans or peas. · Limit fats and oils to 2 to 3 servings each day. A serving is 1 teaspoon of vegetable oil or 2 tablespoons of salad dressing. · Limit sweets and added sugars to 5 servings or less a week. A serving is 1 tablespoon jelly or jam, ½ cup sorbet, or 1 cup of lemonade. · Eat less than 2,300 milligrams (mg) of sodium a day. If you limit your sodium to 1,500 mg a day, you can lower your blood pressure even more. Tips for success  · Start small. Do not try to make dramatic changes to your diet all at once. You might feel that you are missing out on your favorite foods and then be more likely to not follow the plan. Make small changes, and stick with them. Once those changes become habit, add a few more changes. · Try some of the following:  ¨ Make it a goal to eat a fruit or vegetable at every meal and at snacks. This will make it easy to get the recommended amount of fruits and vegetables each day. ¨ Try yogurt topped with fruit and nuts for a snack or healthy dessert. ¨ Add lettuce, tomato, cucumber, and onion to sandwiches. ¨ Combine a ready-made pizza crust with low-fat mozzarella cheese and lots of vegetable toppings. Try using tomatoes, squash, spinach, broccoli, carrots, cauliflower, and onions. ¨ Have a variety of cut-up vegetables with a low-fat dip as an appetizer instead of chips and dip. ¨ Sprinkle sunflower seeds or chopped almonds over salads. Or try adding chopped walnuts or almonds to cooked vegetables. ¨ Try some vegetarian meals using beans and peas. Add garbanzo or kidney beans to salads. Make burritos and tacos with mashed erickson beans or black beans. Where can you learn more? Go to http://marlo-estelita.info/. Enter M221 in the search box to learn more about \"DASH Diet: Care Instructions. \"  Current as of: September 21, 2016  Content Version: 11.4  © 6740-5844 [employer].  Care instructions adapted under license by [employer] (which disclaims liability or warranty for this information). If you have questions about a medical condition or this instruction, always ask your healthcare professional. [address] any warranty or liability for your use of this information.

## 2018-01-16 NOTE — LETTER
NOTIFICATION RETURN TO WORK / SCHOOL 
 
1/16/2018 1:23 PM 
 
Ms. Wilma Norton [address] [address] To Whom It May Concern: 
 
Wilma Norton is currently under the care of [facility]. She will return to work/school on: 01/17/18. Her symptoms began on 01/10/18 so please excuse her for those days as well. If there are questions or concerns please have the patient contact our office.  
 
 
 
Sincerely, 
 
 
Bobby Alvarado, DO

## 2018-01-16 NOTE — MR AVS SNAPSHOT
[address] [place] [address] 
[address] [place] [address] 
[phone] Patient: Brain Hand MRN:  [id number] Visit Information Date & Time Provider Department Dept. Phone Encounter #  
 1/16/2018 11:00 AM Johnnie Stone DO [facility] [phone] [id number] Follow-up Instructions Return in about 3 months (around 4/16/2018) for bp, referral follow up. Upcoming Health Maintenance Date Due  
 BREAST CANCER SCRN MAMMOGRAM 3/28/2019 PAP AKA CERVICAL CYTOLOGY 3/28/2020 COLONOSCOPY 9/19/2026 DTaP/Tdap/Td series (2 - Td) 3/28/2027 Allergies as of 1/16/2018  Review Complete On: 1/16/2018 By: Johnnie Stone DO Severity Noted Reaction Type Reactions Prempro [Conj Estrog-medroxyprogest Ace]  08/08/2011    Other (comments) Benign Right breast mass. Current Immunizations  Reviewed on 1/16/2018 Name Date Influenza Vaccine 10/6/2017, 10/1/2016, 10/1/2014 Influenza Vaccine Split 10/21/2011, 10/18/2010 TD Vaccine 8/31/2004 Tdap 3/28/2017 Reviewed by Johnnie Stone DO on 1/16/2018 at  1:17 PM  
You Were Diagnosed With   
  
 Codes Comments Heart palpitations    -  Primary ICD-10-CM: R00.2 ICD-9-CM: 785.1 due to increased green tea/caffeine vs other Dyslipidemia     ICD-10-CM: E78.5 ICD-9-CM: 272.4 Essential hypertension with goal blood pressure less than 140/90     ICD-10-CM: I10 
ICD-9-CM: 401.9 Vitamin D deficiency     ICD-10-CM: E55.9 ICD-9-CM: 268.9 Episodic paroxysmal hemicrania, not intractable     ICD-10-CM: G44.039 
ICD-9-CM: 339.03 due to bp vs sinus pressure vs tension vs new eyeglass Rx vs other Other chest pain     ICD-10-CM: R07.89 ICD-9-CM: 786.59 due to GI vs cardio vs other Raynaud's phenomenon without gangrene     ICD-10-CM: I73.00 ICD-9-CM: 443.0 intermittent  SOB (shortness of breath)     ICD-10-CM: R06.02 
 ICD-9-CM: 786.05 and BUCKNER with heart racing, recurrent Chronic seasonal allergic rhinitis due to other allergen     ICD-10-CM: J30.2 ICD-9-CM: 477.8 Family history of stroke     ICD-10-CM: Z82.3 ICD-9-CM: V17.1 Family history of heart attack     ICD-10-CM: Z82.49 
ICD-9-CM: V17.3 Family history of breast cancer     ICD-10-CM: Z80.3 ICD-9-CM: V16.3 Acute pain of right shoulder     ICD-10-CM: M25.511 ICD-9-CM: 719.41 due to recurrent lipoma vs other, intermittently Vitals BP Pulse Temp Resp Height(growth percentile) Weight(growth percentile) 141/83 (BP 1 Location: Left arm, BP Patient Position: Sitting) (!) 55 98.1 °F (36.7 °C) (Oral) 16 5' 6\" (1.676 m) 102 lb 14.4 oz (46.7 kg) SpO2 BMI OB Status Smoking Status 100% 16.61 kg/m2 Postmenopausal Former Smoker Vitals History BMI and BSA Data Body Mass Index Body Surface Area  
 16.61 kg/m 2 1.47 m 2 Preferred Pharmacy Pharmacy Name Phone [id number] [phone] Your Updated Medication List  
  
   
This list is accurate as of: 1/16/18  1:23 PM.  Always use your most recent med list.  
  
  
  
  
 ALPRAZolam 0.25 mg tablet Commonly known as:  Miroslava Wyatt Take 1 Tab by mouth three (3) times daily as needed for Anxiety. Max Daily Amount: 0.75 mg. Indications: situational anxiety  
  
 aspirin delayed-release 81 mg tablet Take 1 Tab by mouth daily. Blood Pressure Test Kit-Wrist Kit Check bp daily and as needed Dx:  Hypertension I10  Indications: hypertension FISH OIL 1,000 mg Cap Generic drug:  omega-3 fatty acids-vitamin e Take  by mouth daily. fluticasone 50 mcg/actuation nasal spray Commonly known as:  Alexa Manners 2 Sprays by Both Nostrils route daily. lisinopril 20 mg tablet Commonly known as:  Darin [place] Take 1 Tab by mouth daily. loratadine 10 mg tablet Commonly known as:  Jinlayla Lozano Take 1 Tab by mouth daily. montelukast 10 mg tablet Commonly known as:  SINGULAIR  
TAKE 1 TABLET BY MOUTH EVERY DAY  
  
 MULTIVITAMIN PO Take  by mouth. Takes 3 times/week  
  
 pantoprazole 40 mg tablet Commonly known as:  PROTONIX Take 1 Tab by mouth daily. Indications: Heartburn  
  
 simvastatin 40 mg tablet Commonly known as:  ZOCOR  
TAKE 1 TABLET BY MOUTH AT BEDTIME  
  
 valACYclovir 500 mg tablet Commonly known as:  VALTREX  
TAKE 1 TABLET BY MOUTH EVERY DAY DURING FLARES  
  
 VITAMIN B-12 1,000 mcg tablet Generic drug:  cyanocobalamin Take 1,000 mcg by mouth daily. Prescriptions Printed Refills  
 pantoprazole (PROTONIX) 40 mg tablet 1 Sig: Take 1 Tab by mouth daily. Indications: Heartburn Class: Print Route: Oral  
 Blood Pressure Test Kit-Wrist kit 0 Sig: Check bp daily and as needed Dx:  Hypertension I10  Indications: hypertension Class: Print We Performed the Following REFERRAL TO CARDIOLOGY [SSR39 Custom] Follow-up Instructions Return in about 3 months (around 4/16/2018) for bp, referral follow up. Referral Information Referral ID Referred By Referred To  
  
 [id number] Jeremias Baca MD   
   [address] [place] [address] [address] Phone: [phone] Fax: [phone] Visits Status Start Date End Date 1 New Request 1/16/18 1/16/19 If your referral has a status of pending review or denied, additional information will be sent to support the outcome of this decision. Patient Instructions Indigestion (Dyspepsia or Heartburn): Care Instructions Your Care Instructions Sometimes it can be hard to pinpoint the cause of indigestion.  (It is also called dyspepsia or heartburn.) Most cases of an upset stomach with bloating, burning, burping, and nausea are minor and go away within several hours. Home treatment and over-the-counter medicine often are able to control symptoms. But if you take medicine to relieve your indigestion without making diet and lifestyle changes, your symptoms are likely to return again and again. If you get indigestion often, it may be a sign of a more serious medical problem. Be sure to follow up with your doctor, who may want to do tests to be sure of the cause of your indigestion. Follow-up care is a key part of your treatment and safety. Be sure to make and go to all appointments, and call your doctor if you are having problems. It's also a good idea to know your test results and keep a list of the medicines you take. How can you care for yourself at home? · Your doctor may recommend over-the-counter medicine. For mild or occasional indigestion, antacids such as Gaviscon, Mylanta, Maalox, or Tums, may help. Be safe with medicines. Be careful when you take over-the-counter antacid medicines. Many of these medicines have aspirin in them. Read the label to make sure that you are not taking more than the recommended dose. Too much aspirin can be harmful. · Your doctor also may recommend over-the-counter acid reducers, such as Pepcid AC, Tagamet HB, Zantac 75, or Prilosec. Read and follow all instructions on the label. If you use these medicines often, talk with your doctor. · Change your eating habits. ¨ It's best to eat several small meals instead of two or three large meals. ¨ After you eat, wait 2 to 3 hours before you lie down. ¨ Chocolate, mint, and alcohol can make GERD worse. ¨ Spicy foods, foods that have a lot of acid (like tomatoes and oranges), and coffee can make GERD symptoms worse in some people. If your symptoms are worse after you eat a certain food, you may want to stop eating that food to see if your symptoms get better. · Do not smoke or chew tobacco. Smoking can make GERD worse.  If you need help quitting, talk to your doctor about stop-smoking programs and medicines. These can increase your chances of quitting for good. · If you have GERD symptoms at night, raise the head of your bed 6 to 8 inches. You can do this by putting the frame on blocks or placing a foam wedge under the head of your mattress. (Adding extra pillows does not work.) · Do not wear tight clothing around your middle. · Lose weight if you need to. Losing just 5 to 10 pounds can help. · Do not take anti-inflammatory medicines, such as aspirin, ibuprofen (Advil, Motrin), or naproxen (Aleve). These can irritate the stomach. If you need a pain medicine, try acetaminophen (Tylenol), which does not cause stomach upset. When should you call for help? Call your doctor now or seek immediate medical care if: 
? · You have new or worse belly pain. ? · You are vomiting. ? Watch closely for changes in your health, and be sure to contact your doctor if: 
? · You have new or worse symptoms of indigestion. ? · You have trouble or pain swallowing. ? · You are losing weight. ? · You do not get better as expected. Where can you learn more? Go to http://marlo-estelita.info/. Enter K891 in the search box to learn more about \"Indigestion (Dyspepsia or Heartburn): Care Instructions. \" Current as of: May 12, 2017 Content Version: 11.4 © 7503-7048 [employer]. Care instructions adapted under license by [employer] (which disclaims liability or warranty for this information). If you have questions about a medical condition or this instruction, always ask your healthcare professional. [place] [address] any warranty or liability for your use of this information. DASH Diet: Care Instructions Your Care Instructions The DASH diet is an eating plan that can help lower your blood pressure. DASH stands for Dietary Approaches to Stop Hypertension.  Hypertension is high blood pressure. The DASH diet focuses on eating foods that are high in calcium, potassium, and magnesium. These nutrients can lower blood pressure. The foods that are highest in these nutrients are fruits, vegetables, low-fat dairy products, nuts, seeds, and legumes. But taking calcium, potassium, and magnesium supplements instead of eating foods that are high in those nutrients does not have the same effect. The DASH diet also includes whole grains, fish, and poultry. The DASH diet is one of several lifestyle changes your doctor may recommend to lower your high blood pressure. Your doctor may also want you to decrease the amount of sodium in your diet. Lowering sodium while following the DASH diet can lower blood pressure even further than just the DASH diet alone. Follow-up care is a key part of your treatment and safety. Be sure to make and go to all appointments, and call your doctor if you are having problems. It's also a good idea to know your test results and keep a list of the medicines you take. How can you care for yourself at home? Following the DASH diet · Eat 4 to 5 servings of fruit each day. A serving is 1 medium-sized piece of fruit, ½ cup chopped or canned fruit, 1/4 cup dried fruit, or 4 ounces (½ cup) of fruit juice. Choose fruit more often than fruit juice. · Eat 4 to 5 servings of vegetables each day. A serving is 1 cup of lettuce or raw leafy vegetables, ½ cup of chopped or cooked vegetables, or 4 ounces (½ cup) of vegetable juice. Choose vegetables more often than vegetable juice. · Get 2 to 3 servings of low-fat and fat-free dairy each day. A serving is 8 ounces of milk, 1 cup of yogurt, or 1 ½ ounces of cheese. · Eat 6 to 8 servings of grains each day. A serving is 1 slice of bread, 1 ounce of dry cereal, or ½ cup of cooked rice, pasta, or cooked cereal. Try to choose whole-grain products as much as possible. · Limit lean meat, poultry, and fish to 2 servings each day. A serving is 3 ounces, about the size of a deck of cards. · Eat 4 to 5 servings of nuts, seeds, and legumes (cooked dried beans, lentils, and split peas) each week. A serving is 1/3 cup of nuts, 2 tablespoons of seeds, or ½ cup of cooked beans or peas. · Limit fats and oils to 2 to 3 servings each day. A serving is 1 teaspoon of vegetable oil or 2 tablespoons of salad dressing. · Limit sweets and added sugars to 5 servings or less a week. A serving is 1 tablespoon jelly or jam, ½ cup sorbet, or 1 cup of lemonade. · Eat less than 2,300 milligrams (mg) of sodium a day. If you limit your sodium to 1,500 mg a day, you can lower your blood pressure even more. Tips for success · Start small. Do not try to make dramatic changes to your diet all at once. You might feel that you are missing out on your favorite foods and then be more likely to not follow the plan. Make small changes, and stick with them. Once those changes become habit, add a few more changes. · Try some of the following: ¨ Make it a goal to eat a fruit or vegetable at every meal and at snacks. This will make it easy to get the recommended amount of fruits and vegetables each day. ¨ Try yogurt topped with fruit and nuts for a snack or healthy dessert. ¨ Add lettuce, tomato, cucumber, and onion to sandwiches. ¨ Combine a ready-made pizza crust with low-fat mozzarella cheese and lots of vegetable toppings. Try using tomatoes, squash, spinach, broccoli, carrots, cauliflower, and onions. ¨ Have a variety of cut-up vegetables with a low-fat dip as an appetizer instead of chips and dip. ¨ Sprinkle sunflower seeds or chopped almonds over salads. Or try adding chopped walnuts or almonds to cooked vegetables. ¨ Try some vegetarian meals using beans and peas. Add garbanzo or kidney beans to salads. Make burritos and tacos with mashed erickson beans or black beans. Where can you learn more? Go to http://marlo-estelita.info/. Enter B401 in the search box to learn more about \"DASH Diet: Care Instructions. \" Current as of: September 21, 2016 Content Version: 11.4 © 6762-3761 [employer]. Care instructions adapted under license by [employer] (which disclaims liability or warranty for this information). If you have questions about a medical condition or this instruction, always ask your healthcare professional. [address] any warranty or liability for your use of this information. Shoulder Blade: Exercises Your Care Instructions Here are some examples of typical exercises for your condition. Start each exercise slowly. Ease off the exercise if you start to have pain. Your doctor or physical therapist will tell you when you can start these exercises and which ones will work best for you. How to do the exercises Shoulder roll 1. Stand tall with your chin slightly tucked. Imagine that a string at the top of your head is pulling you straight up. 2. Keep your arms relaxed. All motion will be in your shoulders. 3. Shrug your shoulders up toward your ears, then up and back. [place] your shoulders down and back, like you're sliding your hands down into your back pants pockets. 4. Repeat the circles at least 2 to 4 times. 5. This exercise is also helpful anytime you want to relax. Lower neck and upper back stretch 1. With your arms about shoulder height, clasp your hands in front of you. 2. Drop your chin toward your chest. 
3. Reach straight forward so you are rounding your upper back. Think about pulling your shoulder blades apart. [place] feel a stretch across your upper back and shoulders. Hold for at least 6 seconds. 4. Repeat 2 to 4 times. Triceps stretch 1. Reach your arm straight up. 2. Keeping your elbow in place, bend your arm and reach your hand down behind your back. 3. With your other hand, apply gentle pressure to the bent elbow. [place] feel a stretch at the back of your upper arm and shoulder. Hold about 6 seconds. 4. Repeat 2 to 4 times with each arm. Shoulder stretch 1. Relax your shoulders. 2. Raise one arm to shoulder height, and reach it across your chest. 
3. Pull the arm slightly toward you with your other arm. This will help you get a gentle stretch. Hold for about 6 seconds. 4. Repeat 2 to 4 times. Shoulder blade squeeze 1. Sit or stand up tall with your arms at your sides. 2. Keep your shoulders relaxed and down, not shrugged. 3. Squeeze your shoulder blades together. Hold for 6 seconds, then relax. 4. Repeat 8 to 12 times. Straight-arm shoulder blade squeeze 1. Sit or stand tall. Relax your shoulders. 2. With palms down, hold your elastic tubing or band straight out in front of you. 3. Start with slight tension in the tubing or band, with your hands about shoulder-width apart. 4. Slowly pull straight out to the sides, squeezing your shoulder blades together. Keep your arms straight and at shoulder height. Slowly release. 5. Repeat 8 to 12 times. Rowing 1. [place] your elastic tubing or band at about waist height. Take one end in each hand. 2. Sit or stand with your feet hip-width apart. 3. Hold your arms straight in front of you. Adjust your distance to create slight tension in the tubing or band. 4. Slightly tuck your chin. Relax your shoulders. 5. Without shrugging your shoulders, pull straight back. Your elbows will pass alongside your waist. 
Pull-downs 1. [place] your elastic tubing or band in the top of a closed door. Take one end in each hand. 2. Either sit or stand, depending on what is more comfortable. If you feel unsteady, sit on a chair. 3. Start with your arms up and comfortably apart, elbows straight. There should be a slight tension in the tubing or band. 4. Slightly tuck your chin, and look straight ahead. 5. Keeping your back straight, slowly pull down and back, bending your elbows. 6. Stop where your hands are level with your chin, in a \"goalpost\" position. 7. Repeat 8 to 12 times. Chest T stretch 1. Lie on your back. Raise your knees so they are bent. Plant your feet on the floor, hip-width apart. 2. Tuck your chin, and relax your shoulders. 3. Reach your arms straight out to the sides. If you don't feel a mild stretch in your shoulders and across your chest, use a foam roll or a tightly rolled blanket under your spine, from your tailbone to your head. 4. Relax in this position for at least 15 to 30 seconds while you breathe normally. Repeat 2 to 4 times. 5. As you get used to this stretch, keep adding a little more time until you are able relax in this position for 2 or 3 minutes. When you can relax for at least 2 minutes, you only need to do the exercise 1 time per session. Chest goalpost stretch 1. Lie on your back. Raise your knees so they are bent. Plant your feet on the floor, hip-width apart. 2. Tuck your chin, and relax your shoulders. 3. Reach your arms straight out to the sides. 4. Bend your arms at the elbows, with your hands pointed toward the top of your head. Your arms should make an L on either side of your head. Your palms should be facing up. 5. If you don't feel a mild stretch in your shoulders and across your chest, use a foam roll or tightly rolled blanket under your spine, from your tailbone to your head. 6. Relax in this position for at least 15 to 30 seconds while you breathe normally. Repeat 2 to 4 times. 7. Each day you do this exercise, add a little more time until you can relax in this position for 2 or 3 minutes. When you can relax for at least 2 minutes, you only need to do the exercise 1 time per session. Follow-up care is a key part of your treatment and safety.  Be sure to make and go to all appointments, and call your doctor if you are having problems. It's also a good idea to know your test results and keep a list of the medicines you take. Where can you learn more? Go to http://marlo-estelita.info/. Enter [phone] [id number] in the search box to learn more about \"Shoulder Blade: Exercises. \" Current as of: March 21, 2017 Content Version: 11.4 © 1174-8266 [employer]. Care instructions adapted under license by [employer] (which disclaims liability or warranty for this information). If you have questions about a medical condition or this instruction, always ask your healthcare professional. [place] [address] any warranty or liability for your use of this information. Introducing [facility]! Dear Garret Restrepo: Thank you for requesting a [employer] account. Our records indicate that you already have an active [employer] account. You can access your account anytime at [email or web address] Did you know that you can access your hospital and ER discharge instructions at any time in [employer]? You can also review all of your test results from your hospital stay or ER visit. Additional Information If you have questions, please visit the Frequently Asked Questions section of the [employer] website at [email or web address] Remember, [employer] is NOT to be used for urgent needs. For medical emergencies, dial 911. Now available from your iPhone and Android! Please provide this summary of care documentation to your next provider. Your primary care clinician is listed as Janelle Forte. If you have any questions after today's visit, please call [phone].

## 2018-01-16 NOTE — PROGRESS NOTES
Ana Jacob is a 58 y.o. female  Chief Complaint   Patient presents with    Hypertension     1 mo f/u   [facility] Follow Up     [address]     1. Have you been to the ER, urgent care clinic since your last visit? Hospitalized since your last visit? Yes When: 1/10/18 Where: [address] [facility] Reason for visit: Shortness of Breath    2. Have you seen or consulted any other health care providers outside of the [address] [place] [address] since your last visit? Include any pap smears or colon screening.  No

## 2018-01-16 NOTE — TELEPHONE ENCOUNTER
----- Message from Christopher Ornelas sent at 1/16/2018  3:46 PM EST -----  Regarding: /Refill  Ghulam from [facility] is calling to get clarification on a prescription for a blood pressure machine and wanted to make sure it was okay to fill for arm cuff instead of wrist cuff    Best contact [phone]

## 2018-01-16 NOTE — PATIENT INSTRUCTIONS
Indigestion (Dyspepsia or Heartburn): Care Instructions  Your Care Instructions  Sometimes it can be hard to pinpoint the cause of indigestion. (It is also called dyspepsia or heartburn.) Most cases of an upset stomach with bloating, burning, burping, and nausea are minor and go away within several hours. Home treatment and over-the-counter medicine often are able to control symptoms. But if you take medicine to relieve your indigestion without making diet and lifestyle changes, your symptoms are likely to return again and again. If you get indigestion often, it may be a sign of a more serious medical problem. Be sure to follow up with your doctor, who may want to do tests to be sure of the cause of your indigestion. Follow-up care is a key part of your treatment and safety. Be sure to make and go to all appointments, and call your doctor if you are having problems. It's also a good idea to know your test results and keep a list of the medicines you take. How can you care for yourself at home? · Your doctor may recommend over-the-counter medicine. For mild or occasional indigestion, antacids such as Gaviscon, Mylanta, Maalox, or Tums, may help. Be safe with medicines. Be careful when you take over-the-counter antacid medicines. Many of these medicines have aspirin in them. Read the label to make sure that you are not taking more than the recommended dose. Too much aspirin can be harmful. · Your doctor also may recommend over-the-counter acid reducers, such as Pepcid AC, Tagamet HB, Zantac 75, or Prilosec. Read and follow all instructions on the label. If you use these medicines often, talk with your doctor. · Change your eating habits. ¨ It's best to eat several small meals instead of two or three large meals. ¨ After you eat, wait 2 to 3 hours before you lie down. ¨ Chocolate, mint, and alcohol can make GERD worse.   ¨ Spicy foods, foods that have a lot of acid (like tomatoes and oranges), and coffee can make GERD symptoms worse in some people. If your symptoms are worse after you eat a certain food, you may want to stop eating that food to see if your symptoms get better. · Do not smoke or chew tobacco. Smoking can make GERD worse. If you need help quitting, talk to your doctor about stop-smoking programs and medicines. These can increase your chances of quitting for good. · If you have GERD symptoms at night, raise the head of your bed 6 to 8 inches. You can do this by putting the frame on blocks or placing a foam wedge under the head of your mattress. (Adding extra pillows does not work.)  · Do not wear tight clothing around your middle. · Lose weight if you need to. Losing just 5 to 10 pounds can help. · Do not take anti-inflammatory medicines, such as aspirin, ibuprofen (Advil, Motrin), or naproxen (Aleve). These can irritate the stomach. If you need a pain medicine, try acetaminophen (Tylenol), which does not cause stomach upset. When should you call for help? Call your doctor now or seek immediate medical care if:  ? · You have new or worse belly pain. ? · You are vomiting. ? Watch closely for changes in your health, and be sure to contact your doctor if:  ? · You have new or worse symptoms of indigestion. ? · You have trouble or pain swallowing. ? · You are losing weight. ? · You do not get better as expected. Where can you learn more? Go to http://marlo-estelita.info/. Enter Q234 in the search box to learn more about \"Indigestion (Dyspepsia or Heartburn): Care Instructions. \"  Current as of: May 12, 2017  Content Version: 11.4  © 0056-7770 [employer]. Care instructions adapted under license by [facility] (which disclaims liability or warranty for this information).  If you have questions about a medical condition or this instruction, always ask your healthcare professional. [address] any warranty or liability for your use of this information. DASH Diet: Care Instructions  Your Care Instructions    The DASH diet is an eating plan that can help lower your blood pressure. DASH stands for Dietary Approaches to Stop Hypertension. Hypertension is high blood pressure. The DASH diet focuses on eating foods that are high in calcium, potassium, and magnesium. These nutrients can lower blood pressure. The foods that are highest in these nutrients are fruits, vegetables, low-fat dairy products, nuts, seeds, and legumes. But taking calcium, potassium, and magnesium supplements instead of eating foods that are high in those nutrients does not have the same effect. The DASH diet also includes whole grains, fish, and poultry. The DASH diet is one of several lifestyle changes your doctor may recommend to lower your high blood pressure. Your doctor may also want you to decrease the amount of sodium in your diet. Lowering sodium while following the DASH diet can lower blood pressure even further than just the DASH diet alone. Follow-up care is a key part of your treatment and safety. Be sure to make and go to all appointments, and call your doctor if you are having problems. It's also a good idea to know your test results and keep a list of the medicines you take. How can you care for yourself at home? Following the DASH diet  · Eat 4 to 5 servings of fruit each day. A serving is 1 medium-sized piece of fruit, ½ cup chopped or canned fruit, 1/4 cup dried fruit, or 4 ounces (½ cup) of fruit juice. Choose fruit more often than fruit juice. · Eat 4 to 5 servings of vegetables each day. A serving is 1 cup of lettuce or raw leafy vegetables, ½ cup of chopped or cooked vegetables, or 4 ounces (½ cup) of vegetable juice. Choose vegetables more often than vegetable juice. · Get 2 to 3 servings of low-fat and fat-free dairy each day. A serving is 8 ounces of milk, 1 cup of yogurt, or 1 ½ ounces of cheese.   · Eat 6 to 8 servings of grains each day. A serving is 1 slice of bread, 1 ounce of dry cereal, or ½ cup of cooked rice, pasta, or cooked cereal. Try to choose whole-grain products as much as possible. · Limit lean meat, poultry, and fish to 2 servings each day. A serving is 3 ounces, about the size of a deck of cards. · Eat 4 to 5 servings of nuts, seeds, and legumes (cooked dried beans, lentils, and split peas) each week. A serving is 1/3 cup of nuts, 2 tablespoons of seeds, or ½ cup of cooked beans or peas. · Limit fats and oils to 2 to 3 servings each day. A serving is 1 teaspoon of vegetable oil or 2 tablespoons of salad dressing. · Limit sweets and added sugars to 5 servings or less a week. A serving is 1 tablespoon jelly or jam, ½ cup sorbet, or 1 cup of lemonade. · Eat less than 2,300 milligrams (mg) of sodium a day. If you limit your sodium to 1,500 mg a day, you can lower your blood pressure even more. Tips for success  · Start small. Do not try to make dramatic changes to your diet all at once. You might feel that you are missing out on your favorite foods and then be more likely to not follow the plan. Make small changes, and stick with them. Once those changes become habit, add a few more changes. · Try some of the following:  ¨ Make it a goal to eat a fruit or vegetable at every meal and at snacks. This will make it easy to get the recommended amount of fruits and vegetables each day. ¨ Try yogurt topped with fruit and nuts for a snack or healthy dessert. ¨ Add lettuce, tomato, cucumber, and onion to sandwiches. ¨ Combine a ready-made pizza crust with low-fat mozzarella cheese and lots of vegetable toppings. Try using tomatoes, squash, spinach, broccoli, carrots, cauliflower, and onions. ¨ Have a variety of cut-up vegetables with a low-fat dip as an appetizer instead of chips and dip. ¨ Sprinkle sunflower seeds or chopped almonds over salads. Or try adding chopped walnuts or almonds to cooked vegetables.   ¨ Try some vegetarian meals using beans and peas. Add garbanzo or kidney beans to salads. Make burritos and tacos with mashed erickson beans or black beans. Where can you learn more? Go to http://marlo-estelita.info/. Enter C601 in the search box to learn more about \"DASH Diet: Care Instructions. \"  Current as of: September 21, 2016  Content Version: 11.4  © 3298-9057 [employer]. Care instructions adapted under license by [employer] (which disclaims liability or warranty for this information). If you have questions about a medical condition or this instruction, always ask your healthcare professional. [place] [address] any warranty or liability for your use of this information. Shoulder Blade: Exercises  Your Care Instructions  Here are some examples of typical exercises for your condition. Start each exercise slowly. Ease off the exercise if you start to have pain. Your doctor or physical therapist will tell you when you can start these exercises and which ones will work best for you. How to do the exercises  Shoulder roll    1. Stand tall with your chin slightly tucked. Imagine that a string at the top of your head is pulling you straight up. 2. Keep your arms relaxed. All motion will be in your shoulders. 3. Shrug your shoulders up toward your ears, then up and back. [place] your shoulders down and back, like you're sliding your hands down into your back pants pockets. 4. Repeat the circles at least 2 to 4 times. 5. This exercise is also helpful anytime you want to relax. Lower neck and upper back stretch    1. With your arms about shoulder height, clasp your hands in front of you. 2. Drop your chin toward your chest.  3. Reach straight forward so you are rounding your upper back. Think about pulling your shoulder blades apart. [place] feel a stretch across your upper back and shoulders. Hold for at least 6 seconds. 4. Repeat 2 to 4 times.   Triceps stretch    1. Reach your arm straight up. 2. Keeping your elbow in place, bend your arm and reach your hand down behind your back. 3. With your other hand, apply gentle pressure to the bent elbow. [place] feel a stretch at the back of your upper arm and shoulder. Hold about 6 seconds. 4. Repeat 2 to 4 times with each arm. Shoulder stretch    1. Relax your shoulders. 2. Raise one arm to shoulder height, and reach it across your chest.  3. Pull the arm slightly toward you with your other arm. This will help you get a gentle stretch. Hold for about 6 seconds. 4. Repeat 2 to 4 times. Shoulder blade squeeze    1. Sit or stand up tall with your arms at your sides. 2. Keep your shoulders relaxed and down, not shrugged. 3. Squeeze your shoulder blades together. Hold for 6 seconds, then relax. 4. Repeat 8 to 12 times. Straight-arm shoulder blade squeeze    1. Sit or stand tall. Relax your shoulders. 2. With palms down, hold your elastic tubing or band straight out in front of you. 3. Start with slight tension in the tubing or band, with your hands about shoulder-width apart. 4. Slowly pull straight out to the sides, squeezing your shoulder blades together. Keep your arms straight and at shoulder height. Slowly release. 5. Repeat 8 to 12 times. Rowing    1. [place] your elastic tubing or band at about waist height. Take one end in each hand. 2. Sit or stand with your feet hip-width apart. 3. Hold your arms straight in front of you. Adjust your distance to create slight tension in the tubing or band. 4. Slightly tuck your chin. Relax your shoulders. 5. Without shrugging your shoulders, pull straight back. Your elbows will pass alongside your waist.  Pull-downs    1. [place] your elastic tubing or band in the top of a closed door. Take one end in each hand. 2. Either sit or stand, depending on what is more comfortable. If you feel unsteady, sit on a chair.   3. Start with your arms up and comfortably apart, elbows straight. There should be a slight tension in the tubing or band. 4. Slightly tuck your chin, and look straight ahead. 5. Keeping your back straight, slowly pull down and back, bending your elbows. 6. Stop where your hands are level with your chin, in a \"goalpost\" position. 7. Repeat 8 to 12 times. Chest T stretch    1. Lie on your back. Raise your knees so they are bent. Plant your feet on the floor, hip-width apart. 2. Tuck your chin, and relax your shoulders. 3. Reach your arms straight out to the sides. If you don't feel a mild stretch in your shoulders and across your chest, use a foam roll or a tightly rolled blanket under your spine, from your tailbone to your head. 4. Relax in this position for at least 15 to 30 seconds while you breathe normally. Repeat 2 to 4 times. 5. As you get used to this stretch, keep adding a little more time until you are able relax in this position for 2 or 3 minutes. When you can relax for at least 2 minutes, you only need to do the exercise 1 time per session. Chest goalpost stretch    1. Lie on your back. Raise your knees so they are bent. Plant your feet on the floor, hip-width apart. 2. Tuck your chin, and relax your shoulders. 3. Reach your arms straight out to the sides. 4. Bend your arms at the elbows, with your hands pointed toward the top of your head. Your arms should make an L on either side of your head. Your palms should be facing up. 5. If you don't feel a mild stretch in your shoulders and across your chest, use a foam roll or tightly rolled blanket under your spine, from your tailbone to your head. 6. Relax in this position for at least 15 to 30 seconds while you breathe normally. Repeat 2 to 4 times. 7. Each day you do this exercise, add a little more time until you can relax in this position for 2 or 3 minutes. When you can relax for at least 2 minutes, you only need to do the exercise 1 time per session.   Follow-up care is a key part of your treatment and safety. Be sure to make and go to all appointments, and call your doctor if you are having problems. It's also a good idea to know your test results and keep a list of the medicines you take. Where can you learn more? Go to http://marlo-estelita.info/. Enter ([phone] in the search box to learn more about \"Shoulder Blade: Exercises. \"  Current as of: March 21, 2017  Content Version: 11.4  © 8841-4853 [employer]. Care instructions adapted under license by [employer] (which disclaims liability or warranty for this information). If you have questions about a medical condition or this instruction, always ask your healthcare professional. [address] any warranty or liability for your use of this information.

## 2018-01-22 NOTE — TELEPHONE ENCOUNTER
Writer called [facility] back to notify Dr. Trent Fees approval to switch to arm cuff and that pt would need a small-medium cuff.  Writer spoke with pharmacist. Writer notified of Dr. Virginie Tom approval. Pharmacist verbalized appreciation and acknowledgement

## 2018-01-24 ENCOUNTER — OFFICE VISIT (OUTPATIENT)
Dept: CARDIOLOGY CLINIC | Age: 63
End: 2018-01-24

## 2018-01-24 VITALS
HEIGHT: 66 IN | OXYGEN SATURATION: 98 % | RESPIRATION RATE: 16 BRPM | DIASTOLIC BLOOD PRESSURE: 82 MMHG | WEIGHT: 104 LBS | SYSTOLIC BLOOD PRESSURE: 122 MMHG | HEART RATE: 67 BPM | BODY MASS INDEX: 16.71 KG/M2

## 2018-01-24 DIAGNOSIS — R06.02 SHORTNESS OF BREATH: ICD-10-CM

## 2018-01-24 DIAGNOSIS — R00.2 HEART PALPITATIONS: Primary | ICD-10-CM

## 2018-01-24 DIAGNOSIS — Z82.49 FAMILY HISTORY OF EARLY CAD: ICD-10-CM

## 2018-01-24 NOTE — PROGRESS NOTES
[place]: Alex Holiday  [phone]    HPI: Ms. Angelina Butt is a 57 yo F with hyperlipidemia seen in 2009 for lightheadedness; 48 hr holter noted no arrhythmia with lightheaded spells, echo 5/2009 was normal, referred now by Dr. Javy Ornelas for evaluation of recent chest pains. Here for recent palpitations. She denies any chest pain. She has had occasional shortness of breath, but she has not been exercising. With regard to the palpitations, they have been occurring approximately one to two times a week lasting for a few minutes at a time with no particular triggers. No associated lightheadedness, dizziness or syncope. She notes this last occurred last week when she was sitting at her desk. She has also had occasional chest discomfort associated with food and was started back on her antacid and this has not recurred. Soc Hx. , kids, Lisa Shipmaner one. Assessment and Plan:   1. Palpitations. Will obtain an event monitor to evaluate for possible arrhythmia. 2. Chest pain, atypical.  Improved with PPI, consistent with GI etiology. Encouraged her to resume regular exercise. If she has any symptoms with exercise, would consider a stress test.      She  has a past medical history of Allergic rhinitis, cause unspecified; Arthritis; Breast mass (06/1999); Chickenpox (childhood); Dizziness (10/2011); EBV positive mononucleosis syndrome (10/2010); Exposure to hepatitis B; Genital HSV; HA (headache); Heart palpitations (05/21/09); Hypoglycemia; Menopausal and postmenopausal disorder (1999); MRSA infection (12/2011); Osteopenia; Other and unspecified hyperlipidemia; Raynaud phenomenon; RLS (restless legs syndrome) (2008); Shingles (teenager); and Vitamin D deficiency (2008). All other systems negative except as above. PE  Vitals:    01/24/18 1522   BP: 122/82   Pulse: 67   Resp: 16   SpO2: 98%   Weight: 104 lb (47.2 kg)   Height: 5' 6\" (1.676 m)    Body mass index is 16.79 kg/(m^2). General appearance - alert, well appearing, and in no distress  Mental status - affect appropriate to mood  Neck - supple, no JVD  Chest - clear to auscultation, no wheezes, rales or rhonchi  Heart - normal rate, regular rhythm, normal S1, S2, no murmurs, rubs, clicks or gallops  Abdomen - soft, nontender, nondistended  Extremities - peripheral pulses normal, no pedal edema  Recent Labs:  Lab Results   Component Value Date/Time    Cholesterol, total 177 03/21/2017 08:11 AM    HDL Cholesterol 71 03/21/2017 08:11 AM    LDL, calculated 94 03/21/2017 08:11 AM    Triglyceride 58 03/21/2017 08:11 AM    CHOL/HDL Ratio 3.2 10/18/2010 11:28 AM     Lab Results   Component Value Date/Time    Creatinine 0.76 01/10/2018 01:14 PM     Lab Results   Component Value Date/Time    BUN 11 01/10/2018 01:14 PM     Lab Results   Component Value Date/Time    Potassium 3.5 01/10/2018 01:14 PM     Lab Results   Component Value Date/Time    Hemoglobin A1c 5.1 03/21/2017 08:11 AM     Lab Results   Component Value Date/Time    HGB 12.4 01/10/2018 01:14 PM     Lab Results   Component Value Date/Time    PLATELET 657 66/34/9558 01:14 PM       Reviewed:  Past Medical History:   Diagnosis Date    Allergic rhinitis, cause unspecified     Arthritis     fingers and toes    Breast mass 06/1999    Right. Benign. due to Prempro. Dr. Luna Jobs    Chickenpox childhood    Dizziness 10/2011    Dr. Anastasia Hernandez.  EBV positive mononucleosis syndrome 10/2010    positive titer.  Exposure to hepatitis B         Genital HSV     HA (headache)     Heart palpitations 05/21/09    Dr. Lowery Schilder    Hypoglycemia     Menopausal and postmenopausal disorder 1999    MRSA infection 12/2011    Right hip. Txd Bactrim.  Osteopenia     Dr. Vamsi Oviedo. Dr. Genia Newman.     Other and unspecified hyperlipidemia     Raynaud phenomenon     RLS (restless legs syndrome) 2008    Shingles teenager    R arm    Vitamin D deficiency 2008     History   Smoking Status    Former Smoker    Packs/day: 0.50    Years: 10.00    Types: Cigarettes    Quit date: 1/1/1985   Smokeless Tobacco    Never Used     History   Alcohol Use    1.5 oz/week    3 Standard drinks or equivalent per week     Comment: Occasional     Allergies   Allergen Reactions    Prempro [Conj Estrog-Medroxyprogest Ace] Other (comments)     Benign Right breast mass. Current Outpatient Prescriptions   Medication Sig    pantoprazole (PROTONIX) 40 mg tablet Take 1 Tab by mouth daily. Indications: Heartburn    Blood Pressure Test Kit-Wrist kit Check bp daily and as needed Dx:  Hypertension I10  Indications: hypertension    montelukast (SINGULAIR) 10 mg tablet TAKE 1 TABLET BY MOUTH EVERY DAY    lisinopril (PRINIVIL, ZESTRIL) 20 mg tablet Take 1 Tab by mouth daily.  valACYclovir (VALTREX) 500 mg tablet TAKE 1 TABLET BY MOUTH EVERY DAY DURING FLARES    ALPRAZolam (XANAX) 0.25 mg tablet Take 1 Tab by mouth three (3) times daily as needed for Anxiety. Max Daily Amount: 0.75 mg. Indications: situational anxiety    aspirin delayed-release 81 mg tablet Take 1 Tab by mouth daily.  loratadine (CLARITIN) 10 mg tablet Take 1 Tab by mouth daily.  fluticasone (FLONASE) 50 mcg/actuation nasal spray 2 Sprays by Both Nostrils route daily.  simvastatin (ZOCOR) 40 mg tablet TAKE 1 TABLET BY MOUTH AT BEDTIME    cyanocobalamin (VITAMIN B-12) 1,000 mcg tablet Take 1,000 mcg by mouth daily.  omega-3 fatty acids-vitamin e (FISH OIL) 1,000 mg Cap Take  by mouth daily.  MULTIVITAMINS (MULTIVITAMIN PO) Take  by mouth. Takes 3 times/week     No current facility-administered medications for this visit.         Anthony Yeh MD  [facility] [place]  [address] [facility] [address]  [address]

## 2018-01-25 ENCOUNTER — CLINICAL SUPPORT (OUTPATIENT)
Dept: CARDIOLOGY CLINIC | Age: 63
End: 2018-01-25

## 2018-01-25 DIAGNOSIS — Z82.49 FAMILY HISTORY OF EARLY CAD: ICD-10-CM

## 2018-01-25 DIAGNOSIS — R06.02 SHORTNESS OF BREATH: ICD-10-CM

## 2018-01-25 DIAGNOSIS — R00.2 HEART PALPITATIONS: ICD-10-CM

## 2018-02-14 ENCOUNTER — TELEPHONE (OUTPATIENT)
Dept: CARDIOLOGY CLINIC | Age: 63
End: 2018-02-14

## 2018-02-14 NOTE — TELEPHONE ENCOUNTER
Called patient. Verified identity.  Informed her of the following event monitor results per Dr. Kristine Cordova.

## 2018-02-14 NOTE — TELEPHONE ENCOUNTER
----- Message from Karla Cox MD sent at 2/14/2018 11:46 AM EST -----  Please let pt know event monitor was normal. thx  ----- Message -----     From: Radha Pierson     Sent: 2/14/2018  11:37 AM       To: Karla Cox MD

## 2018-02-22 ENCOUNTER — OFFICE VISIT (OUTPATIENT)
Dept: FAMILY MEDICINE CLINIC | Age: 63
End: 2018-02-22

## 2018-02-22 VITALS
TEMPERATURE: 100.3 F | HEART RATE: 103 BPM | RESPIRATION RATE: 19 BRPM | HEIGHT: 66 IN | OXYGEN SATURATION: 95 % | BODY MASS INDEX: 16.57 KG/M2 | DIASTOLIC BLOOD PRESSURE: 75 MMHG | WEIGHT: 103.1 LBS | SYSTOLIC BLOOD PRESSURE: 114 MMHG

## 2018-02-22 DIAGNOSIS — R05.9 COUGH: ICD-10-CM

## 2018-02-22 DIAGNOSIS — R52 BODY ACHES: Primary | ICD-10-CM

## 2018-02-22 DIAGNOSIS — J02.9 SORE THROAT: ICD-10-CM

## 2018-02-22 DIAGNOSIS — J02.0 STREP THROAT: ICD-10-CM

## 2018-02-22 LAB
QUICKVUE INFLUENZA TEST: NEGATIVE
S PYO AG THROAT QL: POSITIVE
VALID INTERNAL CONTROL?: YES
VALID INTERNAL CONTROL?: YES

## 2018-02-22 RX ORDER — AMOXICILLIN 500 MG/1
500 CAPSULE ORAL 2 TIMES DAILY
Qty: 20 CAP | Refills: 0 | Status: SHIPPED | OUTPATIENT
Start: 2018-02-22 | End: 2018-03-04

## 2018-02-22 RX ORDER — OSELTAMIVIR PHOSPHATE 75 MG/1
75 CAPSULE ORAL 2 TIMES DAILY
Qty: 10 CAP | Refills: 0 | Status: SHIPPED | OUTPATIENT
Start: 2018-02-22 | End: 2018-02-27

## 2018-02-22 NOTE — PROGRESS NOTES
S: Yasmany Hahn is a 58 y.o. female who presents with fever, sore throat,     Assessment/Plan:  1. Body aches, fever  -flu = negative, but has s/s of flu  -tamiflu 75mg bid x5days    2. 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 Benign. due to Prempro. Dr. Omid Hendricks    Chickenpox childhood    Dizziness 10/2011    Dr. Félix Cornell.  EBV positive mononucleosis syndrome 10/2010    positive titer.  Exposure to hepatitis B         Genital HSV     HA (headache)     Heart palpitations 05/21/09    Dr. Rios Screen    Hypoglycemia     Menopausal and postmenopausal disorder 1999    MRSA infection 12/2011    Right hip. Txd Bactrim.  Osteopenia     Dr. Zamorano Found. Dr. Abigail Potter.  Other and unspecified hyperlipidemia     Raynaud phenomenon     RLS (restless legs syndrome) 2008    Shingles teenager    R arm    Vitamin D deficiency 2008        Current Outpatient Prescriptions   Medication Sig Dispense Refill    pantoprazole (PROTONIX) 40 mg tablet Take 1 Tab by mouth daily.  Indications: Heartburn 90 Tab 1    Blood Pressure Test Kit-Wrist kit Check bp daily and as needed Dx:  Hypertension I10  Indications: hypertension 1 Kit 0    montelukast (SINGULAIR) 10 mg tablet TAKE 1 TABLET BY MOUTH EVERY DAY 90 Tab 3    lisinopril (PRINIVIL, ZESTRIL) 20 mg tablet Take 1 Tab by mouth daily. 90 Tab 3    valACYclovir (VALTREX) 500 mg tablet TAKE 1 TABLET BY MOUTH EVERY DAY DURING FLARES 90 Tab 3    ALPRAZolam (XANAX) 0.25 mg tablet Take 1 Tab by mouth three (3) times daily as needed for Anxiety. Max Daily Amount: 0.75 mg. Indications: situational anxiety 15 Tab 1    aspirin delayed-release 81 mg tablet Take 1 Tab by mouth daily. 90 Tab 3    loratadine (CLARITIN) 10 mg tablet Take 1 Tab by mouth daily. 15 Tab 0    fluticasone (FLONASE) 50 mcg/actuation nasal spray 2 Sprays by Both Nostrils route daily. 1 Bottle 0    simvastatin (ZOCOR) 40 mg tablet TAKE 1 TABLET BY MOUTH AT BEDTIME 90 tablet 3    cyanocobalamin (VITAMIN B-12) 1,000 mcg tablet Take 1,000 mcg by mouth daily.  omega-3 fatty acids-vitamin e (FISH OIL) 1,000 mg Cap Take  by mouth daily.  MULTIVITAMINS (MULTIVITAMIN PO) Take  by mouth. Takes 3 times/week          Allergies   Allergen Reactions    Prempro [Conj Estrog-Medroxyprogest Ace] Other (comments)     Benign Right breast mass. O: VS:   Visit Vitals    /75 (BP 1 Location: Left arm, BP Patient Position: Sitting)    Pulse (!) 103    Temp 100.3 °F (37.9 °C) (Oral)    Resp 19    Ht 5' 6\" (1.676 m)    Wt 103 lb 1.6 oz (46.8 kg)    SpO2 95%    BMI 16.64 kg/m2       Data Reviewed:  Rapid Strep: positive  Flu: negative    GENERAL: Pee Pugh is in no acute distress. Non-toxic. HEAD:  Normocephalic. Atraumatic. Non tender sinuses x 4. EYE: PERRLA. EOMs intact. Sclera anicteric without injection. Clear drainage. EARS: Hearing intact bilaterally. External ear canals normal without evidence of blood or swelling. RightTM intact, pearly grey with landmarks visible. No erythema or effusion.  Left TM intact with effusion and erythema  NOSE: Patent. Nasal turbinates pink. No polyps noted. Erythema. Clear discharge. MOUTH: mucous membranes pink and moist. Posterior pharynx normal with cobblestone appearance. No erythema, white exudate or obstruction. NECK: supple. Midline trachea. No anterior cervical lymphadenopathy noted. RESP: Breath sounds are symmetrical bilaterally. Unlabored without SOB. Speaking in full sentences. Clear to auscultation bilaterally anteriorly and posteriorly. No wheezes. No rales or rhonchi. CV: normal rate. Regular rhythm. S1, S2 audible. No murmur noted. No rubs, clicks or gallops noted. ABDOMEN: Soft and nondistended. No tenderness on palpation. SKIN: Skin is warm and dry. Turgor is normal.   ______________________________________________________________________  Patient education was done. Advised on nutrition, tobacco, and good handwashing. Counseling included discussion of diagnosis, differentials, treatment options, prescribed treatment, warning signs and follow up. Medication risks/benefits, costs, interactions and alternatives discussed with patient.      Patient verbalized understanding and agreed to plan of care. If you are not feeling better or you begin to feel worse or develop new symptoms in 3-4 days please call. Follow up in 1-2 weeks if symptoms persist or progress.

## 2018-02-22 NOTE — PROGRESS NOTES
Chief Complaint   Patient presents with    Cold Symptoms      x 1 day       Alexander Mason  Identified pt with two pt identifiers(name and ). Chief Complaint   Patient presents with    Cold Symptoms      x 1 day       1. Have you been to the ER, urgent care clinic since your last visit? No   Hospitalized since your last visit? NO    2. Have you seen or consulted any other health care providers outside of the [address] [place] [address] since your last visit? Include any pap smears or colon screening. NO    Today's provider has been notified of reason for visit, vitals and flowsheets obtained on patients. Patient received paperwork for advance directive during previous visit but has not completed at this time     Reviewed record In preparation for visit, huddled with provider and have obtained necessary documentation      There are no preventive care reminders to display for this patient.     Wt Readings from Last 3 Encounters:   18 103 lb 1.6 oz (46.8 kg)   18 104 lb (47.2 kg)   18 102 lb 14.4 oz (46.7 kg)     Temp Readings from Last 3 Encounters:   18 100.3 °F (37.9 °C) (Oral)   18 98.1 °F (36.7 °C) (Oral)   01/10/18 98.2 °F (36.8 °C)     BP Readings from Last 3 Encounters:   18 114/75   18 122/82   18 141/83     Pulse Readings from Last 3 Encounters:   18 (!) 103   18 67   18 (!) 55     Vitals:    18 1419   BP: 114/75   Pulse: (!) 103   Resp: 19   Temp: 100.3 °F (37.9 °C)   TempSrc: Oral   SpO2: 95%   Weight: 103 lb 1.6 oz (46.8 kg)   Height: 5' 6\" (1.676 m)   PainSc:   4         Learning Assessment:  :     Learning Assessment 2/3/2014   PRIMARY LEARNER Patient   HIGHEST LEVEL OF EDUCATION - PRIMARY LEARNER  GRADUATED HIGH SCHOOL OR GED   BARRIERS PRIMARY LEARNER NONE   CO-LEARNER CAREGIVER No   CO-LEARNER NAME n/a   PRIMARY LANGUAGE ENGLISH   LEARNER PREFERENCE PRIMARY DEMONSTRATION   LEARNING SPECIAL TOPICS no   ANSWERED BY Patient RELATIONSHIP SELF       Depression Screening:  :     PHQ over the last two weeks 2/22/2018   Little interest or pleasure in doing things Not at all   Feeling down, depressed or hopeless Not at all   Total Score PHQ 2 0       Fall Risk Assessment:  :     No flowsheet data found. Abuse Screening:  :     No flowsheet data found. ADL Screening:  :     No flowsheet data found. Medication reconciliation up to date and corrected with patient at this time.      Verbal order to do influenza test

## 2018-02-22 NOTE — PATIENT INSTRUCTIONS
An upper respiratory infection (URI) is an infection of the throat and nose. It is also known as \"the common cold\". 90% of these infections are caused by a virus. Viral infections do not respond to antibiotic treatment, only bacteria are killed by antibiotics. Therefore, supportive treatment is recommended to help with symptoms. Symptoms usually subside after 7-10 days (or longer if you smoke). If symptoms worsen or persist after 14 days, or if you have fever over 101.3, fever for 5 days or more, wheezing, or shortness of breath, please contact the office. To prevent URIs, wash hands frequently (epecially before and after eating - use hand  if you are in a public place.) Eat a healthy diet, get regular exercise and sufficient sleep. Reduce your exposure to cigarette smoke. If you need to cough or sneeze, use the crook of your arm to cover your mouth. Supportive Care    1) Alternate 400mg Ibuprofen and 650mg Tylenol every 4 hours as needed for sinus pain and discomfort. Make sure to take Ibuprofen with food as it can cause stomach upset. Do not exceed 3000 mg Tylenol per day. 2) Take a daily antihistamine to reduce symptoms such as sneezing, runny nose and itchy eyes. You can buy these over the counter (OTC) (no prescription needed) such as Claritin, Allegra or Zyrtec. Take this daily as it takes 3-4 weeks for the full therapeutic effect to take place. Follow directions on package. If symptoms of sneezing, coughing and runny nose are severe, you can try taking Benadryl at night before bed. However, Benadryl can cause drowsiness, so please use caution. Elderly people should not use Benadryl due to side effects and increase risk of falling. 3) OTC Robitussin or Delsym for cough relief. Follow directions on package. Do not exceed maximum dose. May cause drowsiness.   If you have high blood pressure or kidney problems, avoid cough medicines that contain decongestants -- such as pseudoephedrine, ephedrine, phenylephrine, naphazoline and oxymetazoline. 4) Use an OTC decongestant nasal spray such as Flonase, Nasonex, or Rhinocort. These contain a steroid and will help reduce congestion. You can spray 2x in each nostril two times a day for 3-5 days. Use the opposite hand of the nostril you are spraying and look down at your toes when you administer the nasal spray. This ensures the spray is applied to the nasal tissue properly. If you use Afrin for nasal congestion, DO NOT use for more than 5 days (due to rebound congestion)     Saline nasal spray can be used daily and will help restore moisture to dry nasal passages, wash away allergens and can help prevent inflammation of the mucous membranes. 5) Mucinex (guaifenesin) helps thin mucus and may make it easier to clear it from head, throat and lungs. 6) Increase your fluid consumption, especially water. Eat a well-balanced diet and avoid dairy and sugar as these can increase or thicken congestion. 7) Warm salt water gargle can help alleviate sore throat (1 teaspoon in 8 oz warm water)    8) Honey is a natural cough suppressor - can take a teaspoon of honey for cough or mix with hot tea. Local honey can help inoculate against local pollen that causes allergic reactions. (It has to be local honey from our area. You can usually find local honey at farmers' markets.)     9) Humidify air, especially in bedroom at night, as it may help with nasal and throat dryness. Breathing in steam from a shower may help loosen mucus and soothe an irritated throat. 10) Get plenty of rest!    11) A warm soak in a bath can help ease muscle and body aches.  Add 1 cup of epsom salt to water (Dr. Kirby Villasenor is a good one- at [address] for around $6).    12) Emer[employer] C or [place] - vitamin supplements containing high doses of vitamin C, Vitamin B12 and B6 that can is marketed to help prevent or stop colds (although this has not been confirmed by scientific research)     13) If you were given an antibiotic, throw away your old toothbrush and invest in a new one (or put it in the  for a cycle) after you have been on the antibiotic for 24 hours. You can get reinfected by using a contaminated one! If symptoms persist for more than 10 days or if you have a fever above 102, please call the office. Complications of URI include an infection of the skin around the eye and other infections. Watch for signs of fever, chills, body aches or any areas of red, warm, swollen and tender skin. Call immediately if you notice these signs. Influenza - spread primarily through respiratory droplets (when someone coughs or sneezes near a susceptible person). Most healthy adults may be able to infect others beginning 1 day before symptoms develop and up to 5 to 7 days after becoming sick. Some people, especially young children and people with weakened immune systems, might be able to infect others for an even longer time. Incubation period is 1-4 days after exposure. 1) Please takeTamiflu (oseltamivir) 75 mg two times a day for 5 days, if given a prescription. Tamiflu works by preventing the influenza virus from multiplying and reducing the symptoms of the flu. It inhibits the virus, but does not kill the viruses already present in the body. Consequently, if there are too many infected cells present in the body, tamiflu won't be effective in stopping the infection. That is why it is important to get tamiflu within the first 48 hours of symptoms. 2) You may alternate Ibuprofen (200-400mg) with Tylenol (325 mg) every 4 hours as needed for pain and discomfort. 3) It is important to stay hydrated. Increase your fluid consumption, especially water. Avoid sodas and alcohol. 4) Eat a well-balanced and avoid dairy and sugar as these can increase or thicken congestion.   5) If you are having sinus drainage or coughing, you can use mucinex (follow directions on box). 6) A warm soak in a bath can help ease muscle and body aches. Add 1 cup of epsom salt to water (Dr. Victor Manuel Morris is a good one- at [facility] for around $6). 7) Return to work or school when no fever for 24 hours while off of fever reducing medication. Avoid strenuous activity until you are feeling better. 8) Please wash hands frequently and often and use proper hygiene when cooking and toileting. Call the office or come to the emergency room for progressive symptoms of fatigue or lethargy, shortness of breath, chest pains, inability to hold down fluids, stiff neck or other new symptoms. Get flu vaccine every year!

## 2018-02-22 NOTE — MR AVS SNAPSHOT
[address] [facility], [place] 
 
 
 [address] [facility] 
[address] [place] [address] 
[phone] Patient: Lexi Coronado MRN:  [id number] Visit Information Date & Time Provider Department Dept. Phone Encounter #  
 2/22/2018  2:00 PM Reggie Will NP [facility] [phone] [id number] Upcoming Health Maintenance Date Due  
 BREAST CANCER SCRN MAMMOGRAM 3/28/2019 PAP AKA CERVICAL CYTOLOGY 3/28/2020 COLONOSCOPY 9/19/2026 DTaP/Tdap/Td series (2 - Td) 3/28/2027 Allergies as of 2/22/2018  Review Complete On: 2/22/2018 By: Reggie Will NP Severity Noted Reaction Type Reactions Prempro [Conj Estrog-medroxyprogest Ace]  08/08/2011    Other (comments) Benign Right breast mass. Current Immunizations  Reviewed on 1/16/2018 Name Date Influenza Vaccine 10/6/2017, 10/1/2016, 10/1/2014 Influenza Vaccine Split 10/21/2011, 10/18/2010 TD Vaccine 8/31/2004 Tdap 3/28/2017 Not reviewed this visit You Were Diagnosed With   
  
 Codes Comments Body aches    -  Primary ICD-10-CM: M63 ICD-9-CM: 780.96 Cough     ICD-10-CM: R05 ICD-9-CM: 786.2 Sore throat     ICD-10-CM: J02.9 ICD-9-CM: 142 Strep throat     ICD-10-CM: J02.0 ICD-9-CM: 034.0 Vitals BP Pulse Temp Resp Height(growth percentile) Weight(growth percentile) 114/75 (BP 1 Location: Left arm, BP Patient Position: Sitting) (!) 103 100.3 °F (37.9 °C) (Oral) 19 5' 6\" (1.676 m) 103 lb 1.6 oz (46.8 kg) SpO2 BMI OB Status Smoking Status 95% 16.64 kg/m2 Postmenopausal Former Smoker BMI and BSA Data Body Mass Index Body Surface Area  
 16.64 kg/m 2 1.48 m 2 Preferred Pharmacy [facility] Phone [id number] [phone] Your Updated Medication List  
  
   
 This list is accurate as of 2/22/18  2:44 PM.  Always use your most recent med list.  
  
  
  
  
 ALPRAZolam 0.25 mg tablet Commonly known as:  Scott Lopez Take 1 Tab by mouth three (3) times daily as needed for Anxiety. Max Daily Amount: 0.75 mg. Indications: situational anxiety  
  
 amoxicillin 500 mg capsule Commonly known as:  AMOXIL Take 1 Cap by mouth two (2) times a day for 10 days. aspirin delayed-release 81 mg tablet Take 1 Tab by mouth daily. Blood Pressure Test Kit-Wrist Kit Check bp daily and as needed Dx:  Hypertension I10  Indications: hypertension FISH OIL 1,000 mg Cap Generic drug:  omega-3 fatty acids-vitamin e Take  by mouth daily. fluticasone 50 mcg/actuation nasal spray Commonly known as:  Geneva Thayer 2 Sprays by Both Nostrils route daily. lisinopril 20 mg tablet Commonly known as:  Navya Fair Take 1 Tab by mouth daily. loratadine 10 mg tablet Commonly known as:  Benita Humnoke Take 1 Tab by mouth daily. montelukast 10 mg tablet Commonly known as:  SINGULAIR  
TAKE 1 TABLET BY MOUTH EVERY DAY  
  
 MULTIVITAMIN PO Take  by mouth. Takes 3 times/week  
  
 oseltamivir 75 mg capsule Commonly known as:  TAMIFLU Take 1 Cap by mouth two (2) times a day for 5 days. Indications: INFLUENZA  
  
 pantoprazole 40 mg tablet Commonly known as:  PROTONIX Take 1 Tab by mouth daily. Indications: Heartburn  
  
 simvastatin 40 mg tablet Commonly known as:  ZOCOR  
TAKE 1 TABLET BY MOUTH AT BEDTIME  
  
 valACYclovir 500 mg tablet Commonly known as:  VALTREX  
TAKE 1 TABLET BY MOUTH EVERY DAY DURING FLARES  
  
 VITAMIN B-12 1,000 mcg tablet Generic drug:  cyanocobalamin Take 1,000 mcg by mouth daily. Prescriptions Sent to Pharmacy Refills  
 oseltamivir (TAMIFLU) 75 mg capsule 0 Sig: Take 1 Cap by mouth two (2) times a day for 5 days. Indications: INFLUENZA  Class: Normal  
 Pharmacy: [id number] - [place] [address] Ph #: [phone] Route: Oral  
 amoxicillin (AMOXIL) 500 mg capsule 0 Sig: Take 1 Cap by mouth two (2) times a day for 10 days. Class: Normal  
 Pharmacy: [facility] Ph #: [phone] Route: Oral  
  
We Performed the Following AMB POC RAPID INFLUENZA TEST [52787 CPT(R)] AMB POC RAPID STREP A [92928 CPT(R)] Patient Instructions An upper respiratory infection (URI) is an infection of the throat and nose. It is also known as \"the common cold\". 90% of these infections are caused by a virus. Viral infections do not respond to antibiotic treatment, only bacteria are killed by antibiotics. Therefore, supportive treatment is recommended to help with symptoms. Symptoms usually subside after 7-10 days (or longer if you smoke). If symptoms worsen or persist after 14 days, or if you have fever over 101.3, fever for 5 days or more, wheezing, or shortness of breath, please contact the office. To prevent URIs, wash hands frequently (epecially before and after eating - use hand  if you are in a public place.) Eat a healthy diet, get regular exercise and sufficient sleep. Reduce your exposure to cigarette smoke. If you need to cough or sneeze, use the crook of your arm to cover your mouth. Supportive Care 1) Alternate 400mg Ibuprofen and 650mg Tylenol every 4 hours as needed for sinus pain and discomfort. Make sure to take Ibuprofen with food as it can cause stomach upset. Do not exceed 3000 mg Tylenol per day. 2) Take a daily antihistamine to reduce symptoms such as sneezing, runny nose and itchy eyes. You can buy these over the counter (OTC) (no prescription needed) such as Claritin, Allegra or Zyrtec.  Take this daily as it takes 3-4 weeks for the full therapeutic effect to take place. Follow directions on package. If symptoms of sneezing, coughing and runny nose are severe, you can try taking Benadryl at night before bed. However, Benadryl can cause drowsiness, so please use caution. Elderly people should not use Benadryl due to side effects and increase risk of falling. 3) OTC Robitussin or Delsym for cough relief. Follow directions on package. Do not exceed maximum dose. May cause drowsiness. If you have high blood pressure or kidney problems, avoid cough medicines that contain decongestants  such as pseudoephedrine, ephedrine, phenylephrine, naphazoline and oxymetazoline. 4) Use an OTC decongestant nasal spray such as Flonase, Nasonex, or Rhinocort. These contain a steroid and will help reduce congestion. You can spray 2x in each nostril two times a day for 3-5 days. Use the opposite hand of the nostril you are spraying and look down at your toes when you administer the nasal spray. This ensures the spray is applied to the nasal tissue properly. If you use Afrin for nasal congestion, DO NOT use for more than 5 days (due to rebound congestion) Saline nasal spray can be used daily and will help restore moisture to dry nasal passages, wash away allergens and can help prevent inflammation of the mucous membranes. 5) Mucinex (guaifenesin) helps thin mucus and may make it easier to clear it from head, throat and lungs. 6) Increase your fluid consumption, especially water. Eat a well-balanced diet and avoid dairy and sugar as these can increase or thicken congestion. 7) Warm salt water gargle can help alleviate sore throat (1 teaspoon in 8 oz warm water) 8) Honey is a natural cough suppressor - can take a teaspoon of honey for cough or mix with hot tea. Local honey can help inoculate against local pollen that causes allergic reactions.   (It has to be local honey from our area.  You can usually find local honey at [address] 9) Humidify air, especially in bedroom at night, as it may help with nasal and throat dryness. Breathing in steam from a shower may help loosen mucus and soothe an irritated throat. 10) Get plenty of rest! 11) A warm soak in a bath can help ease muscle and body aches. Add 1 cup of epsom salt to water (Dr. Sarah Doss is a good one- at [place] for around $6). 
 
12) Emergen C or [place] - vitamin supplements containing high doses of vitamin C, Vitamin B12 and B6 that can is marketed to help prevent or stop colds (although this has not been confirmed by scientific research) 13) If you were given an antibiotic, throw away your old toothbrush and invest in a new one (or put it in the  for a cycle) after you have been on the antibiotic for 24 hours. You can get reinfected by using a contaminated one! If symptoms persist for more than 10 days or if you have a fever above 102, please call the office. Complications of URI include an infection of the skin around the eye and other infections. Watch for signs of fever, chills, body aches or any areas of red, warm, swollen and tender skin. Call immediately if you notice these signs. Influenza - spread primarily through respiratory droplets (when someone coughs or sneezes near a susceptible person). Most healthy adults may be able to infect others beginning 1 day before symptoms develop and up to 5 to 7 days after becoming sick. Some people, especially young children and people with weakened immune systems, might be able to infect others for an even longer time. Incubation period is 1-4 days after exposure. 1) Please takeTamiflu (oseltamivir) 75 mg two times a day for 5 days, if given a prescription. Tamiflu works by preventing the influenza virus from multiplying and reducing the symptoms of the flu.   It inhibits the virus, but does not kill the viruses already present in the body. Consequently, if there are too many infected cells present in the body, tamiflu won't be effective in stopping the infection. That is why it is important to get tamiflu within the first 48 hours of symptoms. 2) You may alternate Ibuprofen (200-400mg) with Tylenol (325 mg) every 4 hours as needed for pain and discomfort. 3) It is important to stay hydrated. Increase your fluid consumption, especially water. Avoid sodas and alcohol. 4) Eat a well-balanced and avoid dairy and sugar as these can increase or thicken congestion. 5) If you are having sinus drainage or coughing, you can use mucinex (follow directions on box). 6) A warm soak in a bath can help ease muscle and body aches. Add 1 cup of epsom salt to water (Dr. Sarah Malone and [place] is a good one- at [employer] for around $6). 7) Return to work or school when no fever for 24 hours while off of fever reducing medication. Avoid strenuous activity until you are feeling better. 8) Please wash hands frequently and often and use proper hygiene when cooking and toileting. Call the office or come to the emergency room for progressive symptoms of fatigue or lethargy, shortness of breath, chest pains, inability to hold down fluids, stiff neck or other new symptoms. Get flu vaccine every year! Introducing [facility]! Dear Maralyn Cheadle: Thank you for requesting a NPS account. Our records indicate that you already have an active NPS account. You can access your account anytime at [email or web address] Did you know that you can access your hospital and ER discharge instructions at any time in NPS? You can also review all of your test results from your hospital stay or ER visit. Additional Information If you have questions, please visit the Frequently Asked Questions section of the [employer] website at [email or web address] Remember, [employer] is NOT to be used for urgent needs. For medical emergencies, dial 911. Now available from your iPhone and Android! Please provide this summary of care documentation to your next provider. Your primary care clinician is listed as Francisco Valentine. If you have any questions after today's visit, please call [phone].

## 2018-02-26 ENCOUNTER — TELEPHONE (OUTPATIENT)
Dept: FAMILY MEDICINE CLINIC | Age: 63
End: 2018-02-26

## 2018-02-26 NOTE — TELEPHONE ENCOUNTER
Patient says she was seen on 02/22/2018 and had a dry cough. Says she tried otc delsym but it is not working. Says the cough has gotten worse and wants to know if a cough medication can be called into her pharmacy. Her contact # is [phone].

## 2018-02-27 DIAGNOSIS — R05.9 COUGH: Primary | ICD-10-CM

## 2018-02-27 RX ORDER — HYDROCODONE POLISTIREX AND CHLORPHENIRAMINE POLISTIREX 10; 8 MG/5ML; MG/5ML
5 SUSPENSION, EXTENDED RELEASE ORAL
Qty: 115 ML | Refills: 0 | Status: SHIPPED | OUTPATIENT
Start: 2018-02-27 | End: 2018-08-03 | Stop reason: ALTCHOICE

## 2018-03-13 ENCOUNTER — TELEPHONE (OUTPATIENT)
Dept: FAMILY MEDICINE CLINIC | Age: 63
End: 2018-03-13

## 2018-03-13 NOTE — TELEPHONE ENCOUNTER
----- Message from Geraldine Lynch sent at 3/13/2018  2:36 PM EDT -----  Regarding: MAURO Farah/Telephone  Pt is calling to check status of fmla form that was sent 3/5. The best contact is [phone] please leave detailed message whether she needs to re-fax or not.

## 2018-03-15 ENCOUNTER — TELEPHONE (OUTPATIENT)
Dept: FAMILY MEDICINE CLINIC | Age: 63
End: 2018-03-15

## 2018-03-15 NOTE — TELEPHONE ENCOUNTER
----- Message from Neville Chan sent at 3/15/2018  8:40 AM EDT -----  Regarding: Dr. Jaren Joe  Pt is inquiring about the status of her FMLA forms that where given to [employer] that  need to be re faxed because they haven't received them yet.      Best contact number [phone]

## 2018-03-15 NOTE — TELEPHONE ENCOUNTER
Returned phone call to patient  Voicemail to return call. . Patient will be informed that her [facility] papers have been faxed twice and each time a confirmation was gotten. Patient has been informed that papers were faxed over previously.

## 2018-03-19 NOTE — TELEPHONE ENCOUNTER
----- Message from Michael Washington sent at 3/19/2018  3:38 PM EDT -----  Regarding: MAURO Farah/Telephone  Pt is returning a call to Chris regarding fmla forms that are due 3/22. Pt stated notes needed to be more detailed regarding her illness. The best contact is [phone].

## 2018-03-20 NOTE — TELEPHONE ENCOUNTER
Spoke with patient after obtaining 2 patient identifiers  Patient made aware her office notes were faxed over to  [facility] [employer]. Patient verbalized understanding with no further questions at this time.

## 2018-04-23 NOTE — TELEPHONE ENCOUNTER
PCP: Diana Pastor DO    Last appt: Visit date not found  No future appointments.     Requested Prescriptions     Pending Prescriptions Disp Refills    simvastatin (ZOCOR) 40 mg tablet [Pharmacy Med Name: SIMVASTATIN 40 MG TABLET] 90 Tab 0     Sig: TAKE 1 TABLET BY MOUTH AT BEDTIME     Lab Results   Component Value Date/Time    Sodium 142 01/10/2018 01:14 PM    Potassium 3.5 01/10/2018 01:14 PM    Chloride 106 01/10/2018 01:14 PM    CO2 30 01/10/2018 01:14 PM    Anion gap 6 01/10/2018 01:14 PM    Glucose 84 01/10/2018 01:14 PM    BUN 11 01/10/2018 01:14 PM    Creatinine 0.76 01/10/2018 01:14 PM    BUN/Creatinine ratio 14 01/10/2018 01:14 PM    GFR est AA >60 01/10/2018 01:14 PM    GFR est non-AA >60 01/10/2018 01:14 PM    Calcium 8.9 01/10/2018 01:14 PM     Lab Results   Component Value Date/Time    Hemoglobin A1c 5.1 03/21/2017 08:11 AM     Lab Results   Component Value Date/Time    Cholesterol, total 177 03/21/2017 08:11 AM    HDL Cholesterol 71 03/21/2017 08:11 AM    LDL, calculated 94 03/21/2017 08:11 AM    VLDL, calculated 12 03/21/2017 08:11 AM    Triglyceride 58 03/21/2017 08:11 AM    CHOL/HDL Ratio 3.2 10/18/2010 11:28 AM     Lab Results   Component Value Date/Time    TSH 1.480 03/21/2017 08:11 AM

## 2018-04-25 NOTE — TELEPHONE ENCOUNTER
Requested Prescriptions     Pending Prescriptions Disp Refills    simvastatin (ZOCOR) 40 mg tablet [Pharmacy Med Name: SIMVASTATIN 40 MG TABLET] 90 Tab 0     Sig: TAKE 1 TABLET BY MOUTH AT BEDTIME

## 2018-04-26 RX ORDER — SIMVASTATIN 40 MG/1
TABLET, FILM COATED ORAL
Qty: 90 TAB | Refills: 0 | Status: SHIPPED | OUTPATIENT
Start: 2018-04-26 | End: 2018-07-25 | Stop reason: SDUPTHER

## 2018-07-20 DIAGNOSIS — R07.89 OTHER CHEST PAIN: ICD-10-CM

## 2018-07-20 NOTE — TELEPHONE ENCOUNTER
PCP: Atha Bamberger, DO    Last appt: 2/22/2018  Future Appointments  Date Time Provider Epifanio Mtz   8/2/2018 11:30 AM Atha Bamberger, DO [facility]       Requested Prescriptions     Pending Prescriptions Disp Refills    pantoprazole (PROTONIX) 40 mg tablet [Pharmacy Med Name: PANTOPRAZOLE 40MG TABLETS] 90 Tab 0     Sig: TAKE 1 TABLET BY MOUTH EVERY DAY       Prior labs and Blood pressures:  BP Readings from Last 3 Encounters:   02/22/18 114/75   01/24/18 122/82   01/16/18 141/83     Lab Results   Component Value Date/Time    Sodium 142 01/10/2018 01:14 PM    Potassium 3.5 01/10/2018 01:14 PM    Chloride 106 01/10/2018 01:14 PM    CO2 30 01/10/2018 01:14 PM    Anion gap 6 01/10/2018 01:14 PM    Glucose 84 01/10/2018 01:14 PM    BUN 11 01/10/2018 01:14 PM    Creatinine 0.76 01/10/2018 01:14 PM    BUN/Creatinine ratio 14 01/10/2018 01:14 PM    GFR est AA >60 01/10/2018 01:14 PM    GFR est non-AA >60 01/10/2018 01:14 PM    Calcium 8.9 01/10/2018 01:14 PM     Lab Results   Component Value Date/Time    Hemoglobin A1c 5.1 03/21/2017 08:11 AM     Lab Results   Component Value Date/Time    Cholesterol, total 177 03/21/2017 08:11 AM    HDL Cholesterol 71 03/21/2017 08:11 AM    LDL, calculated 94 03/21/2017 08:11 AM    VLDL, calculated 12 03/21/2017 08:11 AM    Triglyceride 58 03/21/2017 08:11 AM    CHOL/HDL Ratio 3.2 10/18/2010 11:28 AM     Lab Results   Component Value Date/Time    Vitamin D 25-Hydroxy 15.1 (L) 04/11/2011 09:00 AM    VITAMIN D, 25-HYDROXY 26.2 (L) 03/21/2017 08:11 AM       Lab Results   Component Value Date/Time    TSH 1.480 03/21/2017 08:11 AM

## 2018-07-25 NOTE — TELEPHONE ENCOUNTER
PCP: Radha Carney DO    Last appt: Visit date not found  Future Appointments  Date Time Provider Epifanio Mtz   8/2/2018 11:30 AM Radha Carney DO [facility]       Requested Prescriptions     Pending Prescriptions Disp Refills    simvastatin (ZOCOR) 40 mg tablet [Pharmacy Med Name: SIMVASTATIN 40 MG TABLET] 90 Tab 0     Sig: TAKE 1 TABLET BY MOUTH AT BEDTIME       Prior labs and Blood pressures:  BP Readings from Last 3 Encounters:   02/22/18 114/75   01/24/18 122/82   01/16/18 141/83     Lab Results   Component Value Date/Time    Sodium 142 01/10/2018 01:14 PM    Potassium 3.5 01/10/2018 01:14 PM    Chloride 106 01/10/2018 01:14 PM    CO2 30 01/10/2018 01:14 PM    Anion gap 6 01/10/2018 01:14 PM    Glucose 84 01/10/2018 01:14 PM    BUN 11 01/10/2018 01:14 PM    Creatinine 0.76 01/10/2018 01:14 PM    BUN/Creatinine ratio 14 01/10/2018 01:14 PM    GFR est AA >60 01/10/2018 01:14 PM    GFR est non-AA >60 01/10/2018 01:14 PM    Calcium 8.9 01/10/2018 01:14 PM     Lab Results   Component Value Date/Time    Hemoglobin A1c 5.1 03/21/2017 08:11 AM     Lab Results   Component Value Date/Time    Cholesterol, total 177 03/21/2017 08:11 AM    HDL Cholesterol 71 03/21/2017 08:11 AM    LDL, calculated 94 03/21/2017 08:11 AM    VLDL, calculated 12 03/21/2017 08:11 AM    Triglyceride 58 03/21/2017 08:11 AM    CHOL/HDL Ratio 3.2 10/18/2010 11:28 AM     Lab Results   Component Value Date/Time    Vitamin D 25-Hydroxy 15.1 (L) 04/11/2011 09:00 AM    VITAMIN D, 25-HYDROXY 26.2 (L) 03/21/2017 08:11 AM       Lab Results   Component Value Date/Time    TSH 1.480 03/21/2017 08:11 AM

## 2018-07-27 RX ORDER — PANTOPRAZOLE SODIUM 40 MG/1
TABLET, DELAYED RELEASE ORAL
Qty: 90 TAB | Refills: 2 | Status: SHIPPED | OUTPATIENT
Start: 2018-07-27 | End: 2019-08-09 | Stop reason: ALTCHOICE

## 2018-07-27 RX ORDER — SIMVASTATIN 40 MG/1
TABLET, FILM COATED ORAL
Qty: 90 TAB | Refills: 0 | Status: SHIPPED | OUTPATIENT
Start: 2018-07-27 | End: 2018-08-03 | Stop reason: SDUPTHER

## 2018-08-03 ENCOUNTER — OFFICE VISIT (OUTPATIENT)
Dept: FAMILY MEDICINE CLINIC | Age: 63
End: 2018-08-03

## 2018-08-03 VITALS
TEMPERATURE: 97.9 F | DIASTOLIC BLOOD PRESSURE: 73 MMHG | SYSTOLIC BLOOD PRESSURE: 114 MMHG | OXYGEN SATURATION: 98 % | WEIGHT: 108.2 LBS | RESPIRATION RATE: 15 BRPM | BODY MASS INDEX: 17.39 KG/M2 | HEIGHT: 66 IN | HEART RATE: 67 BPM

## 2018-08-03 DIAGNOSIS — Z82.3 FAMILY HISTORY OF STROKE: ICD-10-CM

## 2018-08-03 DIAGNOSIS — Z82.0 FAMILY HISTORY OF ALZHEIMER'S DISEASE: ICD-10-CM

## 2018-08-03 DIAGNOSIS — R12 HEARTBURN: ICD-10-CM

## 2018-08-03 DIAGNOSIS — M85.88 OSTEOPENIA OF OTHER SITE: ICD-10-CM

## 2018-08-03 DIAGNOSIS — Z80.3 FAMILY HISTORY OF BREAST CANCER: ICD-10-CM

## 2018-08-03 DIAGNOSIS — Z82.49 FAMILY HISTORY OF HEART ATTACK: ICD-10-CM

## 2018-08-03 DIAGNOSIS — J02.0 STREP PHARYNGITIS: ICD-10-CM

## 2018-08-03 DIAGNOSIS — M81.0 POSTMENOPAUSAL BONE LOSS: ICD-10-CM

## 2018-08-03 DIAGNOSIS — E78.5 DYSLIPIDEMIA: ICD-10-CM

## 2018-08-03 DIAGNOSIS — E55.9 VITAMIN D DEFICIENCY: ICD-10-CM

## 2018-08-03 DIAGNOSIS — H61.22 HEARING LOSS DUE TO CERUMEN IMPACTION, LEFT: ICD-10-CM

## 2018-08-03 DIAGNOSIS — R07.89 OTHER CHEST PAIN: ICD-10-CM

## 2018-08-03 DIAGNOSIS — I10 ESSENTIAL HYPERTENSION WITH GOAL BLOOD PRESSURE LESS THAN 140/90: Primary | ICD-10-CM

## 2018-08-03 DIAGNOSIS — Z80.0 FAMILY HISTORY OF COLON CANCER: ICD-10-CM

## 2018-08-03 DIAGNOSIS — Z84.1 FAMILY HISTORY OF KIDNEY DISEASE: ICD-10-CM

## 2018-08-03 NOTE — ACP (ADVANCE CARE PLANNING)
Re-discussed ACP with patient. Gave her another Right to [facility]. She prefers to discuss it with her . Declines referral to [facility] team at this time. Patient will bring document to her next office visit.

## 2018-08-03 NOTE — MR AVS SNAPSHOT
[address] [place] [address] 
[address] [facility] [address] 
[phone] Patient: Lila Majano MRN:  [id number] Visit Information Date & Time Provider Department Dept. Phone Encounter #  
 8/3/2018 10:00 AM DO Angie Marks [phone] Follow-up Instructions Return in about 6 months (around 2/3/2019) for blood pressure, referral follow up. Upcoming Health Maintenance Date Due Influenza Age 5 to Adult 9/28/2018* BREAST CANCER SCRN MAMMOGRAM 3/28/2019 PAP AKA CERVICAL CYTOLOGY 3/28/2020 COLONOSCOPY 9/19/2026 DTaP/Tdap/Td series (2 - Td) 3/28/2027 *Topic was postponed. The date shown is not the original due date. Allergies as of 8/3/2018  Review Complete On: 8/3/2018 By: Carmen Srivastava DO Severity Noted Reaction Type Reactions Prempro [Conj Estrog-medroxyprogest Ace]  08/08/2011    Other (comments) Benign Right breast mass. Current Immunizations  Reviewed on 8/3/2018 Name Date Influenza Vaccine 10/6/2017, 10/1/2016, 10/1/2014 Influenza Vaccine Split 10/21/2011, 10/18/2010 TD Vaccine 8/31/2004 Tdap 3/28/2017 Reviewed by Carmen Srivastava DO on 8/3/2018 at 10:46 AM  
 Reviewed by Carmen Sriavstava DO on 8/3/2018 at 10:46 AM  
You Were Diagnosed With   
  
 Codes Comments Essential hypertension with goal blood pressure less than 140/90    -  Primary ICD-10-CM: I10 
ICD-9-CM: 401.9 stable Dyslipidemia     ICD-10-CM: E78.5 ICD-9-CM: 272.4 Osteopenia of other site     ICD-10-CM: M85.88 ICD-9-CM: 733.90 Vitamin D deficiency     ICD-10-CM: E55.9 ICD-9-CM: 268.9 Heartburn     ICD-10-CM: R12 
ICD-9-CM: 787.1 resolved with Protonix Other chest pain     ICD-10-CM: R07.89 ICD-9-CM: 786.59 due to heartburn vs other, resolved with Protonix Family history of Alzheimer's disease     ICD-10-CM: Z82.0 ICD-9-CM: V17.2 Family history of stroke     ICD-10-CM: Z82.3 ICD-9-CM: V17.1 Family history of heart attack     ICD-10-CM: Z82.49 
ICD-9-CM: V17.3 Family history of kidney disease     ICD-10-CM: Z84.1 ICD-9-CM: V18.69 Family history of colon cancer     ICD-10-CM: Z80.0 ICD-9-CM: V16.0 Family history of breast cancer     ICD-10-CM: Z80.3 ICD-9-CM: V16.3 Strep pharyngitis     ICD-10-CM: J02.0 ICD-9-CM: 034.0 resolved after antibiotics Postmenopausal bone loss     ICD-10-CM: M81.0 ICD-9-CM: 733.01 Ear build-up, left     ICD-10-CM: H61.22 
ICD-9-CM: 380.4 Vitals BP Pulse Temp Resp Height(growth percentile) 114/73 (BP 1 Location: Right arm, BP Patient Position: Sitting) 67 97.9 °F (36.6 °C) (Temporal) 15 5' 6\" (1.676 m) Weight(growth percentile) SpO2 BMI OB Status Smoking Status 108 lb 3.2 oz (49.1 kg) 98% 17.46 kg/m2 Postmenopausal Former Smoker Vitals History BMI and BSA Data Body Mass Index Body Surface Area  
 17.46 kg/m 2 1.51 m 2 Preferred Pharmacy Pharm[facility] Name Phone [id number] [phone] Your Updated Medication List  
  
   
This list is accurate as of 8/3/18 11:15 AM.  Always use your most recent med list.  
  
  
  
  
 ALPRAZolam 0.25 mg tablet Commonly known as:  Pixie Oz Take 1 Tab by mouth three (3) times daily as needed for Anxiety. Max Daily Amount: 0.75 mg. Indications: situational anxiety  
  
 aspirin delayed-release 81 mg tablet Take 1 Tab by mouth daily. Blood Pressure Test Kit-Wrist Kit Check bp daily and as needed Dx:  Hypertension I10  Indications: hypertension FISH OIL 1,000 mg Cap Generic drug:  omega-3 fatty acids-vitamin e Take  by mouth daily. fluticasone 50 mcg/actuation nasal spray Commonly known as:  [place] 2 Sprays by Both Nostrils route daily. lisinopril 20 mg tablet Commonly known as:  Lynda Watkins Take 1 Tab by mouth daily. loratadine 10 mg tablet Commonly known as:  Nkechi Nguyen Take 1 Tab by mouth daily. montelukast 10 mg tablet Commonly known as:  SINGULAIR  
TAKE 1 TABLET BY MOUTH EVERY DAY  
  
 MULTIVITAMIN PO Take  by mouth. Takes 3 times/week  
  
 pantoprazole 40 mg tablet Commonly known as:  PROTONIX  
TAKE 1 TABLET BY MOUTH EVERY DAY  
  
 simvastatin 40 mg tablet Commonly known as:  ZOCOR  
TAKE 1 TABLET BY MOUTH AT BEDTIME  
  
 valACYclovir 500 mg tablet Commonly known as:  VALTREX  
TAKE 1 TABLET BY MOUTH EVERY DAY DURING FLARES  
  
 VITAMIN B-12 1,000 mcg tablet Generic drug:  cyanocobalamin Take 1,000 mcg by mouth daily. We Performed the Following LIPID PANEL [13882 CPT(R)] METABOLIC PANEL, COMPREHENSIVE [04892 CPT(R)] MICROALBUMIN, UR, RAND W/ MICROALB/CREAT RATIO [id number] CPT(R)] TSH 3RD GENERATION [98487 CPT(R)] URINALYSIS W/ RFLX MICROSCOPIC [09949 CPT(R)] VITAMIN D, 25 HYDROXY [id number] CPT(R)] Follow-up Instructions Return in about 6 months (around 2/3/2019) for blood pressure, referral follow up. To-Do List   
 08/03/2018 Imaging:  DEXA BONE DENSITY STUDY AXIAL Patient Instructions Healthy Upper Back: Exercises Your Care Instructions Here are some examples of exercises for your upper back. Start each exercise slowly. Ease off the exercise if you start to have pain. Your doctor or physical therapist will tell you when you can start these exercises and which ones will work best for you. How to do the exercises Lower neck and upper back stretch 1. Stretch your arms out in front of your body. Clasp one hand on top of your other hand. 2. Gently reach out so that you feel your shoulder blades stretching away from each other. 3. Gently bend your head forward. 4. Hold for 15 to 30 seconds. 5. Repeat 2 to 4 times. Midback stretch If you have knee pain, do not do this exercise. 1. Kneel on the floor, and sit back on your ankles. 2. Lean forward, place your hands on the floor, and stretch your arms out in front of you. Rest your head between your arms. 3. Gently push your chest toward the floor, reaching as far in front of you as possible. 4. Hold for 15 to 30 seconds. 5. Repeat 2 to 4 times. Shoulder rolls 1. Sit comfortably with your feet shoulder-width apart. You can also do this exercise while standing. 2. Roll your shoulders up, then back, and then down in a smooth, circular motion. 3. Repeat 2 to 4 times. Wall push-up 1. Stand against a wall with your feet about 12 to 24 inches back from the wall. If you feel any pain when you do this exercise, stand closer to the wall. 2. Place your hands on the wall slightly wider apart than your shoulders, and lean forward. 3. Gently lean your body toward the wall. Then push back to your starting position. Keep the motion smooth and controlled. 4. Repeat 8 to 12 times. Resisted shoulder blade squeeze For this exercise, you will need elastic exercise material, such as surgical tubing or Thera-Band. 1. Sit or stand, holding the band in both hands in front of you. Keep your elbows close to your sides, bent at a 90-degree angle. Your palms should face up. 2. Squeeze your shoulder blades together, and move your arms to the outside, stretching the band. Be sure to keep your elbows at your sides while you do this. 3. Relax. 4. Repeat 8 to 12 times. Resisted rows For this exercise, you will need elastic exercise material, such as surgical tubing or Thera-Band. 1. Put the band around a solid object, such as a bedpost, at about waist level. Hold one end of the band in each hand. 2. With your elbows at your sides and bent to 90 degrees, pull the band back to move your shoulder blades toward each other. Return to the starting position. 3. Repeat 8 to 12 times. Follow-up care is a key part of your treatment and safety. Be sure to make and go to all appointments, and call your doctor if you are having problems. It's also a good idea to know your test results and keep a list of the medicines you take. Where can you learn more? Go to http://marlo-estelita.info/. Enter X336 in the search box to learn more about \"Healthy Upper Back: Exercises. \" Current as of: November 29, 2017 Content Version: 11.7 © 9162-3345 [employer]. Care instructions adapted under license by [employer] (which disclaims liability or warranty for this information). If you have questions about a medical condition or this instruction, always ask your healthcare professional. [address] any warranty or liability for your use of this information. Introducing [facility]! Dear Vijaya Garcia: Thank you for requesting a [employer] account. Our records indicate that you already have an active [employer] account. You can access your account anytime at [email or web address] Did you know that you can access your hospital and ER discharge instructions at any time in [employer]? You can also review all of your test results from your hospital stay or ER visit. Additional Information If you have questions, please visit the Frequently Asked Questions section of the [employer] website at [email or web address] Remember, [employer] is NOT to be used for urgent needs. For medical emergencies, dial 911. Now available from your iPhone and Android! Please provide this summary of care documentation to your next provider. Your primary care clinician is listed as Jose Guadalupe Chris. If you have any questions after today's visit, please call [phone].

## 2018-08-03 NOTE — PATIENT INSTRUCTIONS
Healthy Upper Back: Exercises Your Care Instructions Here are some examples of exercises for your upper back. Start each exercise slowly. Ease off the exercise if you start to have pain. Your doctor or physical therapist will tell you when you can start these exercises and which ones will work best for you. How to do the exercises Lower neck and upper back stretch 1. Stretch your arms out in front of your body. Clasp one hand on top of your other hand. 2. Gently reach out so that you feel your shoulder blades stretching away from each other. 3. Gently bend your head forward. 4. Hold for 15 to 30 seconds. 5. Repeat 2 to 4 times. Midback stretch If you have knee pain, do not do this exercise. 1. Kneel on the floor, and sit back on your ankles. 2. Lean forward, place your hands on the floor, and stretch your arms out in front of you. Rest your head between your arms. 3. Gently push your chest toward the floor, reaching as far in front of you as possible. 4. Hold for 15 to 30 seconds. 5. Repeat 2 to 4 times. Shoulder rolls 1. Sit comfortably with your feet shoulder-width apart. You can also do this exercise while standing. 2. Roll your shoulders up, then back, and then down in a smooth, circular motion. 3. Repeat 2 to 4 times. Wall push-up 1. Stand against a wall with your feet about 12 to 24 inches back from the wall. If you feel any pain when you do this exercise, stand closer to the wall. 2. Place your hands on the wall slightly wider apart than your shoulders, and lean forward. 3. Gently lean your body toward the wall. Then push back to your starting position. Keep the motion smooth and controlled. 4. Repeat 8 to 12 times. Resisted shoulder blade squeeze For this exercise, you will need elastic exercise material, such as surgical tubing or Thera-Band. 1. Sit or stand, holding the band in both hands in front of you.  Keep your elbows close to your sides, bent at a 90-degree angle. Your palms should face up. 2. Squeeze your shoulder blades together, and move your arms to the outside, stretching the band. Be sure to keep your elbows at your sides while you do this. 3. Relax. 4. Repeat 8 to 12 times. Resisted rows For this exercise, you will need elastic exercise material, such as surgical tubing or Thera-Band. 1. Put the band around a solid object, such as a bedpost, at about waist level. Hold one end of the band in each hand. 2. With your elbows at your sides and bent to 90 degrees, pull the band back to move your shoulder blades toward each other. Return to the starting position. 3. Repeat 8 to 12 times. Follow-up care is a key part of your treatment and safety. Be sure to make and go to all appointments, and call your doctor if you are having problems. It's also a good idea to know your test results and keep a list of the medicines you take. Where can you learn more? Go to http://marlo-estelita.info/. Enter O939 in the search box to learn more about \"Healthy Upper Back: Exercises. \" Current as of: November 29, 2017 Content Version: 11.7 © 9646-8665 [employer]. Care instructions adapted under license by [employer] (which disclaims liability or warranty for this information). If you have questions about a medical condition or this instruction, always ask your healthcare professional. [place] [address] any warranty or liability for your use of this information.

## 2018-08-03 NOTE — PROGRESS NOTES
Garrett Valdez  Identified pt with two pt identifiers(name and ). Chief Complaint Patient presents with  Blood Pressure Check  Referral Follow Up Cardiology 1. Have you been to the ER, urgent care clinic since your last visit? Hospitalized since your last visit? No 
 
2. Have you seen or consulted any other health care providers outside of the [facility] since your last visit? Include any pap smears or colon screening. No 
 
In the event something were to happen to you and you were unable to speak on your behalf, do you have an Advance Directive/ Living Will in place stating your wishes? NO If yes, do we have a copy on file NO If no, would you like information:     
 
 
Medication reconciliation up to date and corrected with patient at this time. Today's provider has been notified of reason for visit, vitals and flowsheets obtained on patients. Reviewed record in preparation for visit, huddled with provider and have obtained necessary documentation. There are no preventive care reminders to display for this patient. Wt Readings from Last 3 Encounters:  
18 108 lb 3.2 oz (49.1 kg) 18 103 lb 1.6 oz (46.8 kg) 18 104 lb (47.2 kg) Temp Readings from Last 3 Encounters:  
18 100.3 °F (37.9 °C) (Oral) 18 98.1 °F (36.7 °C) (Oral) 01/10/18 98.2 °F (36.8 °C) BP Readings from Last 3 Encounters:  
18 114/75  
18 122/82  
18 141/83 Pulse Readings from Last 3 Encounters:  
18 (!) 103  
18 67  
18 (!) 55 Vitals:  
 18 1014 BP: 114/73 Pulse: 67 Resp: 15 Temp: 97.9 °F (36.6 °C) TempSrc: Temporal  
SpO2: 98% Weight: 108 lb 3.2 oz (49.1 kg) Height: 5' 6\" (1.676 m) PainSc:   0 - No pain Learning Assessment: 
:  
 
Learning Assessment 2/3/2014 PRIMARY LEARNER Patient HIGHEST LEVEL OF EDUCATION - PRIMARY LEARNER  GRADUATED HIGH SCHOOL OR GED  
BARRIERS PRIMARY LEARNER NONE  
CO-LEARNER CAREGIVER No  
CO-LEARNER NAME n/a PRIMARY LANGUAGE ENGLISH  
LEARNER PREFERENCE PRIMARY DEMONSTRATION  
LEARNING SPECIAL TOPICS no  
ANSWERED BY Patient RELATIONSHIP SELF Depression Screening: 
:  
 
PHQ over the last two weeks 2/22/2018 Little interest or pleasure in doing things Not at all Feeling down, depressed, irritable, or hopeless Not at all Total Score PHQ 2 0 Fall Risk Assessment: 
:  
 
Fall Risk Assessment, last 12 mths 8/3/2018 Able to walk? Yes Fall in past 12 months? No  
 
 
 
ADL Screening: 
:  
 

## 2018-08-03 NOTE — PROGRESS NOTES
HISTORY OF PRESENT ILLNESS Solomon Thacker is a 58 y.o. female presents with Blood Pressure Check and Referral Follow Up (Cardiology) Agree with nurse note. Pt with hypertension, dyslipidemia, vit d deficiency, and family hx of Alzheimer's disease, stroke, and kidney disease presents to the office with a BP of 114/73. For BP, she takes Lisinopril 20 mg daily. She uses Aspirin 81 mg daily, tolerating well. For cholesterol, she uses Zocor 40 mg daily, tolerating well. Weight is 108 lbs, up 5 lbs since 2/2018. She has a new abdominal fold, which she calls a \"hangover\". She is now walking twice a day instead of once a day to get rid of it. Pt with family hx of heart attack. Pt saw cardiologist, Dr. Barron Lee for heart palpations. She denied chest pain but has occasional SOB. Occasional chest discomfort associated with food but has not recurred since restarting Protonix medication. Dr. Kyra Aguiar ordered a holter monitor to evaluate for possible arrhythmia. Since chest pain is consitent with GI etiology, encourage pt to exercise. Dr. Kyra Aguiar would consider stress test if she expereiences sxs with exercise. Holter monitor results were normal and she has had no recurrence of chest pain or heart palpations. She saw NP Blanco Serrano on 2/22/2018 for fever and sore throat. Flu test was negative, but rx'd Tamiflu 75 mg BID x5 days. Strep test positive, rx'd Amoxicillin 500 mg BID x10 days. Resolved. Pt has been having L ear popping and requests to have it irrigated today. Health Maintenance Pt with family hx of colon ca. Pt's most recent colonoscopy was on 9/16/2016 with Dr. Scooby Jurado. F/U 10 years. Pt had eye exam in 11/2017 and saw Dr. Annabella Goode. Pt with family hx of breast ca. Pt's most recent mammogram was on 10/30/2017 with [facility]. F/U 1 year. DEXA in 2014 showed osteopenia. Written by shawna Metcalf, as dictated by Dr. Carmen Leahy DO. 
 
ROS Review of Systems negative except as noted above in HPI. ALLERGIES:   
Allergies Allergen Reactions  Prempro [Conj Estrog-Medroxyprogest Ace] Other (comments) Benign Right breast mass. CURRENT MEDICATIONS:   
Outpatient Prescriptions Marked as Taking for the 8/3/18 encounter (Office Visit) with Micheal Ballard DO Medication Sig Dispense Refill  pantoprazole (PROTONIX) 40 mg tablet TAKE 1 TABLET BY MOUTH EVERY DAY 90 Tab 2  
 montelukast (SINGULAIR) 10 mg tablet TAKE 1 TABLET BY MOUTH EVERY DAY 90 Tab 3  
 lisinopril (PRINIVIL, ZESTRIL) 20 mg tablet Take 1 Tab by mouth daily. 90 Tab 3  
 valACYclovir (VALTREX) 500 mg tablet TAKE 1 TABLET BY MOUTH EVERY DAY DURING FLARES 90 Tab 3  ALPRAZolam (XANAX) 0.25 mg tablet Take 1 Tab by mouth three (3) times daily as needed for Anxiety. Max Daily Amount: 0.75 mg. Indications: situational anxiety 15 Tab 1  
 aspirin delayed-release 81 mg tablet Take 1 Tab by mouth daily. 90 Tab 3  
 loratadine (CLARITIN) 10 mg tablet Take 1 Tab by mouth daily. 15 Tab 0  
 fluticasone (FLONASE) 50 mcg/actuation nasal spray 2 Sprays by Both Nostrils route daily. 1 Bottle 0  
 simvastatin (ZOCOR) 40 mg tablet TAKE 1 TABLET BY MOUTH AT BEDTIME 90 tablet 3  cyanocobalamin (VITAMIN B-12) 1,000 mcg tablet Take 1,000 mcg by mouth daily.  omega-3 fatty acids-vitamin e (FISH OIL) 1,000 mg Cap Take  by mouth daily.  MULTIVITAMINS (MULTIVITAMIN PO) Take  by mouth. Takes 3 times/week PAST MEDICAL HISTORY:   
Past Medical History:  
Diagnosis Date  Allergic rhinitis, cause unspecified  Arthritis   
 fingers and toes  Breast mass 06/1999 Right. Benign. due to Prempro. Dr. Danial Khan  Chickenpox childhood  Dizziness 10/2011 Dr. Ramonita Hernandez.  EBV positive mononucleosis syndrome 10/2010  
 positive titer.  Exposure to hepatitis B   
   Genital HSV  HA (headache)  Heart palpitations 05/21/09, 01/2018   Tayla Mace  
 Hypoglycemia  Menopausal and postmenopausal disorder 1999  MRSA infection 12/2011 Right hip. Txd Bactrim.  Osteopenia Dr. Kieran Agudelo. Dr. Vannessa Quevedo.  Other and unspecified hyperlipidemia  Raynaud phenomenon  RLS (restless legs syndrome) 2008  Shingles teenager R arm  Vitamin D deficiency 2008 PAST SURGICAL HISTORY:   
Past Surgical History:  
Procedure Laterality Date  COLONOSCOPY  09/19/2016 Dr. Frank Suarez. due q 10 yrs. Mac Juan Alberto Benign. Dr. Adrianne Padilla  HX ORTHOPAEDIC Right 10/2012 Right arm. BENIGN TUMOR REMOVED. Dr. Mendez Nuno FAMILY HISTORY:   
Family History Problem Relation Age of Onset  Other Mother   
  gallstones  Breast Cancer Mother 52  
  unilateral.  
[place] Mother 62 Breast Cancer  Hypertension Father  Stroke Father 70  
  x2 CVAs and several TIAs  Kidney Disease Father  Asthma Paternal Uncle   
  x 2  
 Stroke Paternal Aunt  Diabetes Paternal Aunt  Heart Attack Paternal Aunt  Other Paternal Aunt   
  gout  Diabetes Paternal Aunt  Cancer Maternal Grandmother   
  uterine  Colon Cancer Maternal Grandmother 79  
 Cancer Maternal Aunt   
  lung  Cancer Other   
  maternal cousins  Breast Cancer Other 35  
  x 2.  bilaterally.  Alzheimer Maternal Aunt   
  x 2 SOCIAL HISTORY:   
Social History Social History  Marital status:  Spouse name: N/A  
 Number of children: N/A  
 Years of education: N/A Social History Main Topics  Smoking status: Former Smoker Packs/day: 0.50 Years: 10.00 Types: Cigarettes Quit date: 1/1/1985  Smokeless tobacco: Never Used  Alcohol use 1.5 oz/week 3 Standard drinks or equivalent per week Comment: Occasional  
 Drug use: No  
 Sexual activity: Yes  
  Partners: Male Birth control/ protection: None Other Topics Concern  None Social History Narrative IMMUNIZATIONS:   
Immunization History Administered Date(s) Administered  Influenza Vaccine 10/01/2014, 10/01/2016, 10/06/2017  Influenza Vaccine Split 10/18/2010, 10/21/2011  TD Vaccine 08/31/2004  Tdap 03/28/2017 PHYSICAL EXAMINATION Vital Signs Visit Vitals  /73 (BP 1 Location: Right arm, BP Patient Position: Sitting)  Pulse 67  Temp 97.9 °F (36.6 °C) (Temporal)  Resp 15  Ht 5' 6\" (1.676 m)  Wt 108 lb 3.2 oz (49.1 kg)  SpO2 98%  BMI 17.46 kg/m2 Weight Metrics 8/3/2018 2/22/2018 1/24/2018 1/16/2018 1/10/2018 10/16/2017 6/28/2017 Weight 108 lb 3.2 oz 103 lb 1.6 oz 104 lb 102 lb 14.4 oz 105 lb 6.1 oz 103 lb 6.4 oz 105 lb BMI 17.46 kg/m2 16.64 kg/m2 16.79 kg/m2 16.61 kg/m2 17.01 kg/m2 16.7 kg/m2 16.96 kg/m2 General appearance - Well nourished. Well appearing. Well developed. No acute distress. Head - Normocephalic. Atraumatic. Eyes - pupils equal and reactive. Extraocular eye movements intact. Sclera anicteric. Mildly injected sclera. Ears - Hearing is grossly normal bilaterally. 90% of L EAC occluded with brown cerebrum. Nose - normal and patent. No polyps noted. No erythema. No discharge. Mouth - mucous membranes with decreased oral moisture. Posterior pharynx normal with cobblestone appearance. No erythema, white exudate or obstruction. Neck - supple. Midline trachea. No carotid bruits noted bilaterally. No thyromegaly noted. Chest - clear to auscultation bilaterally anteriorly and posteriorly. No wheezes. No rales or rhonchi. Breath sounds are symmetrical bilaterally. Unlabored respirations. Heart - normal rate. Regular rhythm. Normal S1, S2. No murmur noted. No rubs, clicks or gallops noted. Abdomen - soft and nondistended. No masses or organomegaly. No rebound, rigidity or guarding. Bowel sounds normal x 4 quadrants. No tenderness noted.  
Neurological - awake, alert and oriented to person, place, and time and event. Cranial nerves II through XII intact. Clear speech. Muscle strength is +5/5 x 4 extremities. Sensation is intact to light touch bilaterally. Steady gait. Heme/Lymph - peripheral pulses normal x 4 extremities. No peripheral edema is noted. Musculoskeletal - Intact x 4 extremities. Full ROM x 4 extremities. No pain with movement. Back exam - normal range of motion. No pain on palpation of the spinous processes in the cervical, thoracic, lumbar, sacral regions. No CVA tenderness. Mild thoracic kyphosis. Skin - no rashes, erythema, ecchymosis, lacerations, abrasions, suspicious moles noted Psychological -   normal behavior, dress and thought processes. Good insight. Good eye contact. Normal affect. Appropriate mood. Normal speech. DATA REVIEWED Lab Results Component Value Date/Time WBC 5.2 01/10/2018 01:14 PM  
 WBC 3.8 (L) 05/29/2012 09:00 AM  
 HGB 12.4 01/10/2018 01:14 PM  
 HCT 37.3 01/10/2018 01:14 PM  
 PLATELET 111 28/90/5557 01:14 PM  
 MCV 97.9 01/10/2018 01:14 PM  
 
Lab Results Component Value Date/Time Sodium 142 01/10/2018 01:14 PM  
 Potassium 3.5 01/10/2018 01:14 PM  
 Chloride 106 01/10/2018 01:14 PM  
 CO2 30 01/10/2018 01:14 PM  
 Anion gap 6 01/10/2018 01:14 PM  
 Glucose 84 01/10/2018 01:14 PM  
 BUN 11 01/10/2018 01:14 PM  
 Creatinine 0.76 01/10/2018 01:14 PM  
 BUN/Creatinine ratio 14 01/10/2018 01:14 PM  
 GFR est AA >60 01/10/2018 01:14 PM  
 GFR est non-AA >60 01/10/2018 01:14 PM  
 Calcium 8.9 01/10/2018 01:14 PM  
 Bilirubin, total 0.5 01/10/2018 01:14 PM  
 AST (SGOT) 14 (L) 01/10/2018 01:14 PM  
 Alk. phosphatase 80 01/10/2018 01:14 PM  
 Protein, total 7.4 01/10/2018 01:14 PM  
 Albumin 4.0 01/10/2018 01:14 PM  
 Globulin 3.4 01/10/2018 01:14 PM  
 A-G Ratio 1.2 01/10/2018 01:14 PM  
 ALT (SGPT) 26 01/10/2018 01:14 PM  
 
Lab Results Component Value Date/Time  Cholesterol, total 177 03/21/2017 08:11 AM  
 HDL Cholesterol 71 03/21/2017 08:11 AM  
 LDL, calculated 94 03/21/2017 08:11 AM  
 VLDL, calculated 12 03/21/2017 08:11 AM  
 Triglyceride 58 03/21/2017 08:11 AM  
 CHOL/HDL Ratio 3.2 10/18/2010 11:28 AM  
 
Lab Results Component Value Date/Time Vitamin D 25-Hydroxy 15.1 (L) 04/11/2011 09:00 AM  
 VITAMIN D, 25-HYDROXY 26.2 (L) 03/21/2017 08:11 AM  
   
Lab Results Component Value Date/Time Hemoglobin A1c 5.1 03/21/2017 08:11 AM  
 
Lab Results Component Value Date/Time TSH 1.480 03/21/2017 08:11 AM  
 
 
Lab Results Component Value Date/Time Microalb/Creat ratio (ug/mg creat.) <5.4 03/22/2017 10:34 AM  
 
 
 
ASSESSMENT and PLAN 
 
  ICD-10-CM ICD-9-CM 1. Essential hypertension with goal blood pressure less than 140/90 I10 401.9 LIPID PANEL  
   METABOLIC PANEL, COMPREHENSIVE  
   TSH 3RD GENERATION  
   MICROALBUMIN, UR, RAND W/ MICROALB/CREAT RATIO  
   URINALYSIS W/ RFLX MICROSCOPIC  
 stable 2. Dyslipidemia E78.5 272.4 LIPID PANEL  
   METABOLIC PANEL, COMPREHENSIVE  
   TSH 3RD GENERATION 3. Osteopenia of other site M85.88 733.90 DEXA BONE DENSITY STUDY AXIAL 4. Hearing loss due to cerumen impaction, left H61.22 389.8 GA REMOVAL IMPACTED CERUMEN IRRIGATION/LVG UNILAT  
  380.4 5. Vitamin D deficiency E55.9 268.9 VITAMIN D, 25 HYDROXY 6. Heartburn R12 787.1 resolved with Protonix 7. Other chest pain R07.89 786.59   
 due to heartburn vs other, resolved with Protonix 8. Family history of Alzheimer's disease Z82.0 V17.2 9. Family history of stroke Z82.3 V17.1 10. Family history of heart attack Z82.49 V17.3 11. Family history of kidney disease Z84.1 V18.69   
12. Family history of colon cancer Z80.0 V16.0 13. Family history of breast cancer Z80.3 V16.3 14. Strep pharyngitis J02.0 034.0   
 resolved after antibiotics 15. Postmenopausal bone loss M81.0 733.01 DEXA BONE DENSITY STUDY AXIAL Chart reviewed and updated.    
 
PROCEDURE DISCUSSED WITH PATIENT, VERBAL CONSENT RECEIVED. PERFORMED BILATERAL EAR IRRIGATION WITHOUT COMPLICATIONS. PT TOLERATED PROCEDURE WELL. Continue current medications and care. Avoid heart burn triggers. Recheck pertinent fasting labs. Recent office visit notes from Dr. Rashi Hurtado reviewed. Get recent office visit notes from Dr. Meenakshi Bustillo. Advised pt to sign release. DEXA ordered. Counseled patient on health concerns:  Cholesterol, hypertension, heartburn, chest pain, and vit d deficiency. Relevant handouts given and discussed with patient. Immunizations noted; Advise flu vaccine between the months September to December. Offered empathy, support, legitimation, prayers, partnership to patient. Praised patient for progress. ACP handout given today. Declines honoring choices referral.  
Follow-up Disposition: 
Return in about 6 months (around 2/3/2019) for blood pressure, referral follow up. Patient was offered a choice/choices in the treatment plan today. Patient expresses understanding of the plan and agrees with recommendations. Written by shawna Ricci, as dictated by Dr. Robin Long DO. Documentation True and Accepted by Radha Kinsey. Vannessa Ventura. Patient Instructions Healthy Upper Back: Exercises Your Care Instructions Here are some examples of exercises for your upper back. Start each exercise slowly. Ease off the exercise if you start to have pain. Your doctor or physical therapist will tell you when you can start these exercises and which ones will work best for you. How to do the exercises Lower neck and upper back stretch 1. Stretch your arms out in front of your body. Clasp one hand on top of your other hand. 2. Gently reach out so that you feel your shoulder blades stretching away from each other. 3. Gently bend your head forward. 4. Hold for 15 to 30 seconds. 5. Repeat 2 to 4 times. Midback stretch If you have knee pain, do not do this exercise. 1. Kneel on the floor, and sit back on your ankles. 2. Lean forward, place your hands on the floor, and stretch your arms out in front of you. Rest your head between your arms. 3. Gently push your chest toward the floor, reaching as far in front of you as possible. 4. Hold for 15 to 30 seconds. 5. Repeat 2 to 4 times. Shoulder rolls 1. Sit comfortably with your feet shoulder-width apart. You can also do this exercise while standing. 2. Roll your shoulders up, then back, and then down in a smooth, circular motion. 3. Repeat 2 to 4 times. Wall push-up 1. Stand against a wall with your feet about 12 to 24 inches back from the wall. If you feel any pain when you do this exercise, stand closer to the wall. 2. Place your hands on the wall slightly wider apart than your shoulders, and lean forward. 3. Gently lean your body toward the wall. Then push back to your starting position. Keep the motion smooth and controlled. 4. Repeat 8 to 12 times. Resisted shoulder blade squeeze For this exercise, you will need elastic exercise material, such as surgical tubing or Thera-Band. 1. Sit or stand, holding the band in both hands in front of you. Keep your elbows close to your sides, bent at a 90-degree angle. Your palms should face up. 2. Squeeze your shoulder blades together, and move your arms to the outside, stretching the band. Be sure to keep your elbows at your sides while you do this. 3. Relax. 4. Repeat 8 to 12 times. Resisted rows For this exercise, you will need elastic exercise material, such as surgical tubing or Thera-Band. 1. Put the band around a solid object, such as a bedpost, at about waist level. Hold one end of the band in each hand. 2. With your elbows at your sides and bent to 90 degrees, pull the band back to move your shoulder blades toward each other. Return to the starting position. 3. Repeat 8 to 12 times.  
Follow-up care is a key part of your treatment and safety. Be sure to make and go to all appointments, and call your doctor if you are having problems. It's also a good idea to know your test results and keep a list of the medicines you take. Where can you learn more? Go to http://marlo-estelita.info/. Enter E460 in the search box to learn more about \"Healthy Upper Back: Exercises. \" Current as of: November 29, 2017 Content Version: 11.7 © 1051-7256 [employer]. Care instructions adapted under license by [employer] (which disclaims liability or warranty for this information). If you have questions about a medical condition or this instruction, always ask your healthcare professional. [address] any warranty or liability for your use of this information.

## 2018-08-21 ENCOUNTER — HOSPITAL ENCOUNTER (OUTPATIENT)
Dept: BONE DENSITY | Age: 63
Discharge: HOME | End: 2018-08-21
Attending: FAMILY MEDICINE
Payer: COMMERCIAL

## 2018-08-21 DIAGNOSIS — M85.88 OSTEOPENIA OF OTHER SITE: ICD-10-CM

## 2018-08-21 DIAGNOSIS — M81.0 POSTMENOPAUSAL BONE LOSS: ICD-10-CM

## 2018-08-21 PROCEDURE — 77080 DXA BONE DENSITY AXIAL: CPT

## 2018-10-17 ENCOUNTER — HOSPITAL ENCOUNTER (EMERGENCY)
Age: 63
Discharge: HOME | End: 2018-10-17
Attending: EMERGENCY MEDICINE
Payer: COMMERCIAL

## 2018-10-17 ENCOUNTER — APPOINTMENT (OUTPATIENT)
Dept: CT IMAGING | Age: 63
End: 2018-10-17
Payer: COMMERCIAL

## 2018-10-17 VITALS
RESPIRATION RATE: 16 BRPM | OXYGEN SATURATION: 100 % | DIASTOLIC BLOOD PRESSURE: 92 MMHG | TEMPERATURE: 98 F | SYSTOLIC BLOOD PRESSURE: 153 MMHG | BODY MASS INDEX: 17.64 KG/M2 | WEIGHT: 109.79 LBS | HEIGHT: 66 IN | HEART RATE: 48 BPM

## 2018-10-17 DIAGNOSIS — S09.90XA INJURY OF HEAD, INITIAL ENCOUNTER: Primary | ICD-10-CM

## 2018-10-17 PROCEDURE — 99282 EMERGENCY DEPT VISIT SF MDM: CPT

## 2018-10-17 PROCEDURE — 70450 CT HEAD/BRAIN W/O DYE: CPT

## 2018-10-17 RX ORDER — BUTALBITAL, ACETAMINOPHEN AND CAFFEINE 300; 40; 50 MG/1; MG/1; MG/1
1 CAPSULE ORAL
Qty: 12 CAP | Refills: 0 | Status: SHIPPED | OUTPATIENT
Start: 2018-10-17 | End: 2021-03-29 | Stop reason: ALTCHOICE

## 2018-10-17 NOTE — ED NOTES
Patient discharged by [facility]. Patient provided with discharge instructions Rx and instructions on follow up care. Patient out of ED ambulatory accompanied by family.

## 2018-10-17 NOTE — ED TRIAGE NOTES
Pt. Presents to ED for complaints of intermittent headache after hitting her head on the trunk of a car on Saturday. Pt. Alert and oriented x4. PT. Placed in position of comfort with call bell in reach.
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
## 2018-10-17 NOTE — DISCHARGE INSTRUCTIONS
Learning About a Closed Head Injury  What is a closed head injury? A closed head injury happens when your head gets hit hard. The strong force of the blow causes your brain to shake in your skull. This movement can cause the brain to bruise, swell, or tear. Sometimes nerves or blood vessels also get damaged. This can cause bleeding in or around the brain. A concussion is a type of closed head injury. What are the symptoms? If you have a mild concussion, you may have a mild headache or feel \"not quite right. \" These symptoms are common. They usually go away over a few days to 4 weeks. But sometimes after a concussion, you feel like you can't function as well as before the injury. And you have new symptoms. This is called postconcussive syndrome. You may:  · Find it harder to solve problems, think, concentrate, or remember. · Have headaches. · Have changes in your sleep patterns, such as not being able to sleep or sleeping all the time. · Have changes in your personality. · Not be interested in your usual activities. · Feel angry or anxious without a clear reason. · Lose your sense of taste or smell. · Be dizzy, lightheaded, or unsteady. It may be hard to stand or walk. How is a closed head injury treated? Any person who may have a concussion needs to see a doctor. Some people have to stay in the hospital to be watched. Others can go home safely. If you go home, follow your doctor's instructions. He or she will tell you if you need someone to watch you closely for the next 24 hours or longer. Rest is the best treatment. Get plenty of sleep at night. And try to rest during the day. · Avoid activities that are physically or mentally demanding. These include housework, exercise, and schoolwork. And don't play video games, send text messages, or use the computer. You may need to change your school or work schedule to be able to avoid these activities.   · Ask your doctor when it's okay to drive, ride a bike, or operate machinery. · Take an over-the-counter pain medicine, such as acetaminophen (Tylenol), ibuprofen (Advil, Motrin), or naproxen (Aleve). Be safe with medicines. Read and follow all instructions on the label. · Check with your doctor before you use any other medicines for pain. · Do not drink alcohol or use illegal drugs. They can slow recovery. They can also increase your risk of getting a second head injury. Follow-up care is a key part of your treatment and safety. Be sure to make and go to all appointments, and call your doctor if you are having problems. It's also a good idea to know your test results and keep a list of the medicines you take. Where can you learn more? Go to http://marlo-estelita.info/. Enter E235 in the search box to learn more about \"Learning About a Closed Head Injury. \"  Current as of: June 4, 2018  Content Version: 11.8  © 0333-6442 [employer]. Care instructions adapted under license by [employer] (which disclaims liability or warranty for this information). If you have questions about a medical condition or this instruction, always ask your healthcare professional. [address] any warranty or liability for your use of this information.

## 2018-12-11 ENCOUNTER — OFFICE VISIT (OUTPATIENT)
Dept: FAMILY MEDICINE CLINIC | Age: 63
End: 2018-12-11

## 2018-12-11 VITALS
HEART RATE: 80 BPM | TEMPERATURE: 97.1 F | SYSTOLIC BLOOD PRESSURE: 96 MMHG | WEIGHT: 108.6 LBS | HEIGHT: 66 IN | BODY MASS INDEX: 17.45 KG/M2 | RESPIRATION RATE: 18 BRPM | OXYGEN SATURATION: 99 % | DIASTOLIC BLOOD PRESSURE: 60 MMHG

## 2018-12-11 DIAGNOSIS — Z82.0 FAMILY HISTORY OF ALZHEIMER'S DISEASE: ICD-10-CM

## 2018-12-11 DIAGNOSIS — I73.00 RAYNAUD'S PHENOMENON WITHOUT GANGRENE: ICD-10-CM

## 2018-12-11 DIAGNOSIS — E55.9 VITAMIN D DEFICIENCY: ICD-10-CM

## 2018-12-11 DIAGNOSIS — Z84.1 FAMILY HISTORY OF KIDNEY DISEASE: ICD-10-CM

## 2018-12-11 DIAGNOSIS — E78.5 DYSLIPIDEMIA: ICD-10-CM

## 2018-12-11 DIAGNOSIS — Z80.0 FAMILY HISTORY OF COLON CANCER: ICD-10-CM

## 2018-12-11 DIAGNOSIS — M54.40 ACUTE RIGHT-SIDED LOW BACK PAIN WITH SCIATICA, SCIATICA LATERALITY UNSPECIFIED: ICD-10-CM

## 2018-12-11 DIAGNOSIS — Z80.3 FAMILY HISTORY OF BREAST CANCER: ICD-10-CM

## 2018-12-11 DIAGNOSIS — R00.2 HEART PALPITATIONS: ICD-10-CM

## 2018-12-11 DIAGNOSIS — R12 HEARTBURN: ICD-10-CM

## 2018-12-11 DIAGNOSIS — Z82.49 FAMILY HISTORY OF HEART ATTACK: ICD-10-CM

## 2018-12-11 DIAGNOSIS — G44.309 POST-TRAUMATIC HEADACHE, NOT INTRACTABLE, UNSPECIFIED CHRONICITY PATTERN: ICD-10-CM

## 2018-12-11 DIAGNOSIS — I10 ESSENTIAL HYPERTENSION WITH GOAL BLOOD PRESSURE LESS THAN 140/90: Primary | ICD-10-CM

## 2018-12-11 RX ORDER — ACETAMINOPHEN 500 MG
TABLET ORAL
COMMUNITY
End: 2022-08-23 | Stop reason: ALTCHOICE

## 2018-12-11 RX ORDER — LISINOPRIL 20 MG/1
20 TABLET ORAL DAILY
Qty: 90 TAB | Refills: 3 | Status: SHIPPED | OUTPATIENT
Start: 2018-12-11 | End: 2019-12-01 | Stop reason: SDUPTHER

## 2018-12-11 RX ORDER — SIMVASTATIN 40 MG/1
40 TABLET, FILM COATED ORAL
Qty: 90 TAB | Refills: 3 | Status: SHIPPED | OUTPATIENT
Start: 2018-12-11 | End: 2019-12-02 | Stop reason: SDUPTHER

## 2018-12-11 RX ORDER — CYCLOBENZAPRINE HCL 5 MG
5 TABLET ORAL
Qty: 30 TAB | Refills: 1 | Status: SHIPPED | OUTPATIENT
Start: 2018-12-11 | End: 2020-01-15 | Stop reason: SDUPTHER
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
## 2018-12-11 NOTE — PATIENT INSTRUCTIONS
Healthy Upper Back: Exercises  Your Care Instructions  Here are some examples of exercises for your upper back. Start each exercise slowly. Ease off the exercise if you start to have pain. Your doctor or physical therapist will tell you when you can start these exercises and which ones will work best for you. How to do the exercises  Lower neck and upper back stretch    1. Stretch your arms out in front of your body. Clasp one hand on top of your other hand. 2. Gently reach out so that you feel your shoulder blades stretching away from each other. 3. Gently bend your head forward. 4. Hold for 15 to 30 seconds. 5. Repeat 2 to 4 times. Midback stretch    1. Kneel on the floor, and sit back on your ankles. 2. Lean forward, place your hands on the floor, and stretch your arms out in front of you. Rest your head between your arms. 3. Gently push your chest toward the floor, reaching as far in front of you as possible. 4. Hold for 15 to 30 seconds. 5. Repeat 2 to 4 times. Shoulder rolls    1. Sit comfortably with your feet shoulder-width apart. You can also do this exercise while standing. 2. Roll your shoulders up, then back, and then down in a smooth, circular motion. 3. Repeat 2 to 4 times. Wall push-up    1. Stand against a wall with your feet about 12 to 24 inches back from the wall. If you feel any pain when you do this exercise, stand closer to the wall. 2. Place your hands on the wall slightly wider apart than your shoulders, and lean forward. 3. Gently lean your body toward the wall. Then push back to your starting position. Keep the motion smooth and controlled. 4. Repeat 8 to 12 times. Resisted shoulder blade squeeze    1. Sit or stand, holding the band in both hands in front of you. Keep your elbows close to your sides, bent at a 90-degree angle. Your palms should face up. 2. Squeeze your shoulder blades together, and move your arms to the outside, stretching the band.  Be sure to keep your elbows at your sides while you do this. 3. Relax. 4. Repeat 8 to 12 times. Resisted rows    1. Put the band around a solid object, such as a bedpost, at about waist level. Hold one end of the band in each hand. 2. With your elbows at your sides and bent to 90 degrees, pull the band back to move your shoulder blades toward each other. Return to the starting position. 3. Repeat 8 to 12 times. Follow-up care is a key part of your treatment and safety. Be sure to make and go to all appointments, and call your doctor if you are having problems. It's also a good idea to know your test results and keep a list of the medicines you take. Where can you learn more? Go to http://marlo-estelita.info/. Enter R182 in the search box to learn more about \"Healthy Upper Back: Exercises. \"  Current as of: November 29, 2017  Content Version: 11.8  © 3355-8453 [employer]. Care instructions adapted under license by [employer] (which disclaims liability or warranty for this information). If you have questions about a medical condition or this instruction, always ask your healthcare professional. [place] [address] any warranty or liability for your use of this information. Back Pain: Care Instructions  Your Care Instructions    Back pain has many possible causes. It is often related to problems with muscles and ligaments of the back. It may also be related to problems with the nerves, discs, or bones of the back. Moving, lifting, standing, sitting, or sleeping in an awkward way can strain the back. Sometimes you don't notice the injury until later. Arthritis is another common cause of back pain. Although it may hurt a lot, back pain usually improves on its own within several weeks. Most people recover in 12 weeks or less. Using good home treatment and being careful not to stress your back can help you feel better sooner.   Follow-up care is a key part of your treatment and safety. Be sure to make and go to all appointments, and call your doctor if you are having problems. It's also a good idea to know your test results and keep a list of the medicines you take. How can you care for yourself at home? · Sit or lie in positions that are most comfortable and reduce your pain. Try one of these positions when you lie down:  ? Lie on your back with your knees bent and supported by large pillows. ? Lie on the floor with your legs on the seat of a sofa or chair. ? Lie on your side with your knees and hips bent and a pillow between your legs. ? Lie on your stomach if it does not make pain worse. · Do not sit up in bed, and avoid soft couches and twisted positions. Bed rest can help relieve pain at first, but it delays healing. Avoid bed rest after the first day of back pain. · Change positions every 30 minutes. If you must sit for long periods of time, take breaks from sitting. Get up and walk around, or lie in a comfortable position. · Try using a heating pad on a low or medium setting for 15 to 20 minutes every 2 or 3 hours. Try a warm shower in place of one session with the heating pad. · You can also try an ice pack for 10 to 15 minutes every 2 to 3 hours. Put a thin cloth between the ice pack and your skin. · Take pain medicines exactly as directed. ? If the doctor gave you a prescription medicine for pain, take it as prescribed. ? If you are not taking a prescription pain medicine, ask your doctor if you can take an over-the-counter medicine. · Take short walks several times a day. You can start with 5 to 10 minutes, 3 or 4 times a day, and work up to longer walks. Walk on level surfaces and avoid hills and stairs until your back is better. · Return to work and other activities as soon as you can. Continued rest without activity is usually not good for your back.   · To prevent future back pain, do exercises to stretch and strengthen your back and stomach. Learn how to use good posture, safe lifting techniques, and proper body mechanics. When should you call for help? Call your doctor now or seek immediate medical care if:    · You have new or worsening numbness in your legs.     · You have new or worsening weakness in your legs. (This could make it hard to stand up.)     · You lose control of your bladder or bowels.    Watch closely for changes in your health, and be sure to contact your doctor if:    · You have a fever, lose weight, or don't feel well.     · You do not get better as expected. Where can you learn more? Go to http://marloEstechestelita.info/. Enter R789 in the search box to learn more about \"Back Pain: Care Instructions. \"  Current as of: November 29, 2017  Content Version: 11.8  © 8832-7396 [employer]. Care instructions adapted under license by [employer] (which disclaims liability or warranty for this information). If you have questions about a medical condition or this instruction, always ask your healthcare professional. [place] [address] any warranty or liability for your use of this information. Resistance Training With Free Weights: Exercises  Your Care Instructions  Here are some examples of exercises for resistance training. Start each exercise slowly. Ease off the exercise if you start to have pain. Your doctor or physical therapist will tell you when you can start these exercises and which ones will work best for you. How to do the exercises  Chest fly    1. Lie on a bench or exercise ball, and hold the weights straight up over your chest. Do not lock your elbows. You can keep them slightly bent if that is comfortable for you. 2. Slowly lower your arms, keeping them extended, until the weights are level with your chest, or slightly lower. 3. Slowly raise your arms until you are in the starting position. 4. Repeat 8 to 12 times.   5. Rest for a minute, and repeat the exercise. Lateral raise for the outer part of the shoulder (deltoid)    1. Stand with your feet shoulder-width apart and your knees slightly bent. 2. Bend your arms 90 degrees with your elbows at hip level. With your palms facing in, hold the weights straight in front of you. 3. Slowly lift the weights and your elbows out to the sides to shoulder level, keeping your elbows bent. Keep your shoulders down and relaxed as you lift. If you find you are shrugging your shoulders up toward your ears, your weights may be too heavy. 4. Slowly lower the weights back to your sides. 5. Repeat 8 to 12 times. 6. Rest for a minute, and repeat the exercise. Biceps curls    1. Sit leaning forward with your legs slightly spread and your left hand on your left thigh. 2. Hold the weight in your right hand, and place your right elbow on your right thigh. 3. Slowly curl the weight up and toward your chest.  4. Slowly lower the weight to the original position. 5. Repeat 8 to 12 times. 6. Rest for a minute, and repeat the exercise. 7. Do the same exercise with your other arm. Follow-up care is a key part of your treatment and safety. Be sure to make and go to all appointments, and call your doctor if you are having problems. It's also a good idea to know your test results and keep a list of the medicines you take. Where can you learn more? Go to http://marlo-estelita.info/. Enter D828 in the search box to learn more about \"Resistance Training With Free Weights: Exercises. \"  Current as of: December 7, 2017  Content Version: 11.8  © 2513-1862 [employer]. Care instructions adapted under license by [employer] (which disclaims liability or warranty for this information).  If you have questions about a medical condition or this instruction, always ask your healthcare professional. [address] any warranty or liability for your use of this information. Resistance Training With Surgical Tubing: Exercises  Your Care Instructions  Here are some examples of exercises for resistance training. Start each exercise slowly. Ease off the exercise if you start to have pain. Your doctor or physical therapist will tell you when you can start these exercises and which ones will work best for you. How to do the exercises  Side pull    1. Raise both arms overhead, palms of your hands facing forward. 2. Pull one arm down and to the side, bending your elbow as shown, and hold. 3. Slowly reach up again. Repeat with the other arm. 4. Repeat 8 to 12 times with each hand. 5. Rest for a minute, and repeat the exercise. Overhead pull    1. Raise both arms overhead, palms of your hands facing forward. 2. Tighten the tubing by slowly pulling both arms away from center, and hold. 3. Slowly return to the starting position with your arms straight up. 4. Repeat 8 to 12 times. 5. Rest for a minute, and repeat the exercise. Up-down pull    1. Raise both arms overhead. 2. Bend your elbows so that they are shoulder height, and hold the stretched tubing behind or in front of your head. 3. Slowly return to the starting position with your arms straight up. 4. Repeat 8 to 12 times. 5. Rest for a minute, and repeat the exercise. Chest-level pull    1. Raise your arms in front of you to chest level. Your elbows will be bent and held up at about shoulder height. 2. Pull your hands apart, stretching the tubing, and hold. Try to keep your hands up at your chest level, and do not pull your shoulders up toward your ears. 3. Slowly return to your starting position. 4. Repeat 8 to 12 times. 5. Rest for a minute, and repeat the exercise. Hip-level pull    1. Hold your hands at the level of your hips, or near your lap if you are sitting down. 2. Pull your hands apart, stretching the tubing, and hold. 3. Slowly return to your starting position.   4. Repeat 8 to 12 times. 5. Rest for a minute, and repeat the exercise. Follow-up care is a key part of your treatment and safety. Be sure to make and go to all appointments, and call your doctor if you are having problems. It's also a good idea to know your test results and keep a list of the medicines you take. Where can you learn more? Go to http://marlo-estelita.info/. Enter C780 in the search box to learn more about \"Resistance Training With Surgical Tubing: Exercises. \"  Current as of: December 7, 2017  Content Version: 11.8  © 0011-1966 [employer]. Care instructions adapted under license by [employer] (which disclaims liability or warranty for this information). If you have questions about a medical condition or this instruction, always ask your healthcare professional. [address] any warranty or liability for your use of this information. Learning About Osteopenia  What is osteopenia? Osteopenia is a decrease in thickness, or density, in bones. That means the bones become thinner and weaker. It is much more common in women than in men. It is an early form of osteoporosis, a condition in which the bones are so thin and weak that they can break easily. It's important to know that osteopenia is not a disease. It can happen normally with aging. Having osteopenia means that there is a greater risk that you may get osteoporosis. It also means that you are more likely to break a bone than someone who does not have osteopenia. But not everyone with osteopenia gets osteoporosis or breaks a bone. Osteopenia doesn't cause any symptoms. It's usually found with a type of X-ray called a bone density test. Osteopenia means that your bone density result (T-score) is between -1.0 and -2.5. What increases the risk for osteopenia? Things that increase your risk include:  · Having a family history of osteoporosis. · Being thin.   · Being white or .  · Getting too little physical activity. · Smoking. · Drinking too much alcohol often. · Using certain medicines such as steroids. How can you prevent osteoporosis? There are things you can do to slow down osteopenia and prevent osteoporosis. Certain lifestyle changes will help slow the loss of bone density. · Eat food that has plenty of calcium and vitamin D. Yogurt, cheese, milk, and dark green vegetables are high in calcium. Eggs, fatty fish, cereal, and fortified milk are high in vitamin D.  · Talk to your doctor about taking a calcium supplement that has vitamin D in it. · Get regular exercise. ? Do 30 minutes of weight-bearing exercise on most days of the week. Walking, jogging, stair climbing, and dancing are good choices. ? Do resistance exercises with weights or elastic bands 2 or 3 days a week. · Limit alcohol to 2 drinks a day for men and 1 drink a day for women. Too much alcohol can cause health problems. · Do not smoke. Smoking can make bones thin faster. If you need help quitting, talk to your doctor about stop-smoking programs and medicines. These can increase your chances of quitting for good. Prescription medicines are available for treating bone thinning. But these are more often used to treat osteoporosis. Follow-up care is a key part of your treatment and safety. Be sure to make and go to all appointments, and call your doctor if you are having problems. It's also a good idea to know your test results and keep a list of the medicines you take. Where can you learn more? Go to http://marlo-estelita.info/. Enter G023 in the search box to learn more about \"Learning About Osteopenia. \"  Current as of: March 16, 2018  Content Version: 11.8  © 9014-4632 [employer]. Care instructions adapted under license by [employer] (which disclaims liability or warranty for this information).  If you have questions about a medical condition or this instruction, always ask your healthcare professional. [place] [address] any warranty or liability for your use of this information.

## 2018-12-11 NOTE — LETTER
2018 11:09 AM 
 
Ms. Jalen Hansen [address] [facility] [address] [place] [address] TO:  [facility] 
 
RE:  Nidhi Carmen,  [birth year] SUBJECT:  MEDICAL NECESSITY FOR ERGONOMIC CHAIR Dear Magda Castelan: 
 
This letter is written on behalf of our patient, Ms. Tomy Ha,  [birth year]. I recommend she use an ergonomic chair to prevent triggering and worsening of her low back pain.    
 
Thank you, in advance, for assisting her with this request. 
 
Sincerely, 
 
 
Kashif Marcano, DO

## 2018-12-11 NOTE — PROGRESS NOTES
HISTORY OF PRESENT ILLNESS  Sakina Sarah is a 61 y.o. female presents with ED Follow-up; Medication Refill; Blood Pressure Check; LOW BACK PAIN; Headache; and Documentation    Agree with nurse note. Pt with hypertension, dyslipidemia, heart palpitations, raynaud's, vit d deficiency, and family hx of heart attack, kidney disease, alzheimer's disease, and colon ca presents to the office with a BP of 96/60. Patient denies vision changes, dizziness, chest pain, SOB, or swelling. For BP, she uses Lisinopril 20 mg daily, tolerating well. Pt with hx of migraines went to ED on 10/17/2018 after hitting her head on her car trunk. Denied LOC. She saw floaters. No N/V, no blurry vision/dizziness, no weakness, numbness, no confusion, no problems with speech/ambulation. /92, HR 48. CT head showed no acute findings. Pt reports they told her migraines could have been retriggered by the trauma. Dx'd injury to head. Rx'd Fioricet -40 mg. F/U with neurologist, Dr. Tierney Tinajero. Pt did not seen neuro and prefers a different one due to previous family experiences. Pt reports the medication makes her sleepy but does provide relief. Today, she reports she originally was having constant R sided headaches, but they are now triggered by bending her neck foreward. Pt complains of some upper back x2-3 weeks. She thinks it may be due to the way she sits at work. No known trauma to the area. She uses Tylenol up to 3 tabs daily or stretches with relief. She requests a letter so that she can have an ergonomic chair. Pt complains of dark colored urine and low back pain. Her back pain travels into legs on occasion but it mostly localized in her BL low back. She has increased her water intake to 48 oz daily. The patient denies fever, chills, abdominal pain, blood in the urine, pain with urination, increased frequency or other associated symptoms. Pt denies personal hx of kidney stones but her mother had kidney stones.      Pt with heartburn. She has reduced Protonix 40 mg to every other day with relief. She used it every day during Thanksgiving but has gone back to every other day. Health Maintenance    DEXA on 8/21/2018 showed osteopenia. F/U 2 years. Pt will schedule appointment with optometrist, Dr. Seven Price in 1/2019. Pt with family hx of breast ca reports she had a recent mammogram at [facility], [place]. Written by shawna Staton, as dictated by Dr. Efrain Swann DO.      ROS    Review of Systems negative except as noted above in HPI. ALLERGIES:    Allergies   Allergen Reactions    Prempro [Conj Estrog-Medroxyprogest Ace] Other (comments)     Benign Right breast mass. CURRENT MEDICATIONS:    Outpatient Medications Marked as Taking for the 12/11/18 encounter (Office Visit) with Yu Moseley DO   Medication Sig Dispense Refill    lisinopril (PRINIVIL, ZESTRIL) 20 mg tablet Take 1 Tab by mouth daily. 90 Tab 3    acetaminophen (TYLENOL EXTRA STRENGTH) 500 mg tablet Take  by mouth every six (6) hours as needed for Pain.  cyclobenzaprine (FLEXERIL) 5 mg tablet Take 1 Tab by mouth three (3) times daily as needed for Muscle Spasm(s). 30 Tab 1    simvastatin (ZOCOR) 40 mg tablet Take 1 Tab by mouth nightly. 90 Tab 3    butalbital-acetaminophen-caff (FIORICET) -40 mg per capsule Take 1 Cap by mouth every six (6) hours as needed for Pain or Headache for up to 12 doses. 12 Cap 0    pantoprazole (PROTONIX) 40 mg tablet TAKE 1 TABLET BY MOUTH EVERY DAY 90 Tab 2    Blood Pressure Test Kit-Wrist kit Check bp daily and as needed Dx:  Hypertension I10  Indications: hypertension 1 Kit 0    montelukast (SINGULAIR) 10 mg tablet TAKE 1 TABLET BY MOUTH EVERY DAY 90 Tab 3    valACYclovir (VALTREX) 500 mg tablet TAKE 1 TABLET BY MOUTH EVERY DAY DURING FLARES 90 Tab 3    aspirin delayed-release 81 mg tablet Take 1 Tab by mouth daily.  90 Tab 3    loratadine (CLARITIN) 10 mg tablet Take 1 Tab by mouth daily. 15 Tab 0    fluticasone (FLONASE) 50 mcg/actuation nasal spray 2 Sprays by Both Nostrils route daily. 1 Bottle 0    cyanocobalamin (VITAMIN B-12) 1,000 mcg tablet Take 1,000 mcg by mouth daily.  omega-3 fatty acids-vitamin e (FISH OIL) 1,000 mg Cap Take  by mouth daily.  MULTIVITAMINS (MULTIVITAMIN PO) Take  by mouth. Takes 3 times/week         PAST MEDICAL HISTORY:    Past Medical History:   Diagnosis Date    Allergic rhinitis, cause unspecified     Arthritis     fingers and toes    Breast mass 06/1999    Right. Benign. due to Prempro. Dr. Dorinda Gallo    Chickenpox childhood    Dizziness 10/2011    Dr. Christopher Coleman.  EBV positive mononucleosis syndrome 10/2010    positive titer.  Exposure to hepatitis B         Genital HSV     HA (headache)     Heart palpitations 05/21/09, 01/2018    Dr. Luís Warren    Hypoglycemia     Menopausal and postmenopausal disorder 1999    MRSA infection 12/2011    Right hip. Txd Bactrim.  Osteopenia     Dr. Priscilla Charles. Dr. Hilario [facility].  Other and unspecified hyperlipidemia     Raynaud phenomenon     RLS (restless legs syndrome) 2008    Shingles teenager    R arm    Vitamin D deficiency 2008       PAST SURGICAL HISTORY:    Past Surgical History:   Procedure Laterality Date    COLONOSCOPY  09/19/2016    Dr. Brenda Ventura. due q 10 yrs.  HX BREAST LUMPECTOMY  1999    Benign. Dr. Sheron Treadwell HX ORTHOPAEDIC Right 10/2012    Right arm. BENIGN TUMOR REMOVED.   Dr. Jet Gooden HISTORY:    Family History   Problem Relation Age of Onset    Other Mother         gallstones    Breast Cancer Mother 52        unilateral.   Tabby Pacheco Mother 62        Breast Cancer    Hypertension Father     Stroke Father 70        x2 CVAs and several TIAs    Kidney Disease Father     Asthma Paternal Uncle         x 2    Stroke Paternal Aunt     Diabetes Paternal Aunt     Heart Attack Paternal Aunt     Other Paternal Aunt gout    Diabetes Paternal Aunt     Cancer Maternal Grandmother         uterine    Colon Cancer Maternal Grandmother 79    Cancer Maternal Aunt         lung    Cancer Other         maternal cousins     Breast Cancer Other 28        x 2.  bilaterally.  Alzheimer Maternal Aunt         x 2       SOCIAL HISTORY:    Social History     Socioeconomic History    Marital status:      Spouse name: Not on file    Number of children: Not on file    Years of education: Not on file    Highest education level: Not on file   Tobacco Use    Smoking status: Former Smoker     Packs/day: 0.50     Years: 10.00     Pack years: 5.00     Types: Cigarettes     Last attempt to quit: 1985     Years since quittin.9    Smokeless tobacco: Never Used   Substance and Sexual Activity    Alcohol use: Yes     Alcohol/week: 1.5 oz     Types: 3 Standard drinks or equivalent per week     Comment: Occasional    Drug use: No    Sexual activity: Yes     Partners: Male     Birth control/protection: None       IMMUNIZATIONS:    Immunization History   Administered Date(s) Administered    Influenza Vaccine 10/01/2014, 10/01/2016, 10/06/2017, 10/05/2018    Influenza Vaccine Split 10/18/2010, 10/21/2011    TD Vaccine 2004    Tdap 2017         PHYSICAL EXAMINATION    Vital Signs    Visit Vitals  BP 96/60 (BP 1 Location: Left arm, BP Patient Position: Sitting)   Pulse 80   Temp 97.1 °F (36.2 °C) (Temporal)   Resp 18   Ht 5' 6\" (1.676 m)   Wt 108 lb 9.6 oz (49.3 kg)   SpO2 99%   BMI 17.53 kg/m²       Weight Metrics 2018 10/17/2018 8/3/2018 2018 2018 2018 1/10/2018   Weight 108 lb 9.6 oz 109 lb 12.6 oz 108 lb 3.2 oz 103 lb 1.6 oz 104 lb 102 lb 14.4 oz 105 lb 6.1 oz   BMI 17.53 kg/m2 17.72 kg/m2 17.46 kg/m2 16.64 kg/m2 16.79 kg/m2 16.61 kg/m2 17.01 kg/m2       General appearance - Well nourished. Well appearing. Well developed. No acute distress. Head - Normocephalic. Atraumatic.     Eyes - pupils equal and reactive. Extraocular eye movements intact. Sclera anicteric. Mildly injected sclera. Ears - Hearing is grossly normal bilaterally. Nose - normal and patent. No polyps noted. No erythema. No discharge. Mouth - mucous membranes with adequate moisture. Posterior pharynx normal with cobblestone appearance. No erythema, white exudate or obstruction. Neck - supple. Midline trachea. No carotid bruits noted bilaterally. No thyromegaly noted. Chest - clear to auscultation bilaterally anteriorly and posteriorly. No wheezes. No rales or rhonchi. Breath sounds are symmetrical bilaterally. Unlabored respirations. Heart - normal rate. Regular rhythm. Normal S1, S2. No murmur noted. No rubs, clicks or gallops noted. Abdomen - soft and nondistended. No masses or organomegaly. No rebound, rigidity or guarding. Bowel sounds normal x 4 quadrants. No tenderness noted. Neurological - awake, alert and oriented to person, place, and time and event. Cranial nerves II through XII intact. Clear speech. Muscle strength is +5/5 x 4 extremities. Sensation is intact to light touch bilaterally. Steady gait. Negative SLT BL. Heme/Lymph - peripheral pulses normal x 4 extremities. No peripheral edema is noted. Musculoskeletal - Intact x 4 extremities. Full ROM x 4 extremities. Mild back pain with movement. Back exam - normal range of motion. No pain on palpation of the spinous processes in the cervical, thoracic, lumbar, sacral regions. No CVA tenderness. R sided pain on palpation at T7 through SI joint. Skin is clear. Skin - no rashes, erythema, ecchymosis, lacerations, abrasions, suspicious moles noted  Psychological -   normal behavior, dress and thought processes. Good insight. Good eye contact. Normal affect. Appropriate mood. Normal speech.       DATA REVIEWED    Lab Results   Component Value Date/Time    WBC 5.2 01/10/2018 01:14 PM    WBC 3.8 (L) 05/29/2012 09:00 AM HGB 12.4 01/10/2018 01:14 PM    HCT 37.3 01/10/2018 01:14 PM    PLATELET 758 98/39/6669 01:14 PM    MCV 97.9 01/10/2018 01:14 PM     Lab Results   Component Value Date/Time    Sodium 142 01/10/2018 01:14 PM    Potassium 3.5 01/10/2018 01:14 PM    Chloride 106 01/10/2018 01:14 PM    CO2 30 01/10/2018 01:14 PM    Anion gap 6 01/10/2018 01:14 PM    Glucose 84 01/10/2018 01:14 PM    BUN 11 01/10/2018 01:14 PM    Creatinine 0.76 01/10/2018 01:14 PM    BUN/Creatinine ratio 14 01/10/2018 01:14 PM    GFR est AA >60 01/10/2018 01:14 PM    GFR est non-AA >60 01/10/2018 01:14 PM    Calcium 8.9 01/10/2018 01:14 PM    Bilirubin, total 0.5 01/10/2018 01:14 PM    AST (SGOT) 14 (L) 01/10/2018 01:14 PM    Alk. phosphatase 80 01/10/2018 01:14 PM    Protein, total 7.4 01/10/2018 01:14 PM    Albumin 4.0 01/10/2018 01:14 PM    Globulin 3.4 01/10/2018 01:14 PM    A-G Ratio 1.2 01/10/2018 01:14 PM    ALT (SGPT) 26 01/10/2018 01:14 PM     Lab Results   Component Value Date/Time    Cholesterol, total 177 03/21/2017 08:11 AM    HDL Cholesterol 71 03/21/2017 08:11 AM    LDL, calculated 94 03/21/2017 08:11 AM    VLDL, calculated 12 03/21/2017 08:11 AM    Triglyceride 58 03/21/2017 08:11 AM    CHOL/HDL Ratio 3.2 10/18/2010 11:28 AM     Lab Results   Component Value Date/Time    Vitamin D 25-Hydroxy 15.1 (L) 04/11/2011 09:00 AM    VITAMIN D, 25-HYDROXY 26.2 (L) 03/21/2017 08:11 AM       Lab Results   Component Value Date/Time    Hemoglobin A1c 5.1 03/21/2017 08:11 AM     Lab Results   Component Value Date/Time    TSH 1.480 03/21/2017 08:11 AM       Lab Results   Component Value Date/Time    Microalb/Creat ratio (ug/mg creat.) <5.4 03/22/2017 10:34 AM         ASSESSMENT and PLAN      ICD-10-CM ICD-9-CM    1. Essential hypertension with goal blood pressure less than 140/90 I10 401.9     stable   2. Post-traumatic headache, not intractable, unspecified chronicity pattern G44.309 339.20 REFERRAL TO SPORTS MEDICINE   3.  Acute right-sided low back pain with sciatica, sciatica laterality unspecified M54.40 724.2 REFERRAL TO SPORTS MEDICINE     724.3 URINALYSIS W/ RFLX MICROSCOPIC      cyclobenzaprine (FLEXERIL) 5 mg tablet   4. Heartburn R12 787.1    5. Dyslipidemia E78.5 272.4 simvastatin (ZOCOR) 40 mg tablet   6. Vitamin D deficiency E55.9 268.9 VITAMIN D, 25 HYDROXY   7. Heart palpitations R00.2 785.1     stable   8. Raynaud's phenomenon without gangrene I73.00 443.0 TSH 3RD GENERATION   9. Family history of Alzheimer's disease Z82.0 V17.2    10. Family history of heart attack Z82.49 V17.3    11. Family history of kidney disease Z84.1 V18.69    12. Family history of colon cancer Z80.0 V16.0    15. Family history of breast cancer Z80.3 V16.3        Discussed the patient's BMI with her. The BMI follow up plan is as follows: Pt not eligible for BMI calculation due to normal BMI. Decrease carbohydrates (white foods, sweet foods, sweet drinks and alcohol), increase green leafy vegetables and protein (lean meats and beans) with each meal.  Avoid fried foods. Eat 3-5 small meals daily. Do not skip meals. Increase water intake. Increase physical activity to 30 minutes daily for health benefit or 60 minutes daily to prevent weight regain, as tolerated. Get 7-8 hours uninterrupted sleep nightly. Chart reviewed and updated. Letter regarding ergonomic chair written, printed, and given to patient at ov today. Continue current medications and care. Start Flexeril 5 mg as needed for back tightness. Caution about drowsiness. Advised pt to monitor BP and if is in 90s/60s consistently, reduce to half tab of Lisinopril 20 mg.   Prescriptions written and sent to pharmacy; medication side effects discussed. Lisinopril 20 mg. Zocor 40 mg. Flexeril 5 mg. Recheck pertinent labs when fasting. Recent office visit notes from ED reviewed. Request notes from [facility]. Advised pt to sign release.    Referrals given; patient urged to keep appointments with specialists. Hold neuro referral and see Sports Med (Concussion specialist)  Counseled patient on health concerns:  Head injury, headaches, back care, osteopenia, heart burn, vit d deficiency, Raynaud's, hypertension, cholesterol, vit d deficiency. Relevant handouts given and discussed with patient. Immunizations noted; Advised pt to discuss Shingrix vaccine with insurance company and local pharmacy. Offered empathy, support, legitimation, prayers, partnership to patient. Praised patient for progress. ACP handout given today. Declines honoring choices referral.   Follow-up Disposition:  Return in about 3 months (around 3/11/2019) for blood pressure, referral follow up. Patient was offered a choice/choices in the treatment plan today. Patient expresses understanding of the plan and agrees with recommendations. More than 30 mins spent face to face with patient and more than 50% of this time spent in counseling and coordinating care. Written by shawna Moreno, as dictated by Dr. Kathy Reyes DO. Documentation True and Accepted by Ethan Palmer. John Vanegas. Patient Instructions          Healthy Upper Back: Exercises  Your Care Instructions  Here are some examples of exercises for your upper back. Start each exercise slowly. Ease off the exercise if you start to have pain. Your doctor or physical therapist will tell you when you can start these exercises and which ones will work best for you. How to do the exercises  Lower neck and upper back stretch    1. Stretch your arms out in front of your body. Clasp one hand on top of your other hand. 2. Gently reach out so that you feel your shoulder blades stretching away from each other. 3. Gently bend your head forward. 4. Hold for 15 to 30 seconds. 5. Repeat 2 to 4 times. Midback stretch    1. Kneel on the floor, and sit back on your ankles.   2. Lean forward, place your hands on the floor, and stretch your arms out in front of you. Rest your head between your arms. 3. Gently push your chest toward the floor, reaching as far in front of you as possible. 4. Hold for 15 to 30 seconds. 5. Repeat 2 to 4 times. Shoulder rolls    1. Sit comfortably with your feet shoulder-width apart. You can also do this exercise while standing. 2. Roll your shoulders up, then back, and then down in a smooth, circular motion. 3. Repeat 2 to 4 times. Wall push-up    1. Stand against a wall with your feet about 12 to 24 inches back from the wall. If you feel any pain when you do this exercise, stand closer to the wall. 2. Place your hands on the wall slightly wider apart than your shoulders, and lean forward. 3. Gently lean your body toward the wall. Then push back to your starting position. Keep the motion smooth and controlled. 4. Repeat 8 to 12 times. Resisted shoulder blade squeeze    1. Sit or stand, holding the band in both hands in front of you. Keep your elbows close to your sides, bent at a 90-degree angle. Your palms should face up. 2. Squeeze your shoulder blades together, and move your arms to the outside, stretching the band. Be sure to keep your elbows at your sides while you do this. 3. Relax. 4. Repeat 8 to 12 times. Resisted rows    1. Put the band around a solid object, such as a bedpost, at about waist level. Hold one end of the band in each hand. 2. With your elbows at your sides and bent to 90 degrees, pull the band back to move your shoulder blades toward each other. Return to the starting position. 3. Repeat 8 to 12 times. Follow-up care is a key part of your treatment and safety. Be sure to make and go to all appointments, and call your doctor if you are having problems. It's also a good idea to know your test results and keep a list of the medicines you take. Where can you learn more? Go to http://marlo-estelita.info/.   Enter G363 in the search box to learn more about \"Healthy Upper Back: Exercises. \"  Current as of: November 29, 2017  Content Version: 11.8  © 5110-0644 [employer]. Care instructions adapted under license by [employer] (which disclaims liability or warranty for this information). If you have questions about a medical condition or this instruction, always ask your healthcare professional. [address] any warranty or liability for your use of this information. Back Pain: Care Instructions  Your Care Instructions    Back pain has many possible causes. It is often related to problems with muscles and ligaments of the back. It may also be related to problems with the nerves, discs, or bones of the back. Moving, lifting, standing, sitting, or sleeping in an awkward way can strain the back. Sometimes you don't notice the injury until later. Arthritis is another common cause of back pain. Although it may hurt a lot, back pain usually improves on its own within several weeks. Most people recover in 12 weeks or less. Using good home treatment and being careful not to stress your back can help you feel better sooner. Follow-up care is a key part of your treatment and safety. Be sure to make and go to all appointments, and call your doctor if you are having problems. It's also a good idea to know your test results and keep a list of the medicines you take. How can you care for yourself at home? · Sit or lie in positions that are most comfortable and reduce your pain. Try one of these positions when you lie down:  ? Lie on your back with your knees bent and supported by large pillows. ? Lie on the floor with your legs on the seat of a sofa or chair. ? Lie on your side with your knees and hips bent and a pillow between your legs. ? Lie on your stomach if it does not make pain worse. · Do not sit up in bed, and avoid soft couches and twisted positions. Bed rest can help relieve pain at first, but it delays healing.  Avoid bed rest after the first day of back pain. · Change positions every 30 minutes. If you must sit for long periods of time, take breaks from sitting. Get up and walk around, or lie in a comfortable position. · Try using a heating pad on a low or medium setting for 15 to 20 minutes every 2 or 3 hours. Try a warm shower in place of one session with the heating pad. · You can also try an ice pack for 10 to 15 minutes every 2 to 3 hours. Put a thin cloth between the ice pack and your skin. · Take pain medicines exactly as directed. ? If the doctor gave you a prescription medicine for pain, take it as prescribed. ? If you are not taking a prescription pain medicine, ask your doctor if you can take an over-the-counter medicine. · Take short walks several times a day. You can start with 5 to 10 minutes, 3 or 4 times a day, and work up to longer walks. Walk on level surfaces and avoid hills and stairs until your back is better. · Return to work and other activities as soon as you can. Continued rest without activity is usually not good for your back. · To prevent future back pain, do exercises to stretch and strengthen your back and stomach. Learn how to use good posture, safe lifting techniques, and proper body mechanics. When should you call for help? Call your doctor now or seek immediate medical care if:    · You have new or worsening numbness in your legs.     · You have new or worsening weakness in your legs. (This could make it hard to stand up.)     · You lose control of your bladder or bowels.    Watch closely for changes in your health, and be sure to contact your doctor if:    · You have a fever, lose weight, or don't feel well.     · You do not get better as expected. Where can you learn more? Go to http://marlo-estelita.info/. Enter H191 in the search box to learn more about \"Back Pain: Care Instructions. \"  Current as of: November 29, 2017  Content Version: 11.8  © 2821-3832 [employer]. Care instructions adapted under license by [employer] (which disclaims liability or warranty for this information). If you have questions about a medical condition or this instruction, always ask your healthcare professional. [address] any warranty or liability for your use of this information. Resistance Training With Free Weights: Exercises  Your Care Instructions  Here are some examples of exercises for resistance training. Start each exercise slowly. Ease off the exercise if you start to have pain. Your doctor or physical therapist will tell you when you can start these exercises and which ones will work best for you. How to do the exercises  Chest fly    1. Lie on a bench or exercise ball, and hold the weights straight up over your chest. Do not lock your elbows. You can keep them slightly bent if that is comfortable for you. 2. Slowly lower your arms, keeping them extended, until the weights are level with your chest, or slightly lower. 3. Slowly raise your arms until you are in the starting position. 4. Repeat 8 to 12 times. 5. Rest for a minute, and repeat the exercise. Lateral raise for the outer part of the shoulder (deltoid)    1. Stand with your feet shoulder-width apart and your knees slightly bent. 2. Bend your arms 90 degrees with your elbows at hip level. With your palms facing in, hold the weights straight in front of you. 3. Slowly lift the weights and your elbows out to the sides to shoulder level, keeping your elbows bent. Keep your shoulders down and relaxed as you lift. If you find you are shrugging your shoulders up toward your ears, your weights may be too heavy. 4. Slowly lower the weights back to your sides. 5. Repeat 8 to 12 times. 6. Rest for a minute, and repeat the exercise. Biceps curls    1. Sit leaning forward with your legs slightly spread and your left hand on your left thigh.   2. Hold the weight in your right hand, and place your right elbow on your right thigh. 3. Slowly curl the weight up and toward your chest.  4. Slowly lower the weight to the original position. 5. Repeat 8 to 12 times. 6. Rest for a minute, and repeat the exercise. 7. Do the same exercise with your other arm. Follow-up care is a key part of your treatment and safety. Be sure to make and go to all appointments, and call your doctor if you are having problems. It's also a good idea to know your test results and keep a list of the medicines you take. Where can you learn more? Go to http://marlo-estelita.info/. Enter O993 in the search box to learn more about \"Resistance Training With Free Weights: Exercises. \"  Current as of: December 7, 2017  Content Version: 11.8  © 6348-9200 [employer]. Care instructions adapted under license by [employer] (which disclaims liability or warranty for this information). If you have questions about a medical condition or this instruction, always ask your healthcare professional. [place] [id number] any warranty or liability for your use of this information. Resistance Training With Surgical Tubing: Exercises  Your Care Instructions  Here are some examples of exercises for resistance training. Start each exercise slowly. Ease off the exercise if you start to have pain. Your doctor or physical therapist will tell you when you can start these exercises and which ones will work best for you. How to do the exercises  Side pull    1. Raise both arms overhead, palms of your hands facing forward. 2. Pull one arm down and to the side, bending your elbow as shown, and hold. 3. Slowly reach up again. Repeat with the other arm. 4. Repeat 8 to 12 times with each hand. 5. Rest for a minute, and repeat the exercise. Overhead pull    1. Raise both arms overhead, palms of your hands facing forward.   2. Tighten the tubing by slowly pulling both arms away from center, and hold. 3. Slowly return to the starting position with your arms straight up. 4. Repeat 8 to 12 times. 5. Rest for a minute, and repeat the exercise. Up-down pull    1. Raise both arms overhead. 2. Bend your elbows so that they are shoulder height, and hold the stretched tubing behind or in front of your head. 3. Slowly return to the starting position with your arms straight up. 4. Repeat 8 to 12 times. 5. Rest for a minute, and repeat the exercise. Chest-level pull    1. Raise your arms in front of you to chest level. Your elbows will be bent and held up at about shoulder height. 2. Pull your hands apart, stretching the tubing, and hold. Try to keep your hands up at your chest level, and do not pull your shoulders up toward your ears. 3. Slowly return to your starting position. 4. Repeat 8 to 12 times. 5. Rest for a minute, and repeat the exercise. Hip-level pull    1. Hold your hands at the level of your hips, or near your lap if you are sitting down. 2. Pull your hands apart, stretching the tubing, and hold. 3. Slowly return to your starting position. 4. Repeat 8 to 12 times. 5. Rest for a minute, and repeat the exercise. Follow-up care is a key part of your treatment and safety. Be sure to make and go to all appointments, and call your doctor if you are having problems. It's also a good idea to know your test results and keep a list of the medicines you take. Where can you learn more? Go to http://marlo-estelita.info/. Enter I613 in the search box to learn more about \"Resistance Training With Surgical Tubing: Exercises. \"  Current as of: December 7, 2017  Content Version: 11.8  © 9896-1045 Healthwise, Incorporated. Care instructions adapted under license by [employer] (which disclaims liability or warranty for this information).  If you have questions about a medical condition or this instruction, always ask your healthcare professional. [address] any warranty or liability for your use of this information. Learning About Osteopenia  What is osteopenia? Osteopenia is a decrease in thickness, or density, in bones. That means the bones become thinner and weaker. It is much more common in women than in men. It is an early form of osteoporosis, a condition in which the bones are so thin and weak that they can break easily. It's important to know that osteopenia is not a disease. It can happen normally with aging. Having osteopenia means that there is a greater risk that you may get osteoporosis. It also means that you are more likely to break a bone than someone who does not have osteopenia. But not everyone with osteopenia gets osteoporosis or breaks a bone. Osteopenia doesn't cause any symptoms. It's usually found with a type of X-ray called a bone density test. Osteopenia means that your bone density result (T-score) is between -1.0 and -2.5. What increases the risk for osteopenia? Things that increase your risk include:  · Having a family history of osteoporosis. · Being thin. · Being white or . · Getting too little physical activity. · Smoking. · Drinking too much alcohol often. · Using certain medicines such as steroids. How can you prevent osteoporosis? There are things you can do to slow down osteopenia and prevent osteoporosis. Certain lifestyle changes will help slow the loss of bone density. · Eat food that has plenty of calcium and vitamin D. Yogurt, cheese, milk, and dark green vegetables are high in calcium. Eggs, fatty fish, cereal, and fortified milk are high in vitamin D.  · Talk to your doctor about taking a calcium supplement that has vitamin D in it. · Get regular exercise. ? Do 30 minutes of weight-bearing exercise on most days of the week. Walking, jogging, stair climbing, and dancing are good choices.   ? Do resistance exercises with weights or elastic bands 2 or 3 days a week. · Limit alcohol to 2 drinks a day for men and 1 drink a day for women. Too much alcohol can cause health problems. · Do not smoke. Smoking can make bones thin faster. If you need help quitting, talk to your doctor about stop-smoking programs and medicines. These can increase your chances of quitting for good. Prescription medicines are available for treating bone thinning. But these are more often used to treat osteoporosis. Follow-up care is a key part of your treatment and safety. Be sure to make and go to all appointments, and call your doctor if you are having problems. It's also a good idea to know your test results and keep a list of the medicines you take. Where can you learn more? Go to http://marlo-estelita.info/. Enter F577 in the search box to learn more about \"Learning About Osteopenia. \"  Current as of: March 16, 2018  Content Version: 11.8  © 3135-3116 Healthwise, Incorporated. Care instructions adapted under license by [employer] (which disclaims liability or warranty for this information). If you have questions about a medical condition or this instruction, always ask your healthcare professional. [place] [address] any warranty or liability for your use of this information.

## 2018-12-11 NOTE — ACP (ADVANCE CARE PLANNING)
Re-discussed ACP with patient. Gave her another Right to [facility]. Declines referral to [facility] [employer] team at this time. Patient will bring document to her next office visit.

## 2018-12-19 ENCOUNTER — OFFICE VISIT (OUTPATIENT)
Dept: INTERNAL MEDICINE CLINIC | Age: 63
End: 2018-12-19

## 2018-12-19 VITALS
OXYGEN SATURATION: 98 % | HEIGHT: 66 IN | TEMPERATURE: 98.1 F | HEART RATE: 70 BPM | BODY MASS INDEX: 17.21 KG/M2 | RESPIRATION RATE: 16 BRPM | WEIGHT: 107.1 LBS | DIASTOLIC BLOOD PRESSURE: 83 MMHG | SYSTOLIC BLOOD PRESSURE: 143 MMHG

## 2018-12-19 DIAGNOSIS — H53.9: ICD-10-CM

## 2018-12-19 DIAGNOSIS — G44.309 POST-CONCUSSION HEADACHE: ICD-10-CM

## 2018-12-19 DIAGNOSIS — M62.838 TRAPEZIUS MUSCLE SPASM: ICD-10-CM

## 2018-12-19 DIAGNOSIS — S06.0X0A CONCUSSION WITHOUT LOSS OF CONSCIOUSNESS, INITIAL ENCOUNTER: Primary | ICD-10-CM

## 2018-12-19 RX ORDER — AMITRIPTYLINE HYDROCHLORIDE 10 MG/1
10 TABLET, FILM COATED ORAL
Qty: 30 TAB | Refills: 3 | Status: SHIPPED | OUTPATIENT
Start: 2018-12-19 | End: 2019-04-18 | Stop reason: SDUPTHER

## 2018-12-19 NOTE — PATIENT INSTRUCTIONS
Concussion: Care Instructions  Your Care Instructions    A concussion is a kind of injury to the brain. It happens when the head receives a hard blow. The impact can jar or shake the brain against the skull. This interrupts the brain's normal activities. Although you may have cuts or bruises on your head or face, you may have no other visible signs of a brain injury. In most cases, damage to the brain from a concussion can't be seen in tests such as a CT or MRI scan. For a few weeks, you may have low energy, dizziness, trouble sleeping, a headache, ringing in your ears, or nausea. You may also feel anxious, grumpy, or depressed. You may have problems with memory and concentration. These symptoms are common after a concussion. They should slowly improve over time. Sometimes this takes weeks or even months. Someone who lives with you should know how to care for you. Please share this and all information with a caregiver who will be available to help if needed. Follow-up care is a key part of your treatment and safety. Be sure to make and go to all appointments, and call your doctor if you are having problems. It's also a good idea to know your test results and keep a list of the medicines you take. How can you care for yourself at home? Pain control  · Put ice or a cold pack on the part of your head that hurts for 10 to 20 minutes at a time. Put a thin cloth between the ice and your skin. · Be safe with medicines. Read and follow all instructions on the label. ? If the doctor gave you a prescription medicine for pain, take it as prescribed. ? If you are not taking a prescription pain medicine, ask your doctor if you can take an over-the-counter medicine. Recovery  · Follow your doctor's instructions. He or she will tell you if you need someone to watch you closely for the next 24 hours or longer. · Rest is the best way to recover from a concussion.  You need to rest your body and your brain:  ? Get plenty of sleep at night. And take rest breaks during the day. ? Avoid activities that take a lot of physical or mental work. This includes housework, exercise, schoolwork, video games, text messaging, and using the computer. ? You may need to change your school or work schedule while you recover. ? Return to your normal activities slowly. Do not try to do too much at once. · Do not drink alcohol or use illegal drugs. Alcohol and illegal drugs can slow your recovery. And they can increase your risk of a second brain injury. · Avoid activities that could lead to another concussion. Follow your doctor's instructions for a gradual return to activity and sports. · Ask your doctor when it's okay for you to drive a car, ride a bike, or operate machinery. How should you return to activity? Your return to activity can begin after 1 to 2 days of physical and mental rest. After resting, you can gradually increase your activity as long as it does not cause new symptoms or worsen your symptoms. Doctors and concussion specialists suggest steps to follow for returning to sports after a concussion. Use these steps as a guide. You should slowly progress through the following levels of activity:  1. Limited activity. You can take part in daily activities as long as the activity doesn't increase your symptoms or cause new symptoms. 2. Light aerobic activity. This can include walking, swimming, or other exercise at less than 70% of maximum heart rate. No resistance training is included in this step. 3. Sport-specific exercise. This includes running drills or skating drills (depending on the sport), but no head impact. 4. Noncontact training drills. This includes more complex training drills such as passing. The athlete may also begin light resistance training. 5. Full-contact practice. The athlete can participate in normal training. 6. Return to normal game play.  This is the final step and allows the athlete to join in normal game play. Watch and keep track of your progress. It should take at least 6 days for you to go from light activity to normal game play. Make sure that you can stay at each new level of activity for at least 24 hours without symptoms, or as long as your doctor says, before doing more. If one or more symptoms come back, return to a lower level of activity for at least 24 hours. Don't move on until all symptoms are gone. When should you call for help? Call 911 anytime you think you may need emergency care. For example, call if:    · You have a seizure.     · You passed out (lost consciousness).     · You are confused or can't stay awake.    Call your doctor now or seek immediate medical care if:    · You have new or worse vomiting.     · You feel less alert.     · You have new weakness or numbness in any part of your body.    Watch closely for changes in your health, and be sure to contact your doctor if:    · You do not get better as expected.     · You have new symptoms, such as headaches, trouble concentrating, or changes in mood. Where can you learn more? Go to http://marlo-estelita.info/. Enter H641 in the search box to learn more about \"Concussion: Care Instructions. \"  Current as of: September 10, 2017  Content Version: 11.8  © 9262-8226 Healthwise, Incorporated. Care instructions adapted under license by [employer] (which disclaims liability or warranty for this information). If you have questions about a medical condition or this instruction, always ask your healthcare professional. [place] [address] any warranty or liability for your use of this information. Headache: Care Instructions  Your Care Instructions    Headaches have many possible causes. Most headaches aren't a sign of a more serious problem, and they will get better on their own. Home treatment may help you feel better faster.   The doctor has checked you carefully, but problems can develop later. If you notice any problems or new symptoms, get medical treatment right away. Follow-up care is a key part of your treatment and safety. Be sure to make and go to all appointments, and call your doctor if you are having problems. It's also a good idea to know your test results and keep a list of the medicines you take. How can you care for yourself at home? · Do not drive if you have taken a prescription pain medicine. · Rest in a quiet, dark room until your headache is gone. Close your eyes and try to relax or go to sleep. Don't watch TV or read. · Put a cold, moist cloth or cold pack on the painful area for 10 to 20 minutes at a time. Put a thin cloth between the cold pack and your skin. · Use a warm, moist towel or a heating pad set on low to relax tight shoulder and neck muscles. · Have someone gently massage your neck and shoulders. · Take pain medicines exactly as directed. ? If the doctor gave you a prescription medicine for pain, take it as prescribed. ? If you are not taking a prescription pain medicine, ask your doctor if you can take an over-the-counter medicine. · Be careful not to take pain medicine more often than the instructions allow, because you may get worse or more frequent headaches when the medicine wears off. · Do not ignore new symptoms that occur with a headache, such as a fever, weakness or numbness, vision changes, or confusion. These may be signs of a more serious problem. To prevent headaches  · Keep a headache diary so you can figure out what triggers your headaches. Avoiding triggers may help you prevent headaches. Record when each headache began, how long it lasted, and what the pain was like (throbbing, aching, stabbing, or dull). Write down any other symptoms you had with the headache, such as nausea, flashing lights or dark spots, or sensitivity to bright light or loud noise. Note if the headache occurred near your period.  List anything that might have triggered the headache, such as certain foods (chocolate, cheese, wine) or odors, smoke, bright light, stress, or lack of sleep. · Find healthy ways to deal with stress. Headaches are most common during or right after stressful times. Take time to relax before and after you do something that has caused a headache in the past.  · Try to keep your muscles relaxed by keeping good posture. Check your jaw, face, neck, and shoulder muscles for tension, and try relaxing them. When sitting at a desk, change positions often, and stretch for 30 seconds each hour. · Get plenty of sleep and exercise. · Eat regularly and well. Long periods without food can trigger a headache. · Treat yourself to a massage. Some people find that regular massages are very helpful in relieving tension. · Limit caffeine by not drinking too much coffee, tea, or soda. But don't quit caffeine suddenly, because that can also give you headaches. · Reduce eyestrain from computers by blinking frequently and looking away from the computer screen every so often. Make sure you have proper eyewear and that your monitor is set up properly, about an arm's length away. · Seek help if you have depression or anxiety. Your headaches may be linked to these conditions. Treatment can both prevent headaches and help with symptoms of anxiety or depression. When should you call for help? Call 911 anytime you think you may need emergency care. For example, call if:    · You have signs of a stroke. These may include:  ? Sudden numbness, paralysis, or weakness in your face, arm, or leg, especially on only one side of your body. ? Sudden vision changes. ? Sudden trouble speaking. ? Sudden confusion or trouble understanding simple statements. ? Sudden problems with walking or balance.   ? A sudden, severe headache that is different from past headaches.    Call your doctor now or seek immediate medical care if:    · You have a new or worse headache.     · Your headache gets much worse. Where can you learn more? Go to http://marlo-estelita.info/. Enter M271 in the search box to learn more about \"Headache: Care Instructions. \"  Current as of: June 4, 2018  Content Version: 11.8  © 7499-9704 Healthwise, Incorporated. Care instructions adapted under license by [employer] (which disclaims liability or warranty for this information). If you have questions about a medical condition or this instruction, always ask your healthcare professional. [place] [address] any warranty or liability for your use of this information.

## 2018-12-19 NOTE — PROGRESS NOTES
Chief Complaint   Patient presents with    Concussion         HPI:  she is a 61y.o. year old female who presents for evaluation of concussion      Concussion Clinic - Initial Evaluation    Referring Provider: Nydia Bertrand    Date of Injury: 10/10/18    Mechanism of Injury: bumped head on trunk    Removed from play?:Not applicable    Amnesia:       Retrograde 0 minutes       Anterograde 0 minutes    Red Flags:  Worsening headaches - Yes  Severe neck pain - No  Very sleepy - No  Can't recognize people or places  - No  Deteriorating consciousness  - No  Increasing confusion or irritability - No  Worsening vomiting  - No  Slurred speech  - No  Focal neurological signs - No  Weakness/numbness in limbs  - No  Severe behavior change - No  Seizures (after the initial event) - No      Initial Management:       Physical exertion: No       School attendance: No       Cognitive rest: Yes    Imaging: Yes      Previous Concussions (include dates, duration and any treatments): No    Any increasing concussability:Not applicable    Have subsequent concussions been more severe:Not applicable    Developmental History:       Learning disability: No       ADHD: no       Other developmental abnormalities No    Medical history that may modify recovery:       Headaches: Yes       Migraine Headaches: Yes       Epilepsy: No       Thyroid disease: No       History of CNS infection: No    Psychiatric history that may modify recovery:       Anxiety: No       Depression: No       Sleep disorder: No    Reviewed and agree with Nurse Note and duplicated in this note. Reviewed PmHx, RxHx, FmHx, SocHx, AllgHx and updated and dated in the chart.     Family History   Problem Relation Age of Onset    Other Mother         gallstones    Breast Cancer Mother 52        unilateral.   Jerzy Rice Mother 62        Breast Cancer    Hypertension Father     Stroke Father 70        x2 CVAs and several TIAs    Kidney Disease Father     Asthma Paternal Uncle x 2    Stroke Paternal Aunt     Diabetes Paternal Aunt     Heart Attack Paternal Aunt     Other Paternal Aunt         gout    Diabetes Paternal Aunt     Cancer Maternal Grandmother         uterine    Colon Cancer Maternal Grandmother 79    Cancer Maternal Aunt         lung    Cancer Other         maternal cousins     Breast Cancer Other 28        x 2.  bilaterally.  Alzheimer Maternal Aunt         x 2       Past Medical History:   Diagnosis Date    Allergic rhinitis, cause unspecified     Arthritis     fingers and toes    Breast mass 1999    Right. Benign. due to Prempro. Dr. Nino Wallace    Chickenpox childhood    Dizziness 10/2011    Dr. Michelle Zee.  EBV positive mononucleosis syndrome 10/2010    positive titer.  Exposure to hepatitis B         Genital HSV     HA (headache)     Heart palpitations 09, 2018    Dr. Selin Powell    Hypoglycemia     Menopausal and postmenopausal disorder     MRSA infection 2011    Right hip. Txd Bactrim.  Osteopenia     Dr. Ena Singh. Dr. Marilin Dimas.  Other and unspecified hyperlipidemia     Raynaud phenomenon     RLS (restless legs syndrome)     Shingles teenager    R arm    Vitamin D deficiency 2008      Social History     Socioeconomic History    Marital status:      Spouse name: Not on file    Number of children: Not on file    Years of education: Not on file    Highest education level: Not on file   Tobacco Use    Smoking status: Former Smoker     Packs/day: 0.50     Years: 10.00     Pack years: 5.00     Types: Cigarettes     Last attempt to quit: 1985     Years since quittin.9    Smokeless tobacco: Never Used   Substance and Sexual Activity    Alcohol use:  Yes     Alcohol/week: 1.5 oz     Types: 3 Standard drinks or equivalent per week     Comment: Occasional    Drug use: No    Sexual activity: Yes     Partners: Male     Birth control/protection: None        Review of Systems - negative except as listed above      Objective:     Vitals:    12/19/18 0937   BP: 143/83   Pulse: 70   Resp: 16   Temp: 98.1 °F (36.7 °C)   TempSrc: Oral   SpO2: 98%   Weight: 107 lb 1.6 oz (48.6 kg)   Height: 5' 6\" (1.676 m)       Physical Examination: General appearance - alert, well appearing, and in no distress  Eyes - pupils equal and reactive, extraocular eye movements intact  Ears - bilateral TM's and external ear canals normal  Nose - normal and patent, no erythema, discharge or polyps  Mouth - mucous membranes moist, pharynx normal without lesions  Neck - supple, no significant adenopathy  NEUROLOGIC:     Cranial nerves II  XII: Intact   Speech: Normal.     Face: Symmetrical   Extremities: Moving all equally, well perfused, and no edema. DTR: WNL, equal and symmetric   Strength and sensation: Grossly intact. Gait: Normal     Able to perform 3 word recall at 1 min and 5 min. Able to spell WORLD frontwards and backwards. Serial 7s intact. Long term memory intact (remembers phone number, address, friends names). Vestibular-Ocular Screening:  Ocular-Motor:   Smooth Pursuits (\"H-Test\"):  Negative   Saccades (Vertical/Horizontal):  Negative   Convergence (<6cm):  Positive    Accomodation (<6cm):  Positive    Vestibular-Ocular:   Gaze Stability: (Vertical/Horizontal): Negative   VOR cancellation:  Negative    Balance Examination:   Romberg, compliant Foam     Eyes Open:  Negative     Eyes Closed:  Positive            SCAT3 Scores -     Date  Symptom Score    12/19/18 4/8           Comprehensive evaluation, administration and interpretation of SCAT3/Impact Neuro Psych testing completed at office visit today. Results scanned into chart. 1. Brief discussion with  10 minutes   2. Interview & brief neurological   and balance exam with athlete 40 minutes  3. Brief interview with parent (if a minor) 15 minutes   4. ImPACT testing (patient alone) 30 minutes  5.  Review results and discuss concussion   and return to play with athlete and parent 20 minutes  6. Brief report (dictated) 20 minutes  7. Feedback with ATC next day 15 minutes  8. Feedback with parent two days later 15 minutes    Spent 60min face-to-face, >50% spent on counseling & patient education. Assessment/ Plan:   Diagnoses and all orders for this visit:    1. Concussion without loss of consciousness, initial encounter  -     REFERRAL TO PHYSICAL THERAPY    2. Post-concussion headache  -     REFERRAL TO PHYSICAL THERAPY    3. Trapezius muscle spasm  -     REFERRAL TO PHYSICAL THERAPY    4. Impaired visual accommodation  -     REFERRAL TO OPHTHALMOLOGY  -     REFERRAL TO PHYSICAL THERAPY    Other orders  -     amitriptyline (ELAVIL) 10 mg tablet; Take 1 Tab by mouth nightly. Follow-up Disposition:  Return in about 3 weeks (around 1/9/2019). 1. Rest.  No sports or exercise until cleared. 2. Concussions typically results in the rapid onset of short-lived impairment that resolves spontaneously over time (usually within 1 week - 1 month). 3.  Vestibular Rehab Referral - Eval/Therapy  yes        Signs to watch for:  Problems could arise over the first 24-48 hours. You should not be left alone and must go to a hospital at once it you:  1. Have a headache that gets worse. 2. Are very drowsy or cant be awakened (woken up). 3. Cant recognize people or places. 4. Have repeated vomiting  5. Behave unusually or seem confused; are very irritable. 6. Have seizures (arms and legs jerk uncontrollably). 7. Are unsteady on your feet; have slurred speech; vision or hearing changes. A structured, graded exertion protocol should be developed; individualized on the basis of sport, age and the concussion history of the athlete.  Exercise or training should be commenced only after the athlete is clearly asymptomatic with physical and cognitive rest. Final decision for clearance to return to competition should ideally be made by a medical doctor. When returning athletes to play, they should follow a stepwise symptom-limited program, with stages of progression. For example:   1. rest until asymptomatic (physical and mental rest)   2. light aerobic exercise (e.g. stationary cycle)   3. sport-specific exercise   4. non-contact training drills (start light resistance training)   5. full contact training after medical clearance   6. return to competition (game play)     There should be approximately 24 hours (or longer) for each stage and the athlete should return to stage 1 if symptoms recur. Resistance training should only be added in the later stages. Medical clearance should be given before return to play. Follow up in 14 Days, will consider Impact/Scat3 test at next visit    I have discussed the diagnosis with the patient and the intended plan as seen in the above orders. The patient has received an after-visit summary and questions were answered concerning future plans. Medication Side Effects and Warnings were discussed with patient,  Patient Labs were reviewed and or requested, and  Patient Past Records were reviewed and or requested  yes       Pt agrees to call or return to clinic and/or go to closest ER with any worsening of symptoms. This may include, but not limited to increased fever (>100.4) with NSAIDS or Tylenol, increased edema, confusion, rash, worsening of presenting symptoms.

## 2018-12-20 ENCOUNTER — HOSPITAL ENCOUNTER (OUTPATIENT)
Dept: PHYSICAL THERAPY | Age: 63
Discharge: HOME | End: 2018-12-20
Payer: COMMERCIAL

## 2018-12-20 PROCEDURE — 97140 MANUAL THERAPY 1/> REGIONS: CPT | Performed by: PHYSICAL THERAPIST

## 2018-12-20 PROCEDURE — 97110 THERAPEUTIC EXERCISES: CPT | Performed by: PHYSICAL THERAPIST

## 2018-12-20 PROCEDURE — 97162 PT EVAL MOD COMPLEX 30 MIN: CPT | Performed by: PHYSICAL THERAPIST

## 2018-12-20 NOTE — PROGRESS NOTES
PT INITIAL EVALUATION NOTE 2-15    Patient Name: Ingrid Mathews  Date:2018  : 1955  [x]  Patient  Verified  Payor: BLUE CROSS / Plan: [facility] PPO / Product Type: PPO /    In time:918  Out time:1000  Total Treatment Time (min): 42  Visit #: 1     Treatment Area: Other muscle spasm [M62.838]    SUBJECTIVE  Pain Level (0-10 scale): 1-2   Any medication changes, allergies to medications, adverse drug reactions, diagnosis change, or new procedure performed?: [] No    [x] Yes (see summary sheet for update)  Subjective:     Pt reports that she was getting groceries in the trunk and hit the front of her head on the latch for the trunk. This took place 10/12/18. She stated that it hurt at the time but she was managing it with tylenol. A week later the pain had not improved and she went to the ER to have it checked out. She was diagnosed with post concussion syndrome. She was sent to Dr. Wilberto Cassidy by her PCP. She is now present to begin therapy for this. She mainly complains of tightness and stiffness in muscles of her neck and shoulder as well as slightly increased blurred vision in the right eye which is \"running\" more now since the incident. She denies LOC. Headaches are intermittent. She has a history of migraines when she was a teenager but denies recent issues.           OBJECTIVE/EXAMINATION  Observations:  Posture: slouched forward head  Gait: unrmarkable  Functional Mobility:  Palpation: tenerness along the cervical paraspinals, Ut, levator  Cervical AROM:  Flexion WFL  Extension WFL  Side Bend R limited by 25%  L WFL  Rotation R WFL  L WFL  Strength:  UE: Grossly 4/5  LE: Grossly NT  Vision:   Spontaneous Nystagmus: -   Gaze Hold Nystagmus: -   Occulomotor control (H pattern): -   Smooth Pursuit (H pattern): + left lower quadrant   Convergence: +   Saccades (shift eyes bw 2 targets): + \"dry eyes\"   Visual Acuity: Not tested but there is limitation based on verbal report  Yasmany Steele is sending her to a specialist    Gaze stabilization (hold eyes on stable target while moving head): -   Skew Eye Deviation: NT  Vestibular Function:   VOR: slow head movements: -   VOR: fast head movements: -   Head Thrust: NT  Cerebellar Function:    Finger to nose: + difficulty touching finger accurately   Pointing/ past pointing: NT   Rapid forearm supination/pronation:-   VOR Cancellation: tested by asking patient to focus on a moving target while  head is moved in the same direction  Vertebral Artery Test: NT  BPPV:  Brody Hallpike: NT  Roll Test: NT  Head Hang: NT   Cervical Tests:   Alar Ligament Test: NT   Sharp Fidel Test: NT   Traction Test: -   Neck Torsion Test: NT   Smooth Pursuit Neck Torsion Test: NT    Mobility Assessment: decreased cervical spine mobility with lateral glides C3-T1          10 min Therapeutic Exercise:  [x] See flow sheet :   Rationale: increase ROM, increase strength, improve coordination, improve balance and increase proprioception to improve the patients ability to complete all activity without increased symptoms    15 min Manual Therapy:  PROM with distraction, STM to levator and UT as well as paraspinals to address turgor and guarding   Rationale: decrease pain, increase ROM, increase tissue extensibility, decrease trigger points and increase postural awareness  to improve the patients ability to move through grater ROM with all activity and maintain more stable posture    Other Objective/Functional Measures:FOTO 81, Post concussion symptoms score 9    Pain Level (0-10 scale) post treatment: 1-2      ASSESSMENT:      [x]  See Plan of JOHN Soriano 12/20/2018  9:18 AM

## 2018-12-21 NOTE — PROGRESS NOTES
[address] [id number] (MOB IV), [address] [facility]  [address]  Phone: [phone] Fax: [phone]    Plan of Care/Statement of Necessity for Physical Therapy Services  2-15    Patient name: Ander Treviño  : 1955  Provider#: [id number]  Referral source: Philomena Carvajal MD      Medical/Treatment Diagnosis: Other muscle spasm [M62.838]     Prior Hospitalization: see medical history     Comorbidities: allergies, arthritis, back pain, headaches, HBP, prior surgery  Prior Level of Function: 20 minutes of exercise seldom if ever  Medications: Verified on Patient Summary List    Start of Care: 18      Onset Date: 2018       The Plan of Care and following information is based on the information from the initial evaluation. Assessment/ key information: Pt is a 60 yo female who is in today to begin therapy for post concussion syndrome. She states that she hit her head on the trunk while putting groceries in. She initially felt ok but a week later symptoms persisted and she went to see an MD.  Her chief complaints today are neck and shoulder pain. She presents with increased turgor and muscle guarding throughout the cervical sine, upper trap, and levator which limits her cervical ROM. Strength is decreased globally but within functional limits. She denies LOC and has no complaint of dizziness or balance issues. On exam there is a significant positive convergence test and increased symptoms with smooth pursuit in the left lower visual field. There was some difficulty with saccade and VOR but these tests did not cause symptoms. She instead complained of dry eyes but reported that they were running. It is unclear if this is concussion related or if there is more of an underlying visual impairment. Pt was only able to recall 1/3 words and struggled to repeat backwards groups of 3 and 4 digits. Will continue to monitor these for progress.   Pt is a good candidate for PT and will benefit from skilled services to address these deficits and work toward the goals listed below. Evaluation Complexity History HIGH Complexity :3+ comorbidities / personal factors will impact the outcome/ POC ; Examination HIGH Complexity : 4+ Standardized tests and measures addressing body structure, function, activity limitation and / or participation in recreation  ;Presentation MEDIUM Complexity : Evolving with changing characteristics  ; Clinical Decision Making LOW Complexity : FOTO score of   Overall Complexity Rating: LOW     Problem List: pain affecting function, decrease ROM, decrease strength, decrease ADL/ functional abilitiies and decrease activity tolerance   Treatment Plan may include any combination of the following: Therapeutic exercise, Therapeutic activities, Neuromuscular re-education, Physical agent/modality, Gait/balance training, Manual therapy, Patient education, Self Care training, Functional mobility training, Home safety training and Stair training  Patient / Family readiness to learn indicated by: asking questions, trying to perform skills and interest  Persons(s) to be included in education: patient (P)  Barriers to Learning/Limitations: None  Patient Goal (s): releif from headaches and back  Patient Self Reported Health Status: good  Rehabilitation Potential: good    Short Term Goals: To be accomplished in 4-6 treatments:   Pt will be I with HEP   Pt will decrease guarding and turgor to improve AROM of the spine to complete ADL's   Pt will have 50% decrease in headaches  Long Term Goals: To be accomplished in 10-12 treatments:   Pt will have 0/10 pain with all activity   Pt will increase strength to improve posture with all activity   Pt will return to all work duties and houshold chores without incresaed symptoms  Frequency / Duration: Patient to be seen 2 times per week for 10-12 treatments.     Patient/ Caregiver education and instruction: self care, activity modification and exercises    [x]  Plan of care has been reviewed with JAYRO Arroyo, PT 12/21/2018 8:55 AM    ________________________________________________________________________    I certify that the above Therapy Services are being furnished while the patient is under my care. I agree with the treatment plan and certify that this therapy is necessary.     [de-identified] Signature:____________________  Date:____________Time: _________

## 2019-01-02 ENCOUNTER — HOSPITAL ENCOUNTER (OUTPATIENT)
Dept: PHYSICAL THERAPY | Age: 64
Discharge: HOME | End: 2019-01-02
Payer: COMMERCIAL

## 2019-01-02 PROCEDURE — 97140 MANUAL THERAPY 1/> REGIONS: CPT

## 2019-01-02 PROCEDURE — 97110 THERAPEUTIC EXERCISES: CPT

## 2019-01-02 NOTE — PROGRESS NOTES
PT DAILY TREATMENT NOTE - [facility] 2-15 Patient Name: Nelly Johnson Date:2019 : 1955 [x]  Patient  Verified Payor: BLUE CROSS / Plan: [facility] PPO / Product Type: PPO / In time:935  Out time:1023 Total Treatment Time (min): 48 Total Timed Codes (min): 48 
1:1 Treatment Time ( only): 32 Visit #: 2 Treatment Area: Other muscle spasm [M62.838] SUBJECTIVE Pain Level (0-10 scale): 0/10 Any medication changes, allergies to medications, adverse drug reactions, diagnosis change, or new procedure performed?: [x] No    [] Yes (see summary sheet for update) Subjective functional status/changes:   [] No changes reported Patient reports she has been experiencing neck pain fairly constantly, however no dizziness, fogginess, and greatly reduced headaches. Will experience low back pain when she wakes up and throughout her day at work. OBJECTIVE 33 min Therapeutic Exercise:  [x] See flow sheet :  
Rationale: increase ROM and increase strength to improve the patients ability to perform ADLs and reduce pain levels 15 min Manual Therapy: PROM with distraction, STM to levator and UT as well as paraspinals to address turgor and guarding Rationale: decrease pain, increase ROM, increase tissue extensibility and decrease trigger points to improve the patients ability to perform ADLs and reduce pain levels. With 
 [] TE 
 [] TA 
 [] neuro 
 [] other: Patient Education: [x] Review HEP [] Progressed/Changed HEP based on:  
[] positioning   [] body mechanics   [] transfers   [] heat/ice application   
[] other:   
 
Other Objective/Functional Measures: none noted Pain Level (0-10 scale) post treatment: 0/10 ASSESSMENT/Changes in Function:  
Poor scapular retraction activation, will performing upper trap compensation. Did not experience any concussion symptoms. Will continue to progress as tolerated. Patient will continue to benefit from skilled PT services to modify and progress therapeutic interventions, address functional mobility deficits, address ROM deficits, address strength deficits, analyze and address soft tissue restrictions, analyze and cue movement patterns and analyze and modify body mechanics/ergonomics to attain remaining goals. [x]  See Plan of Care 
[]  See progress note/recertification 
[]  See Discharge Summary Progress towards goals / Updated goals: 
Patient is progressing towards goals, will continue to improve tissue extensibility in order to reduce pain levels. PLAN [x]  Upgrade activities as tolerated     [x]  Continue plan of care [x]  Update interventions per flow sheet      
[]  Discharge due to:_ 
[]  Other:_ Laura Foley 1/2/2019  10:01 AM

## 2019-01-04 ENCOUNTER — HOSPITAL ENCOUNTER (OUTPATIENT)
Dept: PHYSICAL THERAPY | Age: 64
Discharge: HOME | End: 2019-01-04
Payer: COMMERCIAL

## 2019-01-04 PROCEDURE — 97140 MANUAL THERAPY 1/> REGIONS: CPT | Performed by: PHYSICAL THERAPIST

## 2019-01-04 PROCEDURE — 97110 THERAPEUTIC EXERCISES: CPT | Performed by: PHYSICAL THERAPIST

## 2019-01-04 NOTE — PROGRESS NOTES
PT DAILY TREATMENT NOTE - [facility] 2-15 Patient Name: Sofia Alert Date:2019 : 1955 [x]  Patient  Verified Payor: BLUE CROSS / Plan: [facility] PPO / Product Type: PPO / In time:831  Out time:925 Total Treatment Time (min): 54 Total Timed Codes (min): 54 
1:1 Treatment Time ([address] only): 25 Visit #: 3 Treatment Area: Other muscle spasm [M62.838] SUBJECTIVE Pain Level (0-10 scale): 2 Any medication changes, allergies to medications, adverse drug reactions, diagnosis change, or new procedure performed?: [x] No    [] Yes (see summary sheet for update) Subjective functional status/changes:   [] No changes reported Pt reports that the chin tuck exercise causes discomfort OBJECTIVE 39 min Therapeutic Exercise:  [x] See flow sheet :  
Rationale: increase ROM, increase strength and improve coordination to improve the patients ability to maintain better posture with all activity 15 min Manual Therapy: PROM with distraction, sub-occipital release, STM and MFR to trigger points in the UT, levator, and cervical paraspinals, lateral cervical glides at C5-7, cervical PA's C5-7 Rationale: decrease pain, increase ROM, increase tissue extensibility, decrease trigger points and increase postural awareness to improve the patients ability to complete all activity Pain Level (0-10 scale) post treatment: 2 
 
ASSESSMENT/Changes in Function: Pt was able to tolerate all activity. She continues to complain mainly of neck and shoulder pain. HA's have subsided but still gets them intermittently. She has drastically reduced her post concussion symptoms and was able to complete repeating backwards 3 and 4 digit numbers. 3 word recall was not assessed at the end of treatment. Pt was instructed to decrease the force with which she is completing chin tuck exercises as this was uncomfortable.   
Patient will continue to benefit from skilled PT services to modify and progress therapeutic interventions, address functional mobility deficits, address ROM deficits, address strength deficits, analyze and address soft tissue restrictions, analyze and cue movement patterns, analyze and modify body mechanics/ergonomics, assess and modify postural abnormalities and address imbalance/dizziness to attain remaining goals. []  See Plan of Care 
[]  See progress note/recertification 
[]  See Discharge Summary PLAN [x]  Upgrade activities as tolerated     [x]  Continue plan of care [x]  Update interventions per flow sheet      
[]  Discharge due to:_ 
[]  Other:_ uJsten Ward, PT 1/4/2019  9:52 AM

## 2019-01-08 ENCOUNTER — HOSPITAL ENCOUNTER (OUTPATIENT)
Dept: PHYSICAL THERAPY | Age: 64
Discharge: HOME | End: 2019-01-08
Payer: COMMERCIAL

## 2019-01-08 PROCEDURE — G8980 MOBILITY D/C STATUS: HCPCS | Performed by: PHYSICAL THERAPIST

## 2019-01-08 PROCEDURE — 97110 THERAPEUTIC EXERCISES: CPT | Performed by: PHYSICAL THERAPIST

## 2019-01-08 PROCEDURE — 97140 MANUAL THERAPY 1/> REGIONS: CPT | Performed by: PHYSICAL THERAPIST

## 2019-01-08 PROCEDURE — G8979 MOBILITY GOAL STATUS: HCPCS | Performed by: PHYSICAL THERAPIST

## 2019-01-08 NOTE — ANCILLARY DISCHARGE INSTRUCTIONS
[facility] Physical Therapy [address] ([address]), [address] [place] [address] [address] Phone: [phone] Fax: [phone] Discharge Summary 2-15 Patient name: Billie Valera  : 1955  Provider#: [id number] Referral source: Chandler Burroughs MD     
Medical/Treatment Diagnosis: Other muscle spasm [A17.468] Prior Hospitalization: see medical history Comorbidities: See Plan of Care Prior Level of Function: See Plan of Care Medications: Verified on Patient Summary List 
 
Start of Care: 18      Onset Date: Visits from Start of Care: 4     Missed Visits: 0 Reporting Period : 18 to 19 Assessment/Summary of care: Pt has completed 4 visits of therapy and has progressed very well. She no longer reports any post concussion symptoms and has decreased cervical pain. She has responded very nicely to postural re-education and modifications to work environment. She is still waiting to get a better chair for her desk. She has demonstrated ability to repeat back 3 and 4 digit numbers backwards, she has improved 3 word recall, and has no complaints of headaches today. At this time she has returned to all prior levels of function and will be DC'd from PT 
 
Goal: 
Short Term Goals: To be accomplished in 4-6 treatments: 
             Pt will be I with HEP MET Pt will decrease guarding and turgor to improve AROM of the spine to complete ADL's MET Pt will have 50% decrease in headaches MET Long Term Goals: To be accomplished in 10-12 treatments: 
             Pt will have 0/10 pain with all activity MET Pt will increase strength to improve posture with all activity MET Pt will return to all work duties and [address] chores without incresaed symptoms MET 
 
 
 
G-Codes (GP) Mobility  Goal  CI= 1-19%  D/C  CI= 1-19% The severity rating is based on clinical judgment and the FOTO Score score. 
 
RECOMMENDATIONS: 
[x]Discontinue therapy: [x]Patient has reached or is progressing toward set goals []Patient is non-compliant or has abdicated 
   []Due to lack of appreciable progress towards set goals []Other Marjorie Squires, PT 1/8/2019 11:34 AM

## 2019-01-08 NOTE — PROGRESS NOTES
PT DAILY TREATMENT NOTE - [facility] 2-15 Patient Name: Laurence Moss Date:2019 : 1955 [x]  Patient  Verified Payor: BLUE CROSS / Plan: [facility] PPO / Product Type: PPO / In time:832  Out time:915 Total Treatment Time (min): 43 Total Timed Codes (min): 43 
1:1 Treatment Time (MC only): 35 Visit #: 4 Treatment Area: Other muscle spasm [M62.838] SUBJECTIVE Pain Level (0-10 scale): 0 Any medication changes, allergies to medications, adverse drug reactions, diagnosis change, or new procedure performed?: [x] No    [] Yes (see summary sheet for update) Subjective functional status/changes:   [] No changes reported Pt reports that she has had no headaches since making small adjustments to HEP OBJECTIVE 38 min Therapeutic Exercise:  [x] See flow sheet :  
Rationale: increase ROM, increase strength, improve coordination, improve balance and increase proprioception to improve the patients ability to complete all activity 15 min Manual Therapy: PROM with distraction and sub-occipital release, STM to UT and levator Rationale: decrease pain, increase ROM, increase tissue extensibility, decrease trigger points and increase postural awareness to improve the patients ability to complete all activity with better posture Other Objective/Functional Measures: FOTO 86 Pain Level (0-10 scale) post treatment: 0 
 
ASSESSMENT/Changes in Function:  
Pt has progressed well and demonstrates improved ability to complete all activity without symptoms. She was able to complete 3 and 4 digit recall in reverse order,  She remembered 2/3 words at the end of treatment. Most significant is the decrease in all symptoms of concussion down to 0 on the scale. She will be DC'd at this time as she has returned to all activity and work duties Patient will continue to benefit from skilled PT services to modify and progress therapeutic interventions, address functional mobility deficits, address ROM deficits, address strength deficits, analyze and address soft tissue restrictions, analyze and cue movement patterns, analyze and modify body mechanics/ergonomics, assess and modify postural abnormalities and address imbalance/dizziness to attain remaining goals. []  See Plan of Care 
[]  See progress note/recertification 
[x]  See Discharge Summary PLAN 
[]  Upgrade activities as tolerated     []  Continue plan of care 
[]  Update interventions per flow sheet [x]  Discharge due to:meeting goals 
[]  Other:_ Rissa Jain, PT 1/8/2019  11:26 AM

## 2019-01-09 ENCOUNTER — OFFICE VISIT (OUTPATIENT)
Dept: INTERNAL MEDICINE CLINIC | Age: 64
End: 2019-01-09

## 2019-01-09 VITALS
HEART RATE: 84 BPM | DIASTOLIC BLOOD PRESSURE: 80 MMHG | WEIGHT: 108 LBS | OXYGEN SATURATION: 100 % | HEIGHT: 66 IN | RESPIRATION RATE: 16 BRPM | SYSTOLIC BLOOD PRESSURE: 121 MMHG | BODY MASS INDEX: 17.36 KG/M2 | TEMPERATURE: 98.3 F

## 2019-01-09 DIAGNOSIS — S06.0X0A CONCUSSION WITHOUT LOSS OF CONSCIOUSNESS, INITIAL ENCOUNTER: Primary | ICD-10-CM

## 2019-01-09 NOTE — PROGRESS NOTES
Chief Complaint Patient presents with  Concussion HPI:  she is a 61y.o. year old female who presents for follow up of concussion Patient states that she is doing much better and has not had a headache in weeks. Concussion Clinic - Initial Evaluation Referring Provider: Heath Tomlinson Date of Injury: 10/10/18 Mechanism of Injury: bumped head on trunk Removed from play?:Not applicable Amnesia: 
     Retrograde 0 minutes Anterograde 0 minutes Red Flags: 
Worsening headaches - Yes Severe neck pain - No 
Very sleepy - No 
Can't recognize people or places  - No 
Deteriorating consciousness  - No 
Increasing confusion or irritability - No 
Worsening vomiting  - No 
Slurred speech  - No 
Focal neurological signs - No 
Weakness/numbness in limbs  - No 
Severe behavior change - No 
Seizures (after the initial event) - No 
 
 
Initial Management: 
     Physical exertion: No 
     School attendance: No 
     Cognitive rest: Yes Imaging: Yes Previous Concussions (include dates, duration and any treatments): No 
 
Any increasing concussability:Not applicable Have subsequent concussions been more severe:Not applicable Developmental History: 
     Learning disability: No 
     ADHD: no Other developmental abnormalities No 
 
Medical history that may modify recovery: 
     Headaches: Yes Migraine Headaches: Yes Epilepsy: No 
     Thyroid disease: No 
     History of CNS infection: No 
 
Psychiatric history that may modify recovery: Anxiety: No 
     Depression: No 
     Sleep disorder: No 
 
Reviewed and agree with Nurse Note and duplicated in this note. Reviewed PmHx, RxHx, FmHx, SocHx, AllgHx and updated and dated in the chart. Family History Problem Relation Age of Onset  Other Mother   
     gallstones  Breast Cancer Mother 52  
     unilateral.  
Mahi Bolster Mother 62 Breast Cancer  Hypertension Father  Stroke Father 70  
 x2 CVAs and several TIAs  Kidney Disease Father  Asthma Paternal Uncle   
     x 2  
 Stroke Paternal Aunt  Diabetes Paternal Aunt  Heart Attack Paternal Aunt  Other Paternal Aunt   
     gout  Diabetes Paternal Aunt  Cancer Maternal Grandmother   
     uterine  Colon Cancer Maternal Grandmother 79  
 Cancer Maternal Aunt   
     lung  Cancer Other   
     maternal cousins  Breast Cancer Other 35  
     x 2.  bilaterally.  Alzheimer Maternal Aunt   
     x 2 Past Medical History:  
Diagnosis Date  Allergic rhinitis, cause unspecified  Arthritis   
 fingers and toes  Breast mass 1999 Right. Benign. due to Prempro. Dr. Isha Osborne  Chickenpox childhood  Dizziness 10/2011 Dr. Luis Hooper.  EBV positive mononucleosis syndrome 10/2010  
 positive titer.  Exposure to hepatitis B   
   Genital HSV  HA (headache)  Heart palpitations 09, 2018 Dr. Omar Hernandez  
 Hypoglycemia  Menopausal and postmenopausal disorder   MRSA infection 2011 Right hip. Txd Bactrim.  Osteopenia Dr. Felton Tyler. Dr. Sheyla Trejo.  Other and unspecified hyperlipidemia  Raynaud phenomenon  RLS (restless legs syndrome) 2008  Shingles teenager R arm  Vitamin D deficiency 2008 Social History Socioeconomic History  Marital status:  Spouse name: Not on file  Number of children: Not on file  Years of education: Not on file  Highest education level: Not on file Tobacco Use  Smoking status: Former Smoker Packs/day: 0.50 Years: 10.00 Pack years: 5.00 Types: Cigarettes Last attempt to quit: 1985 Years since quittin.0  Smokeless tobacco: Never Used Substance and Sexual Activity  Alcohol use: Yes Alcohol/week: 1.5 oz Types: 3 Standard drinks or equivalent per week Comment: Occasional  
 Drug use:  No  
  Sexual activity: Yes  
  Partners: Male Birth control/protection: None Review of Systems - negative except as listed above Objective:  
 
Vitals:  
 01/09/19 0913 Weight: 108 lb (49 kg) Height: 5' 6\" (1.676 m) Physical Examination: General appearance - alert, well appearing, and in no distress Eyes - pupils equal and reactive, extraocular eye movements intact Ears - bilateral TM's and external ear canals normal 
Nose - normal and patent, no erythema, discharge or polyps Mouth - mucous membranes moist, pharynx normal without lesions Neck - supple, no significant adenopathy NEUROLOGIC:   
 Cranial nerves II  XII: Intact Speech: Normal.   
 Face: Symmetrical 
 Extremities: Moving all equally, well perfused, and no edema. DTR: WNL, equal and symmetric Strength and sensation: Grossly intact. Gait: Normal 
  
Able to perform 3 word recall at 1 min and 5 min. Able to spell WORLD frontwards and backwards. Serial 7s intact. Long term memory intact (remembers phone number, address, friends names). Vestibular-Ocular Screening: 
Ocular-Motor: 
 Smooth Pursuits (\"H-Test\"):  Negative Saccades (Vertical/Horizontal):  Negative Convergence (<6cm):  Positive (needs new glasses) Accomodation (<6cm):  Positive (needs new glasses) Vestibular-Ocular: 
 Gaze Stability: (Vertical/Horizontal): Negative VOR cancellation:  Negative Balance Examination: 
 Romberg, compliant Foam 
   Eyes Open:  Negative Eyes Closed:  Positive SCAT3 Scores - Date  Symptom Score 12/19/18 4/8  
1/9/18 0 Comprehensive evaluation, administration and interpretation of SCAT3/Impact Neuro Psych testing completed at office visit today. Results scanned into chart. 1. Brief discussion with  10 minutes 2. Interview & brief neurological  
and balance exam with athlete 40 minutes 3. Brief interview with parent (if a minor) 15 minutes 4. ImPACT testing (patient alone) 30 minutes 5. Review results and discuss concussion  
and return to play with athlete and parent 20 minutes 6. Brief report (dictated) 20 minutes 7. Feedback with ATC next day 15 minutes 8. Feedback with parent two days later 15 minutes Spent 60min face-to-face, >50% spent on counseling & patient education. Assessment/ Plan:  
Diagnoses and all orders for this visit: 1. Concussion without loss of consciousness, initial encounter d/c 'd from concussion standpoint as she is back to baseline Follow-up Disposition: 
Return if symptoms worsen or fail to improve. 1. Rest.  No sports or exercise until cleared. 2. Concussions typically results in the rapid onset of short-lived impairment that resolves spontaneously over time (usually within 1 week - 1 month). 3.  Vestibular Rehab Referral - Eval/Therapy  yes Signs to watch for: 
Problems could arise over the first 24-48 hours. You should not be left alone and must go to a hospital at once it you: 1. Have a headache that gets worse. 2. Are very drowsy or cant be awakened (woken up). 3. Cant recognize people or places. 4. Have repeated vomiting 5. Behave unusually or seem confused; are very irritable. 6. Have seizures (arms and legs jerk uncontrollably). 7. Are unsteady on your feet; have slurred speech; vision or hearing changes. A structured, graded exertion protocol should be developed; individualized on the basis of sport, age and the concussion history of the athlete. Exercise or training should be commenced only after the athlete is clearly asymptomatic with physical and cognitive rest. Final decision for clearance to return to competition should ideally be made by a medical doctor. When returning athletes to play, they should follow a stepwise symptom-limited program, with stages of progression. For example: 1. rest until asymptomatic (physical and mental rest) 2. light aerobic exercise (e.g. stationary cycle) 3. sport-specific exercise 4. non-contact training drills (start light resistance training) 5. full contact training after medical clearance 6. return to competition (game play) There should be approximately 24 hours (or longer) for each stage and the athlete should return to stage 1 if symptoms recur. Resistance training should only be added in the later stages. Medical clearance should be given before return to play. Follow up in 14 Days, will consider Impact/Scat3 test at next visit I have discussed the diagnosis with the patient and the intended plan as seen in the above orders. The patient has received an after-visit summary and questions were answered concerning future plans. Medication Side Effects and Warnings were discussed with patient, Patient Labs were reviewed and or requested, and 
Patient Past Records were reviewed and or requested  yes Pt agrees to call or return to clinic and/or go to closest ER with any worsening of symptoms. This may include, but not limited to increased fever (>100.4) with NSAIDS or Tylenol, increased edema, confusion, rash, worsening of presenting symptoms.

## 2019-01-10 ENCOUNTER — APPOINTMENT (OUTPATIENT)
Dept: PHYSICAL THERAPY | Age: 64
End: 2019-01-10
Payer: COMMERCIAL

## 2019-01-15 ENCOUNTER — APPOINTMENT (OUTPATIENT)
Dept: PHYSICAL THERAPY | Age: 64
End: 2019-01-15
Payer: COMMERCIAL

## 2019-01-17 ENCOUNTER — APPOINTMENT (OUTPATIENT)
Dept: PHYSICAL THERAPY | Age: 64
End: 2019-01-17
Payer: COMMERCIAL

## 2019-01-22 ENCOUNTER — APPOINTMENT (OUTPATIENT)
Dept: PHYSICAL THERAPY | Age: 64
End: 2019-01-22
Payer: COMMERCIAL

## 2019-01-24 ENCOUNTER — APPOINTMENT (OUTPATIENT)
Dept: PHYSICAL THERAPY | Age: 64
End: 2019-01-24
Payer: COMMERCIAL

## 2019-01-26 LAB
25(OH)D3+25(OH)D2 SERPL-MCNC: 19.8 NG/ML (ref 30–100)
ALBUMIN/CREAT UR: <4.2 MG/G CREAT (ref 0–30)
APPEARANCE UR: CLEAR
BILIRUB UR QL STRIP: NEGATIVE
CHOLEST SERPL-MCNC: 157 MG/DL (ref 100–199)
COLOR UR: YELLOW
CREAT UR-MCNC: 71.4 MG/DL
GLUCOSE UR QL: NEGATIVE
HDLC SERPL-MCNC: 66 MG/DL
HGB UR QL STRIP: NEGATIVE
INTERPRETATION, 910389: NORMAL
KETONES UR QL STRIP: NEGATIVE
LDLC SERPL CALC-MCNC: 81 MG/DL (ref 0–99)
LEUKOCYTE ESTERASE UR QL STRIP: NEGATIVE
MICRO URNS: NORMAL
MICROALBUMIN UR-MCNC: <3 UG/ML
NITRITE UR QL STRIP: NEGATIVE
PH UR STRIP: 6.5 [PH] (ref 5–7.5)
PROT UR QL STRIP: NEGATIVE
SP GR UR: 1.01 (ref 1–1.03)
TRIGL SERPL-MCNC: 52 MG/DL (ref 0–149)
TSH SERPL DL<=0.005 MIU/L-ACNC: 2.02 UIU/ML (ref 0.45–4.5)
UROBILINOGEN UR STRIP-MCNC: 0.2 MG/DL (ref 0.2–1)
VLDLC SERPL CALC-MCNC: 10 MG/DL (ref 5–40)

## 2019-01-29 ENCOUNTER — APPOINTMENT (OUTPATIENT)
Dept: PHYSICAL THERAPY | Age: 64
End: 2019-01-29
Payer: COMMERCIAL

## 2019-01-31 ENCOUNTER — APPOINTMENT (OUTPATIENT)
Dept: PHYSICAL THERAPY | Age: 64
End: 2019-01-31
Payer: COMMERCIAL

## 2019-02-01 ENCOUNTER — OFFICE VISIT (OUTPATIENT)
Dept: FAMILY MEDICINE CLINIC | Age: 64
End: 2019-02-01

## 2019-02-01 VITALS
HEART RATE: 83 BPM | TEMPERATURE: 98.2 F | WEIGHT: 110 LBS | RESPIRATION RATE: 18 BRPM | HEIGHT: 66 IN | DIASTOLIC BLOOD PRESSURE: 72 MMHG | BODY MASS INDEX: 17.68 KG/M2 | SYSTOLIC BLOOD PRESSURE: 120 MMHG | OXYGEN SATURATION: 96 %

## 2019-02-01 DIAGNOSIS — I10 ESSENTIAL HYPERTENSION WITH GOAL BLOOD PRESSURE LESS THAN 140/90: ICD-10-CM

## 2019-02-01 DIAGNOSIS — M85.88 OSTEOPENIA OF OTHER SITE: ICD-10-CM

## 2019-02-01 DIAGNOSIS — Z20.828 EXPOSURE TO THE FLU: ICD-10-CM

## 2019-02-01 DIAGNOSIS — G44.309 POST-CONCUSSION HEADACHE: ICD-10-CM

## 2019-02-01 DIAGNOSIS — S46.812A TRAPEZIUS MUSCLE STRAIN, LEFT, INITIAL ENCOUNTER: ICD-10-CM

## 2019-02-01 DIAGNOSIS — R09.81 NASAL CONGESTION WITH RHINORRHEA: ICD-10-CM

## 2019-02-01 DIAGNOSIS — J00 ACUTE NASOPHARYNGITIS: Primary | ICD-10-CM

## 2019-02-01 DIAGNOSIS — J34.89 NASAL CONGESTION WITH RHINORRHEA: ICD-10-CM

## 2019-02-01 DIAGNOSIS — J39.2 THROAT IRRITATION: ICD-10-CM

## 2019-02-01 DIAGNOSIS — E55.9 VITAMIN D DEFICIENCY: ICD-10-CM

## 2019-02-01 DIAGNOSIS — S06.0X0D CLOSED HEAD INJURY WITH CONCUSSION, WITHOUT LOSS OF CONSCIOUSNESS, SUBSEQUENT ENCOUNTER: ICD-10-CM

## 2019-02-01 PROBLEM — S06.0XAA HEAD INJURY, CLOSED, WITH CONCUSSION: Status: ACTIVE | Noted: 2018-10-01

## 2019-02-01 LAB
FLUAV+FLUBV AG NOSE QL IA.RAPID: NEGATIVE POS/NEG
FLUAV+FLUBV AG NOSE QL IA.RAPID: NEGATIVE POS/NEG
S PYO AG THROAT QL: NEGATIVE
VALID INTERNAL CONTROL?: YES

## 2019-02-01 RX ORDER — ERGOCALCIFEROL 1.25 MG/1
50000 CAPSULE ORAL
Qty: 5 CAP | Refills: 2 | Status: SHIPPED | OUTPATIENT
Start: 2019-02-01 | End: 2019-08-09 | Stop reason: ALTCHOICE

## 2019-02-01 NOTE — PROGRESS NOTES
Amol Gambino  Identified pt with two pt identifiers(name and ). Chief Complaint Patient presents with  Blood Pressure Check  
  rm 12  Referral Follow Up  
   concussion specialist  
 
 
 
1. Have you been to the ER, urgent care clinic since your last visit? Hospitalized since your last visit? NO 
 
2. Have you seen or consulted any other health care providers outside of the [address] [place] [address] since your last visit? Include any pap smears or colon screening. NO Advance Care Planning In the event something were to happen to you and you were unable to speak on your behalf, do you have an Advance Directive/ Living Will in place stating your wishes? NO If yes, do we have a copy on file NO If no, would you like information YES 
 
My Chart My chart gives you direct online access to portions of the electronic medical record (EMR) where your doctor stores your health information (ie, lab results, appointment information, medications, immunizations, and more. It is free. Would you like to set up your my chart? NO 
 
[id number] Weight Metrics 2019 2019 2018 2018 10/17/2018 8/3/2018 2018 Weight 110 lb 108 lb 107 lb 1.6 oz 108 lb 9.6 oz 109 lb 12.6 oz 108 lb 3.2 oz 103 lb 1.6 oz BMI 17.75 kg/m2 17.43 kg/m2 17.29 kg/m2 17.53 kg/m2 17.72 kg/m2 17.46 kg/m2 16.64 kg/m2 Medication reconciliation up to date and corrected with patient at this time. Today's provider has been notified of reason for visit, vitals and flowsheets obtained on patients. Reviewed record in preparation for visit, huddled with provider and have obtained necessary documentation. Health Maintenance Due Topic  BREAST CANCER SCRN MAMMOGRAM   
 
 
Wt Readings from Last 3 Encounters:  
19 110 lb (49.9 kg) 19 108 lb (49 kg) 18 107 lb 1.6 oz (48.6 kg) Temp Readings from Last 3 Encounters:  
19 98.2 °F (36.8 °C) (Oral) 01/09/19 98.3 °F (36.8 °C) (Oral) 12/19/18 98.1 °F (36.7 °C) (Oral) BP Readings from Last 3 Encounters:  
02/01/19 120/72  
01/09/19 121/80  
12/19/18 143/83 Pulse Readings from Last 3 Encounters:  
02/01/19 83  
01/09/19 84  
12/19/18 70 Vitals:  
 02/01/19 1005 BP: 120/72 Pulse: 83 Resp: 18 Temp: 98.2 °F (36.8 °C) TempSrc: Oral  
SpO2: 96% Weight: 110 lb (49.9 kg) Height: 5' 6\" (1.676 m) PainSc:   2 PainLoc: Head Learning Assessment: 
:  
 
Learning Assessment 12/19/2018 2/3/2014 PRIMARY LEARNER Patient Patient HIGHEST LEVEL OF EDUCATION - PRIMARY LEARNER  - GRADUATED HIGH SCHOOL OR GED  
BARRIERS PRIMARY LEARNER NONE NONE  
CO-LEARNER CAREGIVER - No  
CO-LEARNER NAME - n/a PRIMARY LANGUAGE ENGLISH ENGLISH  
LEARNER PREFERENCE PRIMARY DEMONSTRATION DEMONSTRATION  
LEARNING SPECIAL TOPICS - no ANSWERED BY self Patient RELATIONSHIP SELF SELF Depression Screening: 
:  
 
PHQ over the last two weeks 2/1/2019 Little interest or pleasure in doing things Not at all Feeling down, depressed, irritable, or hopeless Not at all Total Score PHQ 2 0 Fall Risk Assessment: 
:  
 
Fall Risk Assessment, last 12 mths 2/1/2019 Able to walk? Yes Fall in past 12 months? No  
 
 
Abuse Screening: 
:  
 
Abuse Screening Questionnaire 2/1/2019 Do you ever feel afraid of your partner? Freda Mar Are you in a relationship with someone who physically or mentally threatens you? Freda Mar Is it safe for you to go home? Y  
 
 
ADL Screening: 
:  
 

## 2019-02-01 NOTE — PATIENT INSTRUCTIONS
Learning About Low Bone Density What is low bone density? Low bone density (sometimes called osteopenia) is a decrease in thickness, or density, in bones. That means the bones become thinner and weaker. It is much more common in women than in men. It is an early form of osteoporosis, a condition in which the bones are so thin and weak that they can break easily. It's important to know that low bone density is not a disease. It can happen normally with aging. Having low bone density means that there is a greater risk that you may get osteoporosis. It also means that you are more likely to break a bone than someone who does not have low bone density. But not everyone with low bone density gets osteoporosis or breaks a bone. Low bone density doesn't cause any symptoms. It's usually found with a type of X-ray called a bone density test. Low bone density means that your bone density result (T-score) is between 1.0 and 2.5. What increases your risk for low bone density? Things that increase your risk include: 
· Having a family history of osteoporosis. · Being thin. · Being white or . · Getting too little physical activity. · Smoking. · Drinking too much alcohol often. · Using certain medicines such as steroids. How can you prevent osteoporosis? There are things you can do to help prevent osteoporosis. Certain lifestyle changes will help slow the loss of bone density. · Eat food that has plenty of calcium and vitamin D. Yogurt, cheese, milk, and dark green vegetables are high in calcium. Eggs, fatty fish, cereal, and fortified milk are high in vitamin D. 
· Talk to your doctor about taking a calcium supplement that has vitamin D in it. · Get regular exercise. ? Do 30 minutes of weight-bearing exercise on most days of the week. Walking, jogging, stair climbing, and dancing are good choices. ? Do resistance exercises with weights or elastic bands 2 or 3 days a week. · Limit alcohol to 2 drinks a day for men and 1 drink a day for women. Too much alcohol can cause health problems. · Do not smoke. Smoking can make bones thin faster. If you need help quitting, talk to your doctor about stop-smoking programs and medicines. These can increase your chances of quitting for good. Prescription medicines are available for treating low bone density. But these are more often used to treat osteoporosis. Follow-up care is a key part of your treatment and safety. Be sure to make and go to all appointments, and call your doctor if you are having problems. It's also a good idea to know your test results and keep a list of the medicines you take. Where can you learn more? Go to http://marlo-estelita.info/. Enter P130 in the search box to learn more about \"Learning About Low Bone Density. \" Current as of: March 15, 2018 Content Version: 11.9 © 8553-0850 [employer]. Care instructions adapted under license by [employer] (which disclaims liability or warranty for this information). If you have questions about a medical condition or this instruction, always ask your healthcare professional. [place] [address] any warranty or liability for your use of this information. Resistance Training With Surgical Tubing: Exercises Your Care Instructions Here are some examples of exercises for resistance training. Start each exercise slowly. Ease off the exercise if you start to have pain. Your doctor or physical therapist will tell you when you can start these exercises and which ones will work best for you. How to do the exercises Side pull 1. Raise both arms overhead, palms of your hands facing forward. 2. Pull one arm down and to the side, bending your elbow as shown, and hold. 3. Slowly reach up again. Repeat with the other arm. 4. Repeat 8 to 12 times with each hand. 5. Rest for a minute, and repeat the exercise. Overhead pull 1. Raise both arms overhead, palms of your hands facing forward. 2. Tighten the tubing by slowly pulling both arms away from center, and hold. 3. Slowly return to the starting position with your arms straight up. 4. Repeat 8 to 12 times. 5. Rest for a minute, and repeat the exercise. Up-down pull 1. Raise both arms overhead. 2. Bend your elbows so that they are shoulder height, and hold the stretched tubing behind or in front of your head. 3. Slowly return to the starting position with your arms straight up. 4. Repeat 8 to 12 times. 5. Rest for a minute, and repeat the exercise. Chest-level pull 1. Raise your arms in front of you to chest level. Your elbows will be bent and held up at about shoulder height. 2. Pull your hands apart, stretching the tubing, and hold. Try to keep your hands up at your chest level, and do not pull your shoulders up toward your ears. 3. Slowly return to your starting position. 4. Repeat 8 to 12 times. 5. Rest for a minute, and repeat the exercise. Hip-level pull 1. Hold your hands at the level of your hips, or near your lap if you are sitting down. 2. Pull your hands apart, stretching the tubing, and hold. 3. Slowly return to your starting position. 4. Repeat 8 to 12 times. 5. Rest for a minute, and repeat the exercise. Follow-up care is a key part of your treatment and safety. Be sure to make and go to all appointments, and call your doctor if you are having problems. It's also a good idea to know your test results and keep a list of the medicines you take. Where can you learn more? Go to http://marlo-estelita.info/. Enter L943 in the search box to learn more about \"Resistance Training With Surgical Tubing: Exercises. \" Current as of: August 19, 2018 Content Version: 11.9 © 1827-9866 [employer].  Care instructions adapted under license by [employer] (which disclaims liability or warranty for this information). If you have questions about a medical condition or this instruction, always ask your healthcare professional. [place] [address] any warranty or liability for your use of this information.

## 2019-02-01 NOTE — PROGRESS NOTES
Verbal Order Read Back Per Nella Jha DO for AMB POC RAPID STREP TEST.  Kitty Lanza verified correct name and  with PCP

## 2019-02-01 NOTE — PROGRESS NOTES
HISTORY OF PRESENT ILLNESS Harper Sales is a 61 y.o. female presents with Blood Pressure Check (rm 12); Referral Follow Up ( concussion specialist); Cold Symptoms; and Results Agree with nurse note. Pt with hypertension and vit d deficiency presents to the office with a BP of 120/72. Pt requested to review results from 1/25/2019. Vit D 19.8, TSH 2.02, LDL 81, HDL 66, Trig 52. For BP, she uses Lisinopril 20 mg daily, tolerating well. Pt was referred to concussion specialist, Dr. Scar Arias and saw him on 12/11/2018 after hitting her head on her car. Dx'd concussion without LOC and post-concussion headache, trapezius muscle spasm, and impaired visual accomodation. Rx'd Elavil 10 mg. Referred to PT and opthalmology. She returned on 1/9/2019 and she was doing much better. No headaches in weeks. Pt is cleared, f/u prn. She is no longer using Elavil. She completed PT and was released with home exercises. Her L trapezius strain resolved with PT and Flexeril. She reports she went to optometrist, Dr. Franko Sanchez and received a new glasses rx but there were no changes due to the concussion. Pt complains of thick white mucus that started clearing up yesterday. Her nose is itchy. She had a L frontal headache yesterday. Denies blurry or double vision, chest paoin, chest tightness, fatigue, body ache. Denies fever, but she has had chills. She reports that everyone around her at work has been sick. She had a flu vaccine on 10/25/2018. POC strep and flu negative. She uses Singular, Claritin, and flonase prn. Pt with heartburn. She uses Protonix prn but has needed it more recently. She has been eating more foods at work and she isn't sure what is triggering it. She has decreased her Coca cola to a small can every other day instead of daily. Pt had documentation completed at previous office visit for an ergonomic chair at work.  The documentation was not completed in time so she has to start the process over. She will fax a copy of the new form and her previous form to the office. Health Maintenance Pt reports she had a recent mammogram at [facility]. DEXA on 8/21/2018 showed osteopenia and she has seen Dr. America Dockery and Dr. Kerry García previously. She is agreeable with a referral to an endocrinologist today. Written by shawna Mcdaniel, as dictated by Dr. Fab Live DO. 
 
ROS Review of Systems negative except as noted above in HPI. ALLERGIES:   
Allergies Allergen Reactions  Prempro [Conj Estrog-Medroxyprogest Ace] Other (comments) Benign Right breast mass. CURRENT MEDICATIONS:   
Outpatient Medications Marked as Taking for the 2/1/19 encounter (Office Visit) with Galo Antony DO Medication Sig Dispense Refill  ergocalciferol (ERGOCALCIFEROL) 50,000 unit capsule Take 1 Cap by mouth every seven (7) days. 5 Cap 2  
 lisinopril (PRINIVIL, ZESTRIL) 20 mg tablet Take 1 Tab by mouth daily. 90 Tab 3  
 simvastatin (ZOCOR) 40 mg tablet Take 1 Tab by mouth nightly. 90 Tab 3  
 aspirin delayed-release 81 mg tablet Take 1 Tab by mouth daily. 90 Tab 3  
 fluticasone (FLONASE) 50 mcg/actuation nasal spray 2 Sprays by Both Nostrils route daily. 1 Bottle 0  
 cyanocobalamin (VITAMIN B-12) 1,000 mcg tablet Take 1,000 mcg by mouth daily.  omega-3 fatty acids-vitamin e (FISH OIL) 1,000 mg Cap Take  by mouth daily.  MULTIVITAMINS (MULTIVITAMIN PO) Take  by mouth. Takes 3 times/week PAST MEDICAL HISTORY:   
Past Medical History:  
Diagnosis Date  Allergic rhinitis, cause unspecified  Arthritis   
 fingers and toes  Breast mass 06/1999 Right. Benign. due to Prempro. Dr. Lacy Wiggins  Chickenpox childhood  Dizziness 10/2011 Dr. Jordan Saeed.  EBV positive mononucleosis syndrome 10/2010  
 positive titer.  Exposure to hepatitis B   
   Genital HSV  HA (headache)  Head injury, closed, with concussion 10/2018  
 due to hitting head on trunk of vehicle. Dr. Apolinar Jack. Txd w PT.  
 Heart palpitations 05/21/09, 01/2018 Dr. Brodie Mane  
 Hypertension  Hypoglycemia  Menopausal and postmenopausal disorder 1999  MRSA infection 12/2011 Right hip. Txd Bactrim.  Osteopenia Dr. Yoav Ashley. Dr. Alley Hernandez.  Other and unspecified hyperlipidemia  Raynaud phenomenon  RLS (restless legs syndrome) 2008  Shingles teenager R arm  Vitamin D deficiency 2008 PAST SURGICAL HISTORY:   
Past Surgical History:  
Procedure Laterality Date  COLONOSCOPY  09/19/2016 Dr. Kitty Sigala. due q 10 yrs. Mac Juan Alberto Benign. Dr. Araceli Mckeon  HX ORTHOPAEDIC Right 10/2012 Right arm. BENIGN TUMOR REMOVED. Dr. Jere Mckeon FAMILY HISTORY:   
Family History Problem Relation Age of Onset  Other Mother   
     gallstones  Breast Cancer Mother 52  
     unilateral.  
Celso Paddock Mother 62 Breast Cancer  Hypertension Father  Stroke Father 70  
     x2 CVAs and several TIAs  Kidney Disease Father  Asthma Paternal Uncle   
     x 2  
 Stroke Paternal Aunt  Diabetes Paternal Aunt  Heart Attack Paternal Aunt  Other Paternal Aunt   
     gout  Diabetes Paternal Aunt  Cancer Maternal Grandmother   
     uterine  Colon Cancer Maternal Grandmother 79  
 Cancer Maternal Aunt   
     lung  Cancer Other   
     maternal cousins  Breast Cancer Other 35  
     x 2.  bilaterally.  Alzheimer Maternal Aunt   
     x 2 SOCIAL HISTORY:   
Social History Socioeconomic History  Marital status:  Spouse name: Not on file  Number of children: Not on file  Years of education: Not on file  Highest education level: Not on file Tobacco Use  Smoking status: Former Smoker Packs/day: 0.50 Years: 10.00 Pack years: 5.00 Types: Cigarettes Last attempt to quit: 1985 Years since quittin.1  Smokeless tobacco: Never Used Substance and Sexual Activity  Alcohol use: Yes Alcohol/week: 1.5 oz Types: 3 Standard drinks or equivalent per week Comment: Occasional  
 Drug use: No  
 Sexual activity: Yes  
  Partners: Male Birth control/protection: None IMMUNIZATIONS:   
Immunization History Administered Date(s) Administered  Influenza Vaccine 10/01/2014, 10/01/2016, 10/06/2017, 10/05/2018  Influenza Vaccine Split 10/18/2010, 10/21/2011  TD Vaccine 2004  Tdap 2017 PHYSICAL EXAMINATION Vital Signs Visit Vitals /72 (BP 1 Location: Left arm, BP Patient Position: Sitting) Pulse 83 Temp 98.2 °F (36.8 °C) (Oral) Resp 18 Ht 5' 6\" (1.676 m) Wt 110 lb (49.9 kg) SpO2 96% BMI 17.75 kg/m² Weight Metrics 2019 2019 2018 2018 10/17/2018 8/3/2018 2018 Weight 110 lb 108 lb 107 lb 1.6 oz 108 lb 9.6 oz 109 lb 12.6 oz 108 lb 3.2 oz 103 lb 1.6 oz BMI 17.75 kg/m2 17.43 kg/m2 17.29 kg/m2 17.53 kg/m2 17.72 kg/m2 17.46 kg/m2 16.64 kg/m2 General appearance - Well nourished. Well appearing. Well developed. No acute distress. Talks nasally. Head - Normocephalic. Atraumatic. Non tender sinuses x 4. Eyes - pupils equal and reactive. Extraocular eye movements intact. Sclera anicteric. Mildly injected sclera. Ears - Hearing is grossly normal bilaterally. Nose - normal and patent. No polyps noted. No erythema. No discharge. Mouth - mucous membranes with adequate moisture. Posterior pharynx normal with cobblestone appearance. No erythema, white exudate or obstruction. Neck - supple. Midline trachea. No carotid bruits noted bilaterally. No thyromegaly noted. Chest - clear to auscultation bilaterally anteriorly and posteriorly. No wheezes. No rales or rhonchi. Breath sounds are symmetrical bilaterally. Unlabored respirations. Heart - normal rate. Regular rhythm. Normal S1, S2. No murmur noted. No rubs, clicks or gallops noted. Abdomen - soft and nondistended. No masses or organomegaly. No rebound, rigidity or guarding. Bowel sounds normal x 4 quadrants. No tenderness noted. Neurological - awake, alert and oriented to person, place, and time and event. Cranial nerves II through XII intact. Clear speech. Muscle strength is +5/5 x 4 extremities. Sensation is intact to light touch bilaterally. Steady gait. Heme/Lymph - peripheral pulses normal x 4 extremities. No peripheral edema is noted. Musculoskeletal - Intact x 4 extremities. Full ROM x 4 extremities. No pain with movement. Back exam - normal range of motion. No pain on palpation of the spinous processes in the cervical, thoracic, lumbar, sacral regions. No CVA tenderness. Skin - no rashes, erythema, ecchymosis, lacerations, abrasions, suspicious moles noted Psychological -   normal behavior, dress and thought processes. Good insight. Good eye contact. Normal affect. Appropriate mood. Normal speech. DATA REVIEWED Lab Results Component Value Date/Time WBC 5.2 01/10/2018 01:14 PM  
 WBC 3.8 (L) 05/29/2012 09:00 AM  
 HGB 12.4 01/10/2018 01:14 PM  
 HCT 37.3 01/10/2018 01:14 PM  
 PLATELET 778 58/04/8223 01:14 PM  
 MCV 97.9 01/10/2018 01:14 PM  
 
Lab Results Component Value Date/Time  Sodium 142 01/10/2018 01:14 PM  
 Potassium 3.5 01/10/2018 01:14 PM  
 Chloride 106 01/10/2018 01:14 PM  
 CO2 30 01/10/2018 01:14 PM  
 Anion gap 6 01/10/2018 01:14 PM  
 Glucose 84 01/10/2018 01:14 PM  
 BUN 11 01/10/2018 01:14 PM  
 Creatinine 0.76 01/10/2018 01:14 PM  
 BUN/Creatinine ratio 14 01/10/2018 01:14 PM  
 GFR est AA >60 01/10/2018 01:14 PM  
 GFR est non-AA >60 01/10/2018 01:14 PM  
 Calcium 8.9 01/10/2018 01:14 PM  
 Bilirubin, total 0.5 01/10/2018 01:14 PM  
 AST (SGOT) 14 (L) 01/10/2018 01:14 PM  
 Alk. phosphatase 80 01/10/2018 01:14 PM  
 Protein, total 7.4 01/10/2018 01:14 PM  
 Albumin 4.0 01/10/2018 01:14 PM  
 Globulin 3.4 01/10/2018 01:14 PM  
 A-G Ratio 1.2 01/10/2018 01:14 PM  
 ALT (SGPT) 26 01/10/2018 01:14 PM  
 
Lab Results Component Value Date/Time Cholesterol, total 157 01/25/2019 10:59 AM  
 HDL Cholesterol 66 01/25/2019 10:59 AM  
 LDL, calculated 81 01/25/2019 10:59 AM  
 VLDL, calculated 10 01/25/2019 10:59 AM  
 Triglyceride 52 01/25/2019 10:59 AM  
 CHOL/HDL Ratio 3.2 10/18/2010 11:28 AM  
 
Lab Results Component Value Date/Time Vitamin D 25-Hydroxy 15.1 (L) 04/11/2011 09:00 AM  
 VITAMIN D, 25-HYDROXY 19.8 (L) 01/25/2019 10:59 AM  
   
Lab Results Component Value Date/Time Hemoglobin A1c 5.1 03/21/2017 08:11 AM  
 
Lab Results Component Value Date/Time TSH 2.020 01/25/2019 10:59 AM  
 
 
Lab Results Component Value Date/Time Microalb/Creat ratio (ug/mg creat.) <4.2 01/25/2019 03:46 PM  
 
 
 
ASSESSMENT and PLAN 
 
  ICD-10-CM ICD-9-CM 1. Acute nasopharyngitis J00 460 2. Closed head injury with concussion, without loss of consciousness, subsequent encounter S06.0X0D V58.89   
 due to hitting head on top of the trunk while removing groceries 10/2018, resolved after PT and Elavil 10 mg q hs 3. Post-concussion headache G44.309 339.20   
 vs eye strain, resolved after Elavil, PT and getting new eyeglasses 4. Essential hypertension with goal blood pressure less than 140/90 I10 401.9 5. Exposure to the flu Z20.828 V01.79 AMB POC VITALY INFLUENZA A/B TEST 6. Throat irritation J39.2 478.29 AMB POC RAPID STREP TEST  
   AMB POC VITALY INFLUENZA A/B TEST 7. Trapezius muscle strain, left, initial encounter S46.812A 840.8   
 due to hitting head on trunk of vehicle, resolved with PT and Flexeril 8. Nasal congestion with rhinorrhea J34.89 478.19 AMB POC VITALY INFLUENZA A/B TEST  
 due to URI vs allergies vs ?Flu vs other 9. Vitamin D deficiency E55.9 268.9 ergocalciferol (ERGOCALCIFEROL) 50,000 unit capsule 10. Osteopenia of other site M85.88 733.90 REFERRAL TO ENDOCRINOLOGY Chart reviewed and updated. Continue current medications and care. Restart Singulair, Claritin, and Flonase daily. Start Rx Vitamin D [id number] weekly x3 months and take Vitamin D 5000IU daily when finished with rx. Prescriptions written and sent to pharmacy; medication side effects discussed. Vit D 50,000IU. Most recent tests reviewed from 1/25/2019. Recent office visit notes from Dr. Marlene Pressley reviewed. Get recent office visit notes from Dr. Cuco Lopez. Advised pt to sign release. Counseled patient on health concerns:  Nasal congestion, vit d deficiency, osteopenia, BP, concussion, headache, muscle strain. Avoid heartburn triggers. Relevant handouts given and discussed with patient. Immunizations noted. Offered empathy, support, legitimation, prayers, partnership to patient. Praised patient for progress. Follow-up Disposition: 
Return in about 6 months (around 8/1/2019) for blood pressure, referral follow up, results. Patient was offered a choice/choices in the treatment plan today. Patient expresses understanding of the plan and agrees with recommendations. Written by shawna Isaac, as dictated by Dr. Clovis Gonzalez DO. Documentation True and Accepted by Ruy Boykin. Delia Pugh. Patient Instructions Learning About Low Bone Density What is low bone density? Low bone density (sometimes called osteopenia) is a decrease in thickness, or density, in bones. That means the bones become thinner and weaker. It is much more common in women than in men. It is an early form of osteoporosis, a condition in which the bones are so thin and weak that they can break easily. It's important to know that low bone density is not a disease. It can happen normally with aging.  Having low bone density means that there is a greater risk that you may get osteoporosis. It also means that you are more likely to break a bone than someone who does not have low bone density. But not everyone with low bone density gets osteoporosis or breaks a bone. Low bone density doesn't cause any symptoms. It's usually found with a type of X-ray called a bone density test. Low bone density means that your bone density result (T-score) is between 1.0 and 2.5. What increases your risk for low bone density? Things that increase your risk include: 
· Having a family history of osteoporosis. · Being thin. · Being white or . · Getting too little physical activity. · Smoking. · Drinking too much alcohol often. · Using certain medicines such as steroids. How can you prevent osteoporosis? There are things you can do to help prevent osteoporosis. Certain lifestyle changes will help slow the loss of bone density. · Eat food that has plenty of calcium and vitamin D. Yogurt, cheese, milk, and dark green vegetables are high in calcium. Eggs, fatty fish, cereal, and fortified milk are high in vitamin D. 
· Talk to your doctor about taking a calcium supplement that has vitamin D in it. · Get regular exercise. ? Do 30 minutes of weight-bearing exercise on most days of the week. Walking, jogging, stair climbing, and dancing are good choices. ? Do resistance exercises with weights or elastic bands 2 or 3 days a week. · Limit alcohol to 2 drinks a day for men and 1 drink a day for women. Too much alcohol can cause health problems. · Do not smoke. Smoking can make bones thin faster. If you need help quitting, talk to your doctor about stop-smoking programs and medicines. These can increase your chances of quitting for good. Prescription medicines are available for treating low bone density. But these are more often used to treat osteoporosis. Follow-up care is a key part of your treatment and safety.  Be sure to make and go to all appointments, and call your doctor if you are having problems. It's also a good idea to know your test results and keep a list of the medicines you take. Where can you learn more? Go to http://marlo-estelita.info/. Enter S662 in the search box to learn more about \"Learning About Low Bone Density. \" Current as of: March 15, 2018 Content Version: 11.9 © 7545-2285 [employer]. Care instructions adapted under license by [employer] (which disclaims liability or warranty for this information). If you have questions about a medical condition or this instruction, always ask your healthcare professional. [address] any warranty or liability for your use of this information. Resistance Training With Surgical Tubing: Exercises Your Care Instructions Here are some examples of exercises for resistance training. Start each exercise slowly. Ease off the exercise if you start to have pain. Your doctor or physical therapist will tell you when you can start these exercises and which ones will work best for you. How to do the exercises Side pull 1. Raise both arms overhead, palms of your hands facing forward. 2. Pull one arm down and to the side, bending your elbow as shown, and hold. 3. Slowly reach up again. Repeat with the other arm. 4. Repeat 8 to 12 times with each hand. 5. Rest for a minute, and repeat the exercise. Overhead pull 1. Raise both arms overhead, palms of your hands facing forward. 2. Tighten the tubing by slowly pulling both arms away from center, and hold. 3. Slowly return to the starting position with your arms straight up. 4. Repeat 8 to 12 times. 5. Rest for a minute, and repeat the exercise. Up-down pull 1. Raise both arms overhead. 2. Bend your elbows so that they are shoulder height, and hold the stretched tubing behind or in front of your head. 3. Slowly return to the starting position with your arms straight up. 4. Repeat 8 to 12 times. 5. Rest for a minute, and repeat the exercise. Chest-level pull 1. Raise your arms in front of you to chest level. Your elbows will be bent and held up at about shoulder height. 2. Pull your hands apart, stretching the tubing, and hold. Try to keep your hands up at your chest level, and do not pull your shoulders up toward your ears. 3. Slowly return to your starting position. 4. Repeat 8 to 12 times. 5. Rest for a minute, and repeat the exercise. Hip-level pull 1. Hold your hands at the level of your hips, or near your lap if you are sitting down. 2. Pull your hands apart, stretching the tubing, and hold. 3. Slowly return to your starting position. 4. Repeat 8 to 12 times. 5. Rest for a minute, and repeat the exercise. Follow-up care is a key part of your treatment and safety. Be sure to make and go to all appointments, and call your doctor if you are having problems. It's also a good idea to know your test results and keep a list of the medicines you take. Where can you learn more? Go to http://marlo-estelita.info/. Enter Z389 in the search box to learn more about \"Resistance Training With Surgical Tubing: Exercises. \" Current as of: August 19, 2018 Content Version: 11.9 © 7567-1305 [employer]. Care instructions adapted under license by [employer] (which disclaims liability or warranty for this information). If you have questions about a medical condition or this instruction, always ask your healthcare professional. [address] any warranty or liability for your use of this information.

## 2019-03-14 ENCOUNTER — TELEPHONE (OUTPATIENT)
Dept: FAMILY MEDICINE CLINIC | Age: 64
End: 2019-03-14

## 2019-03-15 NOTE — TELEPHONE ENCOUNTER
Spoke to patient. Verified with two patient identifiers. Pt was asked if an appointment can be made to fill out the paperwork for [employer] certification for accomodations for an ergonomic chair for work. Pt has agreed to work out the time and pay the co-pay for that day. Patient states that she will call back to arrange an appointment. Patient verbalized understanding of information, and has no further questions at this time.

## 2019-03-17 NOTE — TELEPHONE ENCOUNTER
Spoke with patient after obtaining 2 patient identifiers  She was updated that her FMLA  Forms had been faxed back. No further questions noted at this time. Alert-The patient is alert, awake and responds to voice. The patient is oriented to time, place, and person. The triage nurse is able to obtain subjective information.

## 2019-03-28 ENCOUNTER — OFFICE VISIT (OUTPATIENT)
Dept: ENDOCRINOLOGY | Age: 64
End: 2019-03-28

## 2019-03-28 VITALS
BODY MASS INDEX: 17.58 KG/M2 | WEIGHT: 109.4 LBS | SYSTOLIC BLOOD PRESSURE: 102 MMHG | HEART RATE: 78 BPM | DIASTOLIC BLOOD PRESSURE: 75 MMHG | HEIGHT: 66 IN

## 2019-03-28 DIAGNOSIS — E55.9 HYPOVITAMINOSIS D: ICD-10-CM

## 2019-03-28 DIAGNOSIS — M85.80 OSTEOPENIA, UNSPECIFIED LOCATION: Primary | ICD-10-CM

## 2019-03-28 NOTE — PATIENT INSTRUCTIONS
Learning About Low Bone Density  What is low bone density? Low bone density (sometimes called osteopenia) is a decrease in thickness, or density, in bones. That means the bones become thinner and weaker. It is much more common in women than in men. It is an early form of osteoporosis, a condition in which the bones are so thin and weak that they can break easily. It's important to know that low bone density is not a disease. It can happen normally with aging. Having low bone density means that there is a greater risk that you may get osteoporosis. It also means that you are more likely to break a bone than someone who does not have low bone density. But not everyone with low bone density gets osteoporosis or breaks a bone. Low bone density doesn't cause any symptoms. It's usually found with a type of X-ray called a bone density test. Low bone density means that your bone density result (T-score) is between -1.0 and -2.5. What increases your risk for low bone density? Things that increase your risk include:  · Having a family history of osteoporosis. · Being thin. · Being white or . · Getting too little physical activity. · Smoking. · Drinking too much alcohol often. · Using certain medicines such as steroids. How can you prevent osteoporosis? There are things you can do to help prevent osteoporosis. Certain lifestyle changes will help slow the loss of bone density. · Eat food that has plenty of calcium and vitamin D. Yogurt, cheese, milk, and dark green vegetables are high in calcium. Eggs, fatty fish, cereal, and fortified milk are high in vitamin D.  · Talk to your doctor about taking a calcium supplement that has vitamin D in it. · Get regular exercise. ? Do 30 minutes of weight-bearing exercise on most days of the week. Walking, jogging, stair climbing, and dancing are good choices. ? Do resistance exercises with weights or elastic bands 2 or 3 days a week.   · Limit alcohol to 2 drinks a day for men and 1 drink a day for women. Too much alcohol can cause health problems. · Do not smoke. Smoking can make bones thin faster. If you need help quitting, talk to your doctor about stop-smoking programs and medicines. These can increase your chances of quitting for good. Prescription medicines are available for treating low bone density. But these are more often used to treat osteoporosis. Follow-up care is a key part of your treatment and safety. Be sure to make and go to all appointments, and call your doctor if you are having problems. It's also a good idea to know your test results and keep a list of the medicines you take. Where can you learn more? Go to http://marlo-estelita.info/. Enter E821 in the search box to learn more about \"Learning About Low Bone Density. \"  Current as of: March 15, 2018  Content Version: 11.9  © 3565-4074 [employer]. Care instructions adapted under license by [employer] (which disclaims liability or warranty for this information). If you have questions about a medical condition or this instruction, always ask your healthcare professional. [address] any warranty or liability for your use of this information.
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
## 2019-03-29 LAB — 25(OH)D3+25(OH)D2 SERPL-MCNC: 54.9 NG/ML (ref 30–100)

## 2019-03-29 NOTE — PROGRESS NOTES
Spoke with pt regarding her lab. Her Vitamin D has responded well the the Weekly Ergocalciferol. Pt instructed to finish the prescription she has and then switch to cholecalciferol 2000 units daily. Pt voices understanding and agreement with the plan.

## 2019-04-18 RX ORDER — AMITRIPTYLINE HYDROCHLORIDE 10 MG/1
TABLET, FILM COATED ORAL
Qty: 30 TAB | Refills: 3 | Status: SHIPPED | OUTPATIENT
Start: 2019-04-18 | End: 2019-08-13 | Stop reason: SDUPTHER

## 2019-08-09 ENCOUNTER — OFFICE VISIT (OUTPATIENT)
Dept: FAMILY MEDICINE CLINIC | Age: 64
End: 2019-08-09

## 2019-08-09 VITALS
DIASTOLIC BLOOD PRESSURE: 74 MMHG | RESPIRATION RATE: 18 BRPM | BODY MASS INDEX: 17.29 KG/M2 | WEIGHT: 107.6 LBS | TEMPERATURE: 97.9 F | HEART RATE: 59 BPM | HEIGHT: 66 IN | SYSTOLIC BLOOD PRESSURE: 115 MMHG | OXYGEN SATURATION: 99 %

## 2019-08-09 DIAGNOSIS — R20.2 PARESTHESIA OF BOTH HANDS: ICD-10-CM

## 2019-08-09 DIAGNOSIS — Z82.49 FAMILY HISTORY OF HEART ATTACK: ICD-10-CM

## 2019-08-09 DIAGNOSIS — R42 DIZZINESS: ICD-10-CM

## 2019-08-09 DIAGNOSIS — R07.89 OTHER CHEST PAIN: ICD-10-CM

## 2019-08-09 DIAGNOSIS — M79.601 RIGHT ARM PAIN: ICD-10-CM

## 2019-08-09 DIAGNOSIS — I73.00 RAYNAUD'S PHENOMENON WITHOUT GANGRENE: ICD-10-CM

## 2019-08-09 DIAGNOSIS — Z84.1 FAMILY HISTORY OF KIDNEY DISEASE: ICD-10-CM

## 2019-08-09 DIAGNOSIS — Z82.0 FAMILY HISTORY OF ALZHEIMER'S DISEASE: ICD-10-CM

## 2019-08-09 DIAGNOSIS — E55.9 VITAMIN D DEFICIENCY: ICD-10-CM

## 2019-08-09 DIAGNOSIS — G44.309 POST-CONCUSSION HEADACHE: ICD-10-CM

## 2019-08-09 DIAGNOSIS — I10 ESSENTIAL HYPERTENSION WITH GOAL BLOOD PRESSURE LESS THAN 140/90: Primary | ICD-10-CM

## 2019-08-09 DIAGNOSIS — Z80.3 FAMILY HISTORY OF BREAST CANCER: ICD-10-CM

## 2019-08-09 DIAGNOSIS — R00.2 HEART PALPITATIONS: ICD-10-CM

## 2019-08-09 DIAGNOSIS — E78.5 DYSLIPIDEMIA: ICD-10-CM

## 2019-08-09 DIAGNOSIS — Z80.0 FAMILY HISTORY OF COLON CANCER: ICD-10-CM

## 2019-08-09 DIAGNOSIS — R23.2 HOT FLASHES: ICD-10-CM

## 2019-08-09 DIAGNOSIS — M79.641 PAIN OF RIGHT HAND: ICD-10-CM

## 2019-08-09 RX ORDER — MELOXICAM 7.5 MG/1
15 TABLET ORAL DAILY
Qty: 30 TAB | Refills: 2 | Status: SHIPPED | OUTPATIENT
Start: 2019-08-09 | End: 2019-09-27 | Stop reason: SDUPTHER

## 2019-08-09 NOTE — PROGRESS NOTES
HISTORY OF PRESENT ILLNESS  Salvador Holt is a 61 y.o. female presents with Blood Pressure Check (Rm 15); Referral Follow Up; Arm Pain; Hand Pain; Dizziness; and Medication Refill    Agree with nurse note. Pt with hypertension, dyslipidemia, vit d deficiency, and family hx of heart attack, Alzheimer's disease, kidney disease, breast ca, and colon ca presents to the office with a BP of 115/74. For BP, she uses Lisinopril 20 mg daily, tolerating well. Patient denies vision changes, headaches, dizziness, chest pain, SOB, or swelling. She takes Aspirin 81 mg daily. Pt saw her eye doctor Dr. Bienvenido Green on 12/27/2018. No signs of glaucoma. R handed pt with hx of arthritis in fingers and toes and Raynaud's complains of pain in her complains of R shoulder, R elbow, and finger pain x 2 months. She describes the pain in her fingers as a stiffness in the morning. She has intermittent shooting pain through the R shoulder down into the upper arm that lasts for a few minutes. It does not go into the fingers. She does report the bone in her chest aches occasionally. She denies any neck pain. She does not break out in a sweat and it does not always occur with the arm pain. Pt took Mobic 7.5 mg in 2012 arthritis requests a prescirption. For Raynaud's, pt will rub her fingers together when they start to get numb or turn purple with relief. Pt complains of intermittent light headedness that occurs daily. To recall, pt had a concussion in 12/2018 and saw concussion specialist, Dr. Linda Castro. Pt reports she takes Flexeril 5 mg whenever she gets headaches with relief. Pt had heart palpitations in the past, but they have resolved. Pt requested to review results from 1/25/2019. Vit D 19.8, TSH 2.02, LDL 81, HDL 66, Trig 52. Vit d from 3/28/2019 was 54.9.      Written by shawna Esposito, as dictated by Dr. Estella Contreras DO.    ROS    Review of Systems negative except as noted above in HPI.    ALLERGIES:    Allergies   Allergen Reactions    Prempro [Conj Estrog-Medroxyprogest Ace] Other (comments)     Benign Right breast mass. CURRENT MEDICATIONS:    Outpatient Medications Marked as Taking for the 8/9/19 encounter (Office Visit) with Latoya Key, DO   Medication Sig Dispense Refill    meloxicam (MOBIC) 7.5 mg tablet Take 2 Tabs by mouth daily. Indications: joint damage causing pain and loss of function 30 Tab 2    simvastatin (ZOCOR) 40 mg tablet Take 1 Tab by mouth nightly. 90 Tab 3    Blood Pressure Test Kit-Wrist kit Check bp daily and as needed Dx:  Hypertension I10  Indications: hypertension 1 Kit 0    aspirin delayed-release 81 mg tablet Take 1 Tab by mouth daily. 90 Tab 3    cyanocobalamin (VITAMIN B-12) 1,000 mcg tablet Take 1,000 mcg by mouth daily.  omega-3 fatty acids-vitamin e (FISH OIL) 1,000 mg Cap Take  by mouth daily.  MULTIVITAMINS (MULTIVITAMIN PO) Take  by mouth. Takes 3 times/week         PAST MEDICAL HISTORY:    Past Medical History:   Diagnosis Date    Allergic rhinitis, cause unspecified     Arthritis     fingers and toes    Breast mass 06/1999    Right. Benign. due to Prempro. Dr. Jorge Jordan    Chickenpox childhood    Dizziness 10/2011    Dr. Mathew Morgan.  EBV positive mononucleosis syndrome 10/2010    positive titer.  Exposure to hepatitis B         Genital HSV     Head injury, closed, with concussion 10/2018    due to hitting head on trunk of vehicle. Dr. Luci Hernandez. Txd w PT.    Heart palpitations 05/21/09, 01/2018    Dr. Maria R Julian    Hypertension     Hypoglycemia     Menopausal and postmenopausal disorder 1999    MRSA infection 12/2011    Right hip. Txd Bactrim.  Osteopenia     Dr. Jessica Bishop. Dr. Jame Garza.     Other and unspecified hyperlipidemia     Raynaud phenomenon     RLS (restless legs syndrome) 2008    Shingles teenager    R arm    Vitamin D deficiency 2008       PAST SURGICAL HISTORY:    Past Surgical History:   Procedure Laterality Date    COLONOSCOPY  2016    Dr. Wilner Villa. due q 10 yrs.  HX BREAST LUMPECTOMY      Benign. Dr. Anayeli Ivey HX ORTHOPAEDIC Right 10/2012    Right arm. BENIGN TUMOR REMOVED. Dr. Raissa Cobos:    Family History   Problem Relation Age of Onset    Other Mother         gallstones    Breast Cancer Mother 52        unilateral.   Melo Franks Mother 62        Breast Cancer    Hypertension Father     Stroke Father 70        x2 CVAs and several TIAs    Kidney Disease Father     Asthma Paternal Uncle         x 2    Stroke Paternal Aunt     Diabetes Paternal Aunt     Heart Attack Paternal Aunt         multiple    Other Paternal Aunt         gout    Diabetes Paternal Aunt     Cancer Maternal Grandmother         uterine    Colon Cancer Maternal Grandmother 79    Cancer Maternal Aunt         lung    Cancer Other         maternal cousins (breast)    Breast Cancer Other 35        x 2.  bilaterally.  Alzheimer Maternal Aunt         x 2    Other Paternal Grandmother         kyphosis       SOCIAL HISTORY:    Social History     Socioeconomic History    Marital status:      Spouse name: Not on file    Number of children: Not on file    Years of education: Not on file    Highest education level: Not on file   Tobacco Use    Smoking status: Former Smoker     Packs/day: 0.50     Years: 10.00     Pack years: 5.00     Types: Cigarettes     Last attempt to quit: 1985     Years since quittin.6    Smokeless tobacco: Never Used   Substance and Sexual Activity    Alcohol use:  Yes     Alcohol/week: 2.5 standard drinks     Types: 3 Standard drinks or equivalent per week     Comment: Occasional    Drug use: No    Sexual activity: Yes     Partners: Male     Birth control/protection: None       IMMUNIZATIONS:    Immunization History   Administered Date(s) Administered    Influenza Vaccine 10/01/2014, 10/01/2016, 10/06/2017, 10/05/2018    Influenza Vaccine (Quad) PF 10/05/2018    Influenza Vaccine Split 10/18/2010, 10/21/2011    TD Vaccine 08/31/2004    Tdap 03/28/2017         PHYSICAL EXAMINATION    Vital Signs    Visit Vitals  /74   Pulse (!) 59   Temp 97.9 °F (36.6 °C) (Oral)   Resp 18   Ht 5' 6\" (1.676 m)   Wt 107 lb 9.6 oz (48.8 kg)   SpO2 99%   BMI 17.37 kg/m²       Weight Metrics 8/26/2019 8/9/2019 3/28/2019 2/1/2019 1/9/2019 12/19/2018 12/11/2018   Weight 108 lb 9.6 oz 107 lb 9.6 oz 109 lb 6.4 oz 110 lb 108 lb 107 lb 1.6 oz 108 lb 9.6 oz   BMI 17.53 kg/m2 17.37 kg/m2 17.66 kg/m2 17.75 kg/m2 17.43 kg/m2 17.29 kg/m2 17.53 kg/m2       General appearance - Well nourished. Well appearing. Well developed. No acute distress. Underweight. Head - Normocephalic. Atraumatic. Eyes - pupils equal and reactive. Extraocular eye movements intact. Sclera anicteric. Mildly injected sclera. Ears - Hearing is grossly normal bilaterally. Nose - normal and patent. No polyps noted. No erythema. No discharge. Mouth - mucous membranes with adequate moisture. Posterior pharynx normal with cobblestone appearance. No erythema, white exudate or obstruction. Neck - supple. Midline trachea. No carotid bruits noted bilaterally. No thyromegaly noted. Chest - clear to auscultation bilaterally anteriorly and posteriorly. No wheezes. No rales or rhonchi. Breath sounds are symmetrical bilaterally. Unlabored respirations. Heart - normal rate. Regular rhythm. Normal S1, S2. No murmur noted. No rubs, clicks or gallops noted. Abdomen - soft and distended. No masses or organomegaly. No rebound, rigidity or guarding. Bowel sounds normal x 4 quadrants. No tenderness noted. Neurological - awake, alert and oriented to person, place, and time and event. Cranial nerves II through XII intact. Clear speech. Muscle strength is +5/5 x 4 extremities. Sensation is intact to light touch bilaterally. Steady gait.    Heme/Lymph - peripheral pulses normal x 4 extremities. No peripheral edema is noted. Musculoskeletal - Intact x 4 extremities. Full ROM x 4 extremities. No pain with movement. No pain on palpation of the bilateral shoulders, elbows, wrists, hands. No tenderness in the pelvis, pubic bone, bilateral hips, knees, ankles. Back exam - normal range of motion. No pain on palpation of the spinous processes in the cervical, thoracic, lumbar, sacral regions. No CVA tenderness. Skin - no rashes, erythema, ecchymosis, lacerations, abrasions, suspicious moles noted  Psychological -   normal behavior, dress and thought processes. Good insight. Good eye contact. Normal affect. Appropriate mood. Normal speech. DATA REVIEWED    Lab Results   Component Value Date/Time    WBC 5.9 08/09/2019 11:04 AM    WBC 3.8 (L) 05/29/2012 09:00 AM    HGB 13.1 08/09/2019 11:04 AM    HCT 39.6 08/09/2019 11:04 AM    PLATELET 220 42/05/4768 11:04 AM    MCV 98 (H) 08/09/2019 11:04 AM     Lab Results   Component Value Date/Time    Sodium 143 08/09/2019 11:04 AM    Potassium 4.6 08/09/2019 11:04 AM    Chloride 104 08/09/2019 11:04 AM    CO2 26 08/09/2019 11:04 AM    Anion gap 6 01/10/2018 01:14 PM    Glucose 83 08/09/2019 11:04 AM    BUN 11 08/09/2019 11:04 AM    Creatinine 0.77 08/09/2019 11:04 AM    BUN/Creatinine ratio 14 08/09/2019 11:04 AM    GFR est AA 95 08/09/2019 11:04 AM    GFR est non-AA 82 08/09/2019 11:04 AM    Calcium 9.8 08/09/2019 11:04 AM    Bilirubin, total 0.4 08/09/2019 11:04 AM    AST (SGOT) 16 08/09/2019 11:04 AM    Alk.  phosphatase 79 08/09/2019 11:04 AM    Protein, total 7.0 08/09/2019 11:04 AM    Albumin 4.7 08/09/2019 11:04 AM    Globulin 3.4 01/10/2018 01:14 PM    A-G Ratio 2.0 08/09/2019 11:04 AM    ALT (SGPT) 13 08/09/2019 11:04 AM     Lab Results   Component Value Date/Time    Cholesterol, total 157 01/25/2019 10:59 AM    HDL Cholesterol 66 01/25/2019 10:59 AM    LDL, calculated 81 01/25/2019 10:59 AM    VLDL, calculated 10 01/25/2019 10:59 AM    Triglyceride 52 01/25/2019 10:59 AM    CHOL/HDL Ratio 3.2 10/18/2010 11:28 AM     Lab Results   Component Value Date/Time    Vitamin D 25-Hydroxy 15.1 (L) 04/11/2011 09:00 AM    VITAMIN D, 25-HYDROXY 54.9 03/28/2019 10:32 AM       Lab Results   Component Value Date/Time    Hemoglobin A1c 5.1 03/21/2017 08:11 AM     Lab Results   Component Value Date/Time    TSH 2.020 01/25/2019 10:59 AM       Lab Results   Component Value Date/Time    Microalb/Creat ratio (ug/mg creat.) <4.2 01/25/2019 03:46 PM         ASSESSMENT and PLAN      ICD-10-CM ICD-9-CM    1. Essential hypertension with goal blood pressure less than 140/90 O97 329.0 METABOLIC PANEL, COMPREHENSIVE      MAGNESIUM      CBC W/O DIFF    stable   2. Right arm pain M79.601 729.5 meloxicam (MOBIC) 7.5 mg tablet      AMB POC EKG ROUTINE W/ 12 LEADS, INTER & REP      REFERRAL TO ORTHOPEDICS      REFERRAL TO CARDIOLOGY    due to arthritis vs cardio vs musculoskeletal vs other   3. Dizziness R42 780.4 MAGNESIUM      CBC W/O DIFF      VITAMIN B12 & FOLATE      REFERRAL TO CARDIOLOGY    light headed, due to eye glasses vs ?dehydration vs ? blood sugar vs cardio vs other, intermittently   4. Pain of right hand M79.641 729.5 meloxicam (MOBIC) 7.5 mg tablet      RA + CCP ABS      NAVJOT, DIRECT, W/REFLEX    due to arthritis vs other   5. Other chest pain R07.89 786.59 AMB POC EKG ROUTINE W/ 12 LEADS, INTER & REP      REFERRAL TO CARDIOLOGY    radiating from R Arm, occ solely midsternal   6. Dyslipidemia E78.5 272.4     stable   7. Paresthesia of both hands R20.2 782.0 VITAMIN B12 & FOLATE    due to Raynaud's vs other, intermittently   8. Post-concussion headache G44.309 339.20     10/2018, resolved   9. Vitamin D deficiency E55.9 268.9     stable   10. Heart palpitations R00.2 785.1     resolved without recurrence   11. Hot flashes R23.2 782.62    12. Raynaud's phenomenon without gangrene I73.00 443.0    13.  Family history of heart attack Z82.49 V17.3 REFERRAL TO CARDIOLOGY   14. Family history of Alzheimer's disease Z82.0 V17.2    15. Family history of kidney disease Z84.1 V18.69    16. Family history of breast cancer Z80.3 V16.3    17. Family history of colon cancer Z80.0 V16.0        Discussed the patient's BMI with her. The BMI follow up plan is as follows: I have counseled this patient on diet and exercise regimens. Decrease carbohydrates (white foods, sweet foods, sweet drinks and alcohol), increase green leafy vegetables and protein (lean meats and beans) with each meal.  Avoid fried foods. Eat 3-5 small meals daily. Do not skip meals. Increase water intake. Increase physical activity to 30 minutes daily for health benefit or 60 minutes daily to prevent weight regain, as tolerated. Get 7-8 hours uninterrupted sleep nightly. Chart reviewed and updated. Continue current medications and care. Prescriptions written and sent to pharmacy; medication side effects discussed. Mobic 7.5 mg  Most recent tests reviewed from 1/25/2019. Recheck pertinent labs today. Referrals given; patient urged to keep appointments with specialists. Ortho, cardio   Counseled patient on health concerns:  Arthritis, paresthesia, chest pain, headaches, light headedness, blood pressure. Relevant handouts given and discussed with patient. Immunizations noted. Offered empathy, support, legitimation, prayers, partnership to patient. Praised patient for progress. Follow-up and Dispositions    · Return in about 3 months (around 11/9/2019) for results, dizziness. Patient was offered a choice/choices in the treatment plan today. Patient expresses understanding of the plan and agrees with recommendations. More than 30 mins spent face to face with patient and more than 50% of this time spent in counseling and coordinating care. Written by shawna Hudson, as dictated by Dr. Alexandra Gustafson DO.     Documentation True and Accepted by Lamin Pacheco. Jamaal Damon. Patient Instructions        Vertigo: Exercises  Introduction  Here are some examples of exercises for you to try. The exercises may be suggested for a condition or for rehabilitation. Start each exercise slowly. Ease off the exercises if you start to have pain. You will be told when to start these exercises and which ones will work best for you. How to do the exercises  Exercise 1    1. Stand with a chair in front of you and a wall behind you. If you begin to fall, you may use them for support. 2. Stand with your feet together and your arms at your sides. 3. Move your head up and down 10 times. Exercise 2    1. Move your head side to side 10 times. Exercise 3    1. Move your head diagonally up and down 10 times. Exercise 4    1. Move your head diagonally up and down 10 times on the other side. Follow-up care is a key part of your treatment and safety. Be sure to make and go to all appointments, and call your doctor if you are having problems. It's also a good idea to know your test results and keep a list of the medicines you take. Where can you learn more? Go to http://marlo-estelita.info/. Enter F349 in the search box to learn more about \"Vertigo: Exercises. \"  Current as of: October 21, 2018  Content Version: 12.1  © 7465-2488 [employer]wise, [employer]. Care instructions adapted under license by [employer] (which disclaims liability or warranty for this information). If you have questions about a medical condition or this instruction, always ask your healthcare professional. [place] [address] any warranty or liability for your use of this information.

## 2019-08-09 NOTE — PATIENT INSTRUCTIONS
Vertigo: Exercises Introduction Here are some examples of exercises for you to try. The exercises may be suggested for a condition or for rehabilitation. Start each exercise slowly. Ease off the exercises if you start to have pain. You will be told when to start these exercises and which ones will work best for you. How to do the exercises Exercise 1 1. Stand with a chair in front of you and a wall behind you. If you begin to fall, you may use them for support. 2. Stand with your feet together and your arms at your sides. 3. Move your head up and down 10 times. Exercise 2 1. Move your head side to side 10 times. Exercise 3 1. Move your head diagonally up and down 10 times. Exercise 4 1. Move your head diagonally up and down 10 times on the other side. Follow-up care is a key part of your treatment and safety. Be sure to make and go to all appointments, and call your doctor if you are having problems. It's also a good idea to know your test results and keep a list of the medicines you take. Where can you learn more? Go to http://marlo-estelita.info/. Enter F349 in the search box to learn more about \"Vertigo: Exercises. \" Current as of: October 21, 2018 Content Version: 12.1 © 4532-5751 [employer]wise, Incorporated. Care instructions adapted under license by [employer] (which disclaims liability or warranty for this information). If you have questions about a medical condition or this instruction, always ask your healthcare professional. [place] [address] any warranty or liability for your use of this information.

## 2019-08-09 NOTE — PROGRESS NOTES
Al Howard  Identified pt with two pt identifiers(name and ). Chief Complaint   Patient presents with    Blood Pressure Check     Rm 15    Referral Follow Up         1. Have you been to the ER, urgent care clinic since your last visit? Hospitalized since your last visit? NO    2. Have you seen or consulted any other health care providers outside of the [address] [place] [address] since your last visit? Include any pap smears or colon screening. NO              Weight Metrics 2019 3/28/2019 2019 2019 2018 2018 10/17/2018   Weight 107 lb 9.6 oz 109 lb 6.4 oz 110 lb 108 lb 107 lb 1.6 oz 108 lb 9.6 oz 109 lb 12.6 oz   BMI 17.37 kg/m2 17.66 kg/m2 17.75 kg/m2 17.43 kg/m2 17.29 kg/m2 17.53 kg/m2 17.72 kg/m2         Medication reconciliation up to date and correct with patient at this time. My Chart     My chart gives you direct online access to portions of the electronic medical record (EMR) where your doctor stores your health information (ie, lab results, appointment information, medications, immunizations, and more. It is free. Would you like to set up your my chart? NO      Advance Care Planning    In the event something were to happen to you and you were unable to speak on your behalf, do you have an Advance Directive/ Living Will in place stating your wishes? NO    If yes, do we have a copy on file NO    If no, would you like information YES      ====Yon Damian Invitation====    Patient was invited to [facility] on this date and given the information folder for review. Recommended appointment with Yon Damian facilitator for ACP conversation regarding advance directives. [x] Yes  [] No  Referral sent to [facility] Choices team member or Coordinator for follow-up    [] Yes  [x] No  Patient scheduled an appointment. Site of Referral[facility]      Today's provider has been notified of reason for visit, vitals and flowsheets obtained on patients. Reviewed record in preparation for visit, huddled with provider and have obtained necessary documentation. Health Maintenance Due   Topic    BREAST CANCER SCRN MAMMOGRAM        Wt Readings from Last 3 Encounters:   08/09/19 107 lb 9.6 oz (48.8 kg)   03/28/19 109 lb 6.4 oz (49.6 kg)   02/01/19 110 lb (49.9 kg)     Temp Readings from Last 3 Encounters:   08/09/19 97.9 °F (36.6 °C) (Oral)   02/01/19 98.2 °F (36.8 °C) (Oral)   01/09/19 98.3 °F (36.8 °C) (Oral)     BP Readings from Last 3 Encounters:   08/09/19 115/74   03/28/19 102/75   02/01/19 120/72     Pulse Readings from Last 3 Encounters:   08/09/19 (!) 59   03/28/19 78   02/01/19 83     Vitals:    08/09/19 0929   BP: 115/74   Pulse: (!) 59   Resp: 18   Temp: 97.9 °F (36.6 °C)   TempSrc: Oral   SpO2: 99%   Weight: 107 lb 9.6 oz (48.8 kg)   Height: 5' 6\" (1.676 m)   PainSc:   0 - No pain         Learning Assessment:  :     Learning Assessment 12/19/2018 2/3/2014   PRIMARY LEARNER Patient Patient   HIGHEST LEVEL OF EDUCATION - PRIMARY LEARNER  - GRADUATED HIGH SCHOOL OR GED   BARRIERS PRIMARY LEARNER NONE NONE   CO-LEARNER CAREGIVER - No   CO-LEARNER NAME - n/a   PRIMARY LANGUAGE ENGLISH ENGLISH   LEARNER PREFERENCE PRIMARY DEMONSTRATION DEMONSTRATION   LEARNING SPECIAL TOPICS - no   ANSWERED BY self Patient   RELATIONSHIP SELF SELF       Depression Screening:  :     3 most recent PHQ Screens 2/1/2019   Little interest or pleasure in doing things Not at all   Feeling down, depressed, irritable, or hopeless Not at all   Total Score PHQ 2 0       Fall Risk Assessment:  :     Fall Risk Assessment, last 12 mths 2/1/2019   Able to walk? Yes   Fall in past 12 months? No       Abuse Screening:  :     Abuse Screening Questionnaire 2/1/2019   Do you ever feel afraid of your partner? N   Are you in a relationship with someone who physically or mentally threatens you? N   Is it safe for you to go home?  Y       ADL Screening:  :     ADL Assessment 2/22/2018   Feeding yourself No Help Needed   Getting from bed to chair No Help Needed   Getting dressed No Help Needed   Bathing or showering No Help Needed   Walk across the room (includes cane/walker) No Help Needed   Using the telphone No Help Needed   Taking your medications No Help Needed   Preparing meals No Help Needed   Managing money (expenses/bills) No Help Needed   Moderately strenuous housework (laundry) No Help Needed   Shopping for personal items (toiletries/medicines) No Help Needed   Shopping for groceries No Help Needed   Driving No Help Needed   Climbing a flight of stairs No Help Needed   Getting to places beyond walking distances No Help Needed

## 2019-08-10 LAB
ALBUMIN SERPL-MCNC: 4.7 G/DL (ref 3.6–4.8)
ALBUMIN/GLOB SERPL: 2 {RATIO} (ref 1.2–2.2)
ALP SERPL-CCNC: 79 IU/L (ref 39–117)
ALT SERPL-CCNC: 13 IU/L (ref 0–32)
ANA SER QL: NEGATIVE
AST SERPL-CCNC: 16 IU/L (ref 0–40)
BILIRUB SERPL-MCNC: 0.4 MG/DL (ref 0–1.2)
BUN SERPL-MCNC: 11 MG/DL (ref 8–27)
BUN/CREAT SERPL: 14 (ref 12–28)
CALCIUM SERPL-MCNC: 9.8 MG/DL (ref 8.7–10.3)
CHLORIDE SERPL-SCNC: 104 MMOL/L (ref 96–106)
CO2 SERPL-SCNC: 26 MMOL/L (ref 20–29)
CREAT SERPL-MCNC: 0.77 MG/DL (ref 0.57–1)
ERYTHROCYTE [DISTWIDTH] IN BLOOD BY AUTOMATED COUNT: 12.8 % (ref 12.3–15.4)
FOLATE SERPL-MCNC: 7.8 NG/ML
GLOBULIN SER CALC-MCNC: 2.3 G/DL (ref 1.5–4.5)
GLUCOSE SERPL-MCNC: 83 MG/DL (ref 65–99)
HCT VFR BLD AUTO: 39.6 % (ref 34–46.6)
HGB BLD-MCNC: 13.1 G/DL (ref 11.1–15.9)
MAGNESIUM SERPL-MCNC: 2.3 MG/DL (ref 1.6–2.3)
MCH RBC QN AUTO: 32.3 PG (ref 26.6–33)
MCHC RBC AUTO-ENTMCNC: 33.1 G/DL (ref 31.5–35.7)
MCV RBC AUTO: 98 FL (ref 79–97)
PLATELET # BLD AUTO: 286 X10E3/UL (ref 150–450)
POTASSIUM SERPL-SCNC: 4.6 MMOL/L (ref 3.5–5.2)
PROT SERPL-MCNC: 7 G/DL (ref 6–8.5)
RBC # BLD AUTO: 4.05 X10E6/UL (ref 3.77–5.28)
SODIUM SERPL-SCNC: 143 MMOL/L (ref 134–144)
VIT B12 SERPL-MCNC: 1494 PG/ML (ref 232–1245)
WBC # BLD AUTO: 5.9 X10E3/UL (ref 3.4–10.8)

## 2019-08-13 RX ORDER — AMITRIPTYLINE HYDROCHLORIDE 10 MG/1
TABLET, FILM COATED ORAL
Qty: 30 TAB | Refills: 3 | Status: SHIPPED | OUTPATIENT
Start: 2019-08-13 | End: 2020-01-15 | Stop reason: SDUPTHER

## 2019-08-26 ENCOUNTER — OFFICE VISIT (OUTPATIENT)
Dept: CARDIOLOGY CLINIC | Age: 64
End: 2019-08-26

## 2019-08-26 VITALS
WEIGHT: 108.6 LBS | SYSTOLIC BLOOD PRESSURE: 115 MMHG | BODY MASS INDEX: 17.45 KG/M2 | HEART RATE: 80 BPM | DIASTOLIC BLOOD PRESSURE: 73 MMHG | HEIGHT: 66 IN | RESPIRATION RATE: 16 BRPM | OXYGEN SATURATION: 100 %

## 2019-08-26 DIAGNOSIS — R94.31 ABNORMAL EKG: ICD-10-CM

## 2019-08-26 DIAGNOSIS — R00.2 HEART PALPITATIONS: ICD-10-CM

## 2019-08-26 DIAGNOSIS — Z87.891 HISTORY OF TOBACCO USE: ICD-10-CM

## 2019-08-26 DIAGNOSIS — M79.603 UPPER EXTREMITY PAIN, INFERIOR, UNSPECIFIED LATERALITY: Primary | ICD-10-CM

## 2019-08-26 NOTE — PROGRESS NOTES
[place]: [place]  [phone]    HPI: Ms. Sheri Ramirez is a 60 yo F with hyperlipidemia seen in 2009 for lightheadedness; 48 hr holter noted no arrhythmia with lightheaded spells, echo 5/2009 was normal, seen in the past for palpitations. 2/2018 loop monitor was normal.    At her last visit due to palpitations, had her wear an event monitor that was normal.  She is here now with new right sided arm pain that will feel like an aching sensation that has been on and off for the last three to four months. She does have occasions where she will get lightheaded and dizzy. Breathing overall has been okay. She does admit to not exercising much. She has palpitations. She did have a concussion taking out groceries and hit her head at the top of the open door. She is compensated on exam with clear lungs and no lower extremity edema. Soc Hx. , kids, Marvene Goo one. Assessment and Plan:   1. Arm pain. She also has not been exercising regular and we will obtain a treadmill stress test for further evaluation. If this is unrevealing, consider musculoskeletal or GI etiology. Recommend regular exercise. 2. Palpitations. These are rare and event monitor was normal.    3. Her EKG I reviewed personally from 08/09/2019 was normal sinus rhythm. There were Q-waves in V1 and V2. Otherwise, nonspecific ST-T wave abnormality.       She  has a past medical history of Allergic rhinitis, cause unspecified, Arthritis, Breast mass (06/1999), Chickenpox (childhood), Dizziness (10/2011), EBV positive mononucleosis syndrome (10/2010), Exposure to hepatitis B, Genital HSV, Head injury, closed, with concussion (10/2018), Heart palpitations (05/21/09, 01/2018), Hypertension, Hypoglycemia, Menopausal and postmenopausal disorder (1999), MRSA infection (12/2011), Osteopenia, Other and unspecified hyperlipidemia, Raynaud phenomenon, RLS (restless legs syndrome) (2008), Shingles (teenager), and Vitamin D deficiency (2008). All other systems negative except as above. PE  Vitals:    08/26/19 1011   BP: 115/73   Pulse: 80   Resp: 16   SpO2: 100%   Weight: 108 lb 9.6 oz (49.3 kg)   Height: 5' 6\" (1.676 m)    Body mass index is 17.53 kg/m². General appearance - alert, well appearing, and in no distress  Mental status - affect appropriate to mood  Neck - supple, no JVD  Chest - clear to auscultation, no wheezes, rales or rhonchi  Heart - normal rate, regular rhythm, normal S1, S2, no murmurs, rubs, clicks or gallops  Abdomen - soft, nontender, nondistended  Extremities - peripheral pulses normal, no pedal edema  Recent Labs:  Lab Results   Component Value Date/Time    Cholesterol, total 157 01/25/2019 10:59 AM    HDL Cholesterol 66 01/25/2019 10:59 AM    LDL, calculated 81 01/25/2019 10:59 AM    Triglyceride 52 01/25/2019 10:59 AM    CHOL/HDL Ratio 3.2 10/18/2010 11:28 AM     Lab Results   Component Value Date/Time    Creatinine 0.77 08/09/2019 11:04 AM     Lab Results   Component Value Date/Time    BUN 11 08/09/2019 11:04 AM     Lab Results   Component Value Date/Time    Potassium 4.6 08/09/2019 11:04 AM     Lab Results   Component Value Date/Time    Hemoglobin A1c 5.1 03/21/2017 08:11 AM     Lab Results   Component Value Date/Time    HGB 13.1 08/09/2019 11:04 AM     Lab Results   Component Value Date/Time    PLATELET 907 09/73/9851 11:04 AM       Reviewed:  Past Medical History:   Diagnosis Date    Allergic rhinitis, cause unspecified     Arthritis     fingers and toes    Breast mass 06/1999    Right. Benign. due to Prempro. Dr. Zoe Ahumada    Chickenpox childhood    Dizziness 10/2011    Dr. Rustam Jonas.  EBV positive mononucleosis syndrome 10/2010    positive titer.  Exposure to hepatitis B         Genital HSV     Head injury, closed, with concussion 10/2018    due to hitting head on trunk of vehicle. Dr. Hanh Irwin.   Txd w PT.    Heart palpitations 05/21/09, 01/2018    Dr. Migel Moreno Hypertension     Hypoglycemia     Menopausal and postmenopausal disorder     MRSA infection 2011    Right hip. Txd Bactrim.  Osteopenia     Dr. Ethan Goodwin. Dr. Pascual Escalona.  Other and unspecified hyperlipidemia     Raynaud phenomenon     RLS (restless legs syndrome)     Shingles teenager    R arm    Vitamin D deficiency      Social History     Tobacco Use   Smoking Status Former Smoker    Packs/day: 0.50    Years: 10.00    Pack years: 5.00    Types: Cigarettes    Last attempt to quit: 1985    Years since quittin.6   Smokeless Tobacco Never Used     Social History     Substance and Sexual Activity   Alcohol Use Yes    Alcohol/week: 2.5 standard drinks    Types: 3 Standard drinks or equivalent per week    Comment: Occasional     Allergies   Allergen Reactions    Prempro [Conj Estrog-Medroxyprogest Ace] Other (comments)     Benign Right breast mass. Current Outpatient Medications   Medication Sig    amitriptyline (ELAVIL) 10 mg tablet TAKE 1 TABLET BY MOUTH EVERY DAY AT NIGHT    meloxicam (MOBIC) 7.5 mg tablet Take 2 Tabs by mouth daily. Indications: joint damage causing pain and loss of function    lisinopril (PRINIVIL, ZESTRIL) 20 mg tablet Take 1 Tab by mouth daily.  acetaminophen (TYLENOL EXTRA STRENGTH) 500 mg tablet Take  by mouth every six (6) hours as needed for Pain.  cyclobenzaprine (FLEXERIL) 5 mg tablet Take 1 Tab by mouth three (3) times daily as needed for Muscle Spasm(s).  simvastatin (ZOCOR) 40 mg tablet Take 1 Tab by mouth nightly.  butalbital-acetaminophen-caff (FIORICET) -40 mg per capsule Take 1 Cap by mouth every six (6) hours as needed for Pain or Headache for up to 12 doses.     Blood Pressure Test Kit-Wrist kit Check bp daily and as needed Dx:  Hypertension I10  Indications: hypertension    montelukast (SINGULAIR) 10 mg tablet TAKE 1 TABLET BY MOUTH EVERY DAY    valACYclovir (VALTREX) 500 mg tablet TAKE 1 TABLET BY MOUTH EVERY DAY DURING FLARES    aspirin delayed-release 81 mg tablet Take 1 Tab by mouth daily.  loratadine (CLARITIN) 10 mg tablet Take 1 Tab by mouth daily.  fluticasone (FLONASE) 50 mcg/actuation nasal spray 2 Sprays by Both Nostrils route daily.  cyanocobalamin (VITAMIN B-12) 1,000 mcg tablet Take 1,000 mcg by mouth daily.  omega-3 fatty acids-vitamin e (FISH OIL) 1,000 mg Cap Take  by mouth daily.  MULTIVITAMINS (MULTIVITAMIN PO) Take  by mouth. Takes 3 times/week     No current facility-administered medications for this visit.         Earl York MD  [facility] heart and [facility] [place]  [facility] [address] [facility] [address]  [address]

## 2019-08-26 NOTE — PATIENT INSTRUCTIONS
You will be scheduled for a Stress after your appointment today. Please wear comfortable clothing (shorts or pants with a shirt or blouse) and walking/athletic shoes.     Do not eat or drink anything, except water, for at least 2 hours prior to your test.    Do take your scheduled medications prior to your test.

## 2019-09-27 DIAGNOSIS — M79.601 RIGHT ARM PAIN: ICD-10-CM

## 2019-09-27 DIAGNOSIS — M79.641 PAIN OF RIGHT HAND: ICD-10-CM

## 2019-09-27 RX ORDER — MELOXICAM 7.5 MG/1
15 TABLET ORAL DAILY
Qty: 30 TAB | Refills: 2 | Status: SHIPPED | OUTPATIENT
Start: 2019-09-27 | End: 2020-01-15 | Stop reason: SDUPTHER

## 2019-10-07 ENCOUNTER — OFFICE VISIT (OUTPATIENT)
Dept: FAMILY MEDICINE CLINIC | Age: 64
End: 2019-10-07

## 2019-10-07 VITALS
SYSTOLIC BLOOD PRESSURE: 111 MMHG | BODY MASS INDEX: 17.49 KG/M2 | HEART RATE: 81 BPM | DIASTOLIC BLOOD PRESSURE: 73 MMHG | RESPIRATION RATE: 18 BRPM | TEMPERATURE: 97.7 F | OXYGEN SATURATION: 99 % | WEIGHT: 108.8 LBS | HEIGHT: 66 IN

## 2019-10-07 DIAGNOSIS — R42 DIZZINESS: ICD-10-CM

## 2019-10-07 DIAGNOSIS — I73.00 RAYNAUD'S PHENOMENON WITHOUT GANGRENE: ICD-10-CM

## 2019-10-07 DIAGNOSIS — R00.2 HEART PALPITATIONS: ICD-10-CM

## 2019-10-07 DIAGNOSIS — Z80.3 FAMILY HISTORY OF BREAST CANCER: ICD-10-CM

## 2019-10-07 DIAGNOSIS — M79.601 RIGHT ARM PAIN: ICD-10-CM

## 2019-10-07 DIAGNOSIS — E55.9 VITAMIN D DEFICIENCY: ICD-10-CM

## 2019-10-07 DIAGNOSIS — Z80.0 FAMILY HISTORY OF COLON CANCER: ICD-10-CM

## 2019-10-07 DIAGNOSIS — Z82.3 FAMILY HISTORY OF STROKE: ICD-10-CM

## 2019-10-07 DIAGNOSIS — Z82.0 FAMILY HISTORY OF ALZHEIMER'S DISEASE: ICD-10-CM

## 2019-10-07 DIAGNOSIS — Z00.00 WELL WOMAN EXAM (NO GYNECOLOGICAL EXAM): Primary | ICD-10-CM

## 2019-10-07 DIAGNOSIS — Z82.49 FAMILY HISTORY OF HEART ATTACK: ICD-10-CM

## 2019-10-07 DIAGNOSIS — I10 ESSENTIAL HYPERTENSION WITH GOAL BLOOD PRESSURE LESS THAN 140/90: ICD-10-CM

## 2019-10-07 DIAGNOSIS — E78.5 DYSLIPIDEMIA: ICD-10-CM

## 2019-10-07 DIAGNOSIS — Z84.1 FAMILY HISTORY OF KIDNEY DISEASE: ICD-10-CM

## 2019-10-07 NOTE — PROGRESS NOTES
HISTORY OF PRESENT ILLNESS  Ca Tsang is a 61 y.o. female here for an annual physical exam    Agree with nurse note. Pt with hypertension, dizziness, dyslipidemia, vit d deficiency, Raynaud's, and family hx of Alzheimer's, stroke, heart attack, kidney disease, colon ca, and breast ca presents to the office with a BP of 111/73. She takes lisinopril 20 mg daily for BP; tolerating well. She takes Zocor 40 mg daily for cholesterol; tolerating well. Pt requests to review labs from 8/9/2019. Vit b12 1,494, CBC wnl except MCV 98, NAVJOT neg, mag 2.3, Cr 0.77, LFTs wnl. Pt's most recent colonoscopy was on 9/19/20016 with Dr. Kristie Schultz. F/U 10 years. Pt's most recent pap smear was on 2/19/2019 with Dr. Lazara Stone. She had R breast lumpectomy in 1999 due to breast ca. Pt's most recent mammogram was on 12/1/2017 with [facility]. Negative for any signs of ca. Pt is planning to schedule an eye exam with Dr. Kalli King. No eye concerns today. DEXA Scan on 8/21/2018 showed osteopenia. Her results were unchanged from 12/2014. She is followed by Dr. John Grove. Her last appointment was on 3/28/2019. Pt with underweight by BMI. Her weight has been stable over the years. She feels well and looks like her other family members. Pt saw cardio Dr. Segundo May on 8/26/2019 for aching R arm pain and heart palpitations. He ordered a treadmill stress test and noted palpitations are rare and event monitor was normal. Pt notes that Mobic 7.5 mg BID rx'd by me on 9/27/2019 improved the arm pain. New Concerns    None. Written by shawna Contreras, as dictated by Dr. Jane Carroll DO.    ROS    Review of Systems is negative except as mentioned above in HPI. ALLERGIES:    Allergies   Allergen Reactions    Prempro [Conj Estrog-Medroxyprogest Ace] Other (comments)     Benign Right breast mass.         CURRENT MEDICATIONS:    Outpatient Medications Marked as Taking for the 10/7/19 encounter (Office Visit) with Ya Lance, DO   Medication Sig Dispense Refill    meloxicam (MOBIC) 7.5 mg tablet TAKE 2 TABS BY MOUTH DAILY. INDICATIONS: JOINT DAMAGE CAUSING PAIN AND LOSS OF FUNCTION 30 Tab 2    amitriptyline (ELAVIL) 10 mg tablet TAKE 1 TABLET BY MOUTH EVERY DAY AT NIGHT 30 Tab 3    lisinopril (PRINIVIL, ZESTRIL) 20 mg tablet Take 1 Tab by mouth daily. 90 Tab 3    simvastatin (ZOCOR) 40 mg tablet Take 1 Tab by mouth nightly. 90 Tab 3    aspirin delayed-release 81 mg tablet Take 1 Tab by mouth daily. 90 Tab 3    cyanocobalamin (VITAMIN B-12) 1,000 mcg tablet Take 1,000 mcg by mouth daily.  omega-3 fatty acids-vitamin e (FISH OIL) 1,000 mg Cap Take  by mouth daily.  MULTIVITAMINS (MULTIVITAMIN PO) Take  by mouth. Takes 3 times/week         PAST MEDICAL HISTORY:    Past Medical History:   Diagnosis Date    Allergic rhinitis, cause unspecified     Arthritis     fingers and toes    Breast mass 06/1999    Right. Benign. due to Prempro. Dr. Matthew Arellano    Chickenpox childhood    Dizziness 10/2011    Dr. Claudeen Sprinkle.  EBV positive mononucleosis syndrome 10/2010    positive titer.  Exposure to hepatitis B         Genital HSV     Head injury, closed, with concussion 10/2018    due to hitting head on trunk of vehicle. Dr. Enzo Awad. Txd w PT.    Heart palpitations 05/21/09, 01/2018    Dr. Nan Squires    Hypertension     Hypoglycemia     Menopausal and postmenopausal disorder 1999    MRSA infection 12/2011    Right hip. Txd Bactrim.  Osteopenia     Dr. Kemar Bacon. Dr. Jag Dawson. Dr. Winnifred Aase 03/2019    Other and unspecified hyperlipidemia     Raynaud phenomenon     RLS (restless legs syndrome) 2008    Shingles teenager    R arm    Vitamin D deficiency 2008       PAST SURGICAL HISTORY:    Past Surgical History:   Procedure Laterality Date    COLONOSCOPY  09/19/2016    Dr. Flower Cartagena. due q 10 yrs.     HX BREAST LUMPECTOMY Right     Benign. Dr. Sharan Mata HX ORTHOPAEDIC Right 10/2012    Right arm. BENIGN TUMOR REMOVED. Dr. Francisca Simmons HISTORY:    Family History   Problem Relation Age of Onset    Other Mother         gallstones    Breast Cancer Mother 62        unilateral.    Hypertension Father     Stroke Father 70        x2 CVAs and several TIAs    Kidney Disease Father     Asthma Paternal Uncle         x 2    Stroke Paternal Aunt     Diabetes Paternal Aunt     Heart Attack Paternal Aunt         multiple    Other Paternal Aunt         gout    Diabetes Paternal Aunt     Colon Cancer Maternal Grandmother 79    Uterine Cancer Maternal Grandmother         uterine    Cancer Maternal Aunt         lung    Cancer Other         maternal cousins (breast)    Breast Cancer Other 35        x 2.  bilaterally.  Alzheimer Maternal Aunt         x 2    Other Paternal Grandmother         kyphosis due to dementia posturing    Dementia Paternal Grandmother     Cancer Maternal Grandfather         ?  Other Paternal Grandfather         MVA    Heart Attack Maternal Uncle 76       SOCIAL HISTORY:    Social History     Socioeconomic History    Marital status:      Spouse name: Not on file    Number of children: Not on file    Years of education: Not on file    Highest education level: Not on file   Tobacco Use    Smoking status: Former Smoker     Packs/day: 0.50     Years: 10.00     Pack years: 5.00     Types: Cigarettes     Last attempt to quit: 1985     Years since quittin.7    Smokeless tobacco: Never Used   Substance and Sexual Activity    Alcohol use:  Yes     Alcohol/week: 2.0 standard drinks     Types: 1 Glasses of wine, 1 Cans of beer per week     Frequency: Monthly or less     Drinks per session: 1 or 2     Binge frequency: Never     Comment: Occasional    Drug use: No    Sexual activity: Yes     Partners: Male     Birth control/protection: None     Comment: post MP   Lifestyle    Physical activity:     Days per week: 5 days     Minutes per session: 30 min    Stress: Not at all       IMMUNIZATIONS:    Immunization History   Administered Date(s) Administered    Influenza Vaccine 10/01/2014, 10/01/2016, 10/06/2017, 10/05/2018, 10/02/2019    Influenza Vaccine (Quad) PF 10/05/2018    Influenza Vaccine Split 10/18/2010, 10/21/2011    TD Vaccine 08/31/2004    Tdap 03/28/2017         PHYSICAL EXAMINATION    Vital signs     Visit Vitals  /73   Pulse 81   Temp 97.7 °F (36.5 °C) (Oral)   Resp 18   Ht 5' 6\" (1.676 m)   Wt 108 lb 12.8 oz (49.4 kg)   SpO2 99%   BMI 17.56 kg/m²        General appearance - Well nourished. Well appearing. Well developed. No acute distress. Underweight. Head - Normocephalic. Atraumatic. Non tender sinuses x 4. Eyes - pupils equal and reactive, extraocular eye movements intact, sclera anicteric. Mildly injected sclera. Ears - Hearing is grossly normal bilaterally. Bilateral TM's intact. External ear canals normal without evidence of blood or swelling. Nose - normal and patent. No erythema or turbinate edema. No discharge. No polyps. Mouth - mucous membranes with adequate moisture. Posterior pharynx normal without lesions, white exudate, or obstruction. Neck - supple. Midline trachea. No carotid bruits are noted. No thyromegaly noted. Neck is supple without rigidity. Chest - clear to auscultation bilaterally anterriorly and posteriorly. No wheezes, rales or rhonchi. Breath sounds are symmetrical bilaterally. Unlabored respirations. Heart - normal rate. Regular rhythm. Normal S1, S2. No murmurs. No rubs, clicks or gallops noted. Abdomen - soft and distended. No masses or organomegaly. No rebound, rigidity or guarding. Bowel sounds normal x 4 quadrants. No tenderness noted. Back exam - normal range of motion. No pain on palpation of the spinous processes in the cervical, thoracic, lumbar, sacral regions. No CVA tenderness. Neurological - awake, alert and oriented to person, place, and time and event. Cranial nerves II through XII intact. No focal findings. Clear speech. Muscle strength is +5/5 x 4 extremities. Sensation is intact to light touch bilaterally. Steady gait. Musculoskeletal - Intact x 4 extremities. Full ROM x 4 extremities. No pain with movement. No pain on palpation of the bilateral shoulders, elbows, wrists, hands. No tenderness in the pelvis, pubic bone, bilateral hips, knees, ankles. No obvious deformity  Heme/Lymph - peripheral pulses normal x 4 extremities. No peripheral edema is noted. No cervical adenopathy noted. Skin - no rashes, erythema, ecchymosis, lacerations, abrasions, suspicious moles  Psychological -   normal behavior, dress and thought processes. Good insight. Good eye contact. Normal affect. Appropriate mood. Normal speech. DATA REVIEWED    Lab Results   Component Value Date/Time    WBC 5.9 08/09/2019 11:04 AM    WBC 3.8 (L) 05/29/2012 09:00 AM    HGB 13.1 08/09/2019 11:04 AM    HCT 39.6 08/09/2019 11:04 AM    PLATELET 589 10/06/9755 11:04 AM    MCV 98 (H) 08/09/2019 11:04 AM     Lab Results   Component Value Date/Time    Sodium 143 08/09/2019 11:04 AM    Potassium 4.6 08/09/2019 11:04 AM    Chloride 104 08/09/2019 11:04 AM    CO2 26 08/09/2019 11:04 AM    Anion gap 6 01/10/2018 01:14 PM    Glucose 83 08/09/2019 11:04 AM    BUN 11 08/09/2019 11:04 AM    Creatinine 0.77 08/09/2019 11:04 AM    BUN/Creatinine ratio 14 08/09/2019 11:04 AM    GFR est AA 95 08/09/2019 11:04 AM    GFR est non-AA 82 08/09/2019 11:04 AM    Calcium 9.8 08/09/2019 11:04 AM    Bilirubin, total 0.4 08/09/2019 11:04 AM    AST (SGOT) 16 08/09/2019 11:04 AM    Alk.  phosphatase 79 08/09/2019 11:04 AM    Protein, total 7.0 08/09/2019 11:04 AM    Albumin 4.7 08/09/2019 11:04 AM    Globulin 3.4 01/10/2018 01:14 PM    A-G Ratio 2.0 08/09/2019 11:04 AM    ALT (SGPT) 13 08/09/2019 11:04 AM     Lab Results Component Value Date/Time    Cholesterol, total 157 01/25/2019 10:59 AM    HDL Cholesterol 66 01/25/2019 10:59 AM    LDL, calculated 81 01/25/2019 10:59 AM    VLDL, calculated 10 01/25/2019 10:59 AM    Triglyceride 52 01/25/2019 10:59 AM    CHOL/HDL Ratio 3.2 10/18/2010 11:28 AM     Lab Results   Component Value Date/Time    Vitamin D 25-Hydroxy 15.1 (L) 04/11/2011 09:00 AM    VITAMIN D, 25-HYDROXY 54.9 03/28/2019 10:32 AM       Lab Results   Component Value Date/Time    Hemoglobin A1c 5.1 03/21/2017 08:11 AM     Lab Results   Component Value Date/Time    TSH 2.020 01/25/2019 10:59 AM       Lab Results   Component Value Date/Time    Microalb/Creat ratio (ug/mg creat.) <4.2 01/25/2019 03:46 PM       ASSESSMENT and PLAN    ICD-10-CM ICD-9-CM    1. Well woman exam (no gynecological exam) Z00.00 V70.0    2. Essential hypertension with goal blood pressure less than 140/90 I10 401.9     stable without meds   3. Dyslipidemia E78.5 272.4     stable   4. Vitamin D deficiency E55.9 268.9     stable   5. Right arm pain M79.601 729.5     due to OA vs other, resolved after NSAIDs   6. Dizziness R42 780.4     resolved without recurrence   7. Heart palpitations R00.2 785.1     resolved   8. Raynaud's phenomenon without gangrene I73.00 443.0    9. Family history of Alzheimer's disease Z82.0 V17.2    10. Family history of stroke Z82.3 V17.1    11. Family history of heart attack Z82.49 V17.3    12. Family history of kidney disease Z84.1 V18.69    13. Family history of colon cancer Z80.0 V16.0    15. Family history of breast cancer Z80.3 V16.3      See ophthalmologist every 2 to 3 years unless otherwise directed. See dentist every 6 months. See dermatologist for annual check. Prescriptions written and sent to pharmacist or given to patient. Continue current medications. Chart reviewed and updated. Specialist notes reviewed and appreciated. Referrals given. Keep appointments with specialists.   Discussed recent test results and goals with patient. Recheck pertinent labs. Recent colonoscopy, PAP and mammograms reviewed. Recheck as instructed by specialists. Immunizations are noted. Advise flu vaccine between the months September to December. Advised balanced diet and exercise. Proper rest.  Increase water and fiber. Avoid tobacco.  Decrease alcohol and caffeine. Decrease carbohydrates, increase green leafy vegetables and protein. Increase water intake. Eat 3-5 small meals daily. Increase physical activity. Relevant handouts provided and discussed with pt. Counselled pt on:  Patient health concerns. Blood pressure, cholesterol, R arm pain, palpitations, dizziness, Raynaud's   Discussed the patient's BMI with her. The BMI follow up plan is as follows: I have counseled this patient on diet and exercise regimens. Follow-up and Dispositions    · Return in about 6 months (around 4/7/2020) for blood pressure. Patient was offered a choice/choices in the treatment plan today. Patient expresses understanding of the plan and agrees with recommendations. Written by shawna Lerner, as dictated by Dr. Sunni Voss DO. Documentation True and Accepted by Maxwell Zee. Dorie Julio.     Patient Instructions   Dr. Nan Wilson

## 2019-10-07 NOTE — PROGRESS NOTES
Sade García  Identified pt with two pt identifiers(name and ). Chief Complaint   Patient presents with    Physical     Rm 14         1. Have you been to the ER, urgent care clinic since your last visit? Hospitalized since your last visit? NO    2. Have you seen or consulted any other health care providers outside of the [address] [place] [address] since your last visit? Include any pap smears or colon screening. NO            Weight Metrics 10/7/2019 2019 2019 2019 3/28/2019 2019 2019   Weight 108 lb 12.8 oz 108 lb 108 lb 9.6 oz 107 lb 9.6 oz 109 lb 6.4 oz 110 lb 108 lb   BMI 17.56 kg/m2 17.43 kg/m2 17.53 kg/m2 17.37 kg/m2 17.66 kg/m2 17.75 kg/m2 17.43 kg/m2         Medication reconciliation up to date and correct with patient at this time. My Chart     My chart gives you direct online access to portions of the electronic medical record (EMR) where your doctor stores your health information (ie, lab results, appointment information, medications, immunizations, and more. It is free. Would you like to set up your my chart? NO      Advance Care Planning    In the event something were to happen to you and you were unable to speak on your behalf, do you have an Advance Directive/ Living Will in place stating your wishes? NO    If yes, do we have a copy on file NO    If no, would you like information YES      ====Yon Damian Invitation====    Patient was invited to [facility] on this date and given the information folder for review. Recommended appointment with Yon Damian facilitator for ACP conversation regarding advance directives. [x] Yes  [] No  Referral sent to [facility] team member or Coordinator for follow-up    [] Yes  [x] No  Patient scheduled an appointment. Site of Referral: [facility]      Today's provider has been notified of reason for visit, vitals and flowsheets obtained on patients.      Reviewed record in preparation for visit, huddled with provider and have obtained necessary documentation. There are no preventive care reminders to display for this patient. Wt Readings from Last 3 Encounters:   10/07/19 108 lb 12.8 oz (49.4 kg)   09/06/19 108 lb (49 kg)   08/26/19 108 lb 9.6 oz (49.3 kg)     Temp Readings from Last 3 Encounters:   10/07/19 97.7 °F (36.5 °C) (Oral)   08/09/19 97.9 °F (36.6 °C) (Oral)   02/01/19 98.2 °F (36.8 °C) (Oral)     BP Readings from Last 3 Encounters:   10/07/19 111/73   09/06/19 110/80   08/26/19 115/73     Pulse Readings from Last 3 Encounters:   10/07/19 81   08/26/19 80   08/09/19 (!) 59     Vitals:    10/07/19 1028   BP: 111/73   Pulse: 81   Resp: 18   Temp: 97.7 °F (36.5 °C)   TempSrc: Oral   SpO2: 99%   Weight: 108 lb 12.8 oz (49.4 kg)   Height: 5' 6\" (1.676 m)   PainSc:   5   PainLoc: Back         Learning Assessment:  :     Learning Assessment 12/19/2018 2/3/2014   PRIMARY LEARNER Patient Patient   HIGHEST LEVEL OF EDUCATION - PRIMARY LEARNER  - GRADUATED HIGH SCHOOL OR GED   BARRIERS PRIMARY LEARNER NONE NONE   CO-LEARNER CAREGIVER - No   CO-LEARNER NAME - n/a   PRIMARY LANGUAGE ENGLISH ENGLISH   LEARNER PREFERENCE PRIMARY DEMONSTRATION DEMONSTRATION   LEARNING SPECIAL TOPICS - no   ANSWERED BY self Patient   RELATIONSHIP SELF SELF       Depression Screening:  :     3 most recent PHQ Screens 10/7/2019   Little interest or pleasure in doing things Not at all   Feeling down, depressed, irritable, or hopeless Not at all   Total Score PHQ 2 0       Fall Risk Assessment:  :     Fall Risk Assessment, last 12 mths 2/1/2019   Able to walk? Yes   Fall in past 12 months? No       Abuse Screening:  :     Abuse Screening Questionnaire 2/1/2019   Do you ever feel afraid of your partner? N   Are you in a relationship with someone who physically or mentally threatens you? N   Is it safe for you to go home?  Y       ADL Screening:  :     ADL Assessment 2/22/2018   Feeding yourself No Help Needed   Getting from bed to chair No Help Needed   Getting dressed No Help Needed   Bathing or showering No Help Needed   Walk across the room (includes cane/walker) No Help Needed   Using the telphone No Help Needed   Taking your medications No Help Needed   Preparing meals No Help Needed   Managing money (expenses/bills) No Help Needed   Moderately strenuous housework (laundry) No Help Needed   Shopping for personal items (toiletries/medicines) No Help Needed   Shopping for groceries No Help Needed   Driving No Help Needed   Climbing a flight of stairs No Help Needed   Getting to places beyond walking distances No Help Needed

## 2019-10-22 ENCOUNTER — TELEPHONE (OUTPATIENT)
Dept: FAMILY MEDICINE CLINIC | Age: 64
End: 2019-10-22

## 2019-10-22 NOTE — TELEPHONE ENCOUNTER
----- Message from [employer] sent at 10/22/2019 12:03 PM EDT -----  Regarding: Dr. Hinkle Sheets Message/Vendor Calls    Caller's first and last name:      Reason for call:      Callback required yes/no and why:      Best contact number(s):[phone]      Details to clarify the request: Augusta Richardson didn't received info on a chair and will close claim out. Sent request about 2 weeks ago.      [employer]

## 2019-10-23 NOTE — TELEPHONE ENCOUNTER
Spoke with patient after obtaining 2 patient identifiers  Per patient she had an accommodation form from [employer] sent over and would like it faxed back. Patient says form has to say the cause of her back pain as well( per pt she needs this form for back pain). She stated it has to be back by this week. She will pick it up as well. Please advise.

## 2019-10-23 NOTE — TELEPHONE ENCOUNTER
I have not seen this Accomodation form. Please have pt re-send it but inform her that I am out of the office until Monday. She will need to request an extension on the deadline please.

## 2019-10-24 NOTE — TELEPHONE ENCOUNTER
Spoke with patient after obtaining 2 patient identifiers. Per patient she faxed over paper again last night. Writer informed her provider will be out of town until Monday and she requested patient ask for an extension.  Patient verblized understanding

## 2019-12-26 RX ORDER — LISINOPRIL 20 MG/1
TABLET ORAL
Qty: 90 TAB | Refills: 3 | Status: SHIPPED | OUTPATIENT
Start: 2019-12-26 | End: 2020-04-07

## 2020-01-15 ENCOUNTER — OFFICE VISIT (OUTPATIENT)
Dept: FAMILY MEDICINE CLINIC | Age: 65
End: 2020-01-15

## 2020-01-15 VITALS
HEART RATE: 61 BPM | OXYGEN SATURATION: 98 % | RESPIRATION RATE: 16 BRPM | TEMPERATURE: 98 F | DIASTOLIC BLOOD PRESSURE: 80 MMHG | HEIGHT: 66 IN | SYSTOLIC BLOOD PRESSURE: 129 MMHG | WEIGHT: 110.8 LBS | BODY MASS INDEX: 17.81 KG/M2

## 2020-01-15 DIAGNOSIS — J00 ACUTE RHINITIS: ICD-10-CM

## 2020-01-15 DIAGNOSIS — H53.8 BLURRY VISION, BILATERAL: ICD-10-CM

## 2020-01-15 DIAGNOSIS — E78.5 DYSLIPIDEMIA: ICD-10-CM

## 2020-01-15 DIAGNOSIS — M54.9 UPPER BACK PAIN ON RIGHT SIDE: ICD-10-CM

## 2020-01-15 DIAGNOSIS — I10 ESSENTIAL HYPERTENSION WITH GOAL BLOOD PRESSURE LESS THAN 140/90: ICD-10-CM

## 2020-01-15 DIAGNOSIS — J01.00 ACUTE NON-RECURRENT MAXILLARY SINUSITIS: ICD-10-CM

## 2020-01-15 DIAGNOSIS — M25.511 CHRONIC RIGHT SHOULDER PAIN: ICD-10-CM

## 2020-01-15 DIAGNOSIS — M85.88 OSTEOPENIA OF OTHER SITE: ICD-10-CM

## 2020-01-15 DIAGNOSIS — E55.9 VITAMIN D DEFICIENCY: ICD-10-CM

## 2020-01-15 DIAGNOSIS — G89.29 CHRONIC RIGHT SHOULDER PAIN: ICD-10-CM

## 2020-01-15 DIAGNOSIS — G44.89 OTHER HEADACHE SYNDROME: Primary | ICD-10-CM

## 2020-01-15 DIAGNOSIS — R53.82 CHRONIC FATIGUE: ICD-10-CM

## 2020-01-15 DIAGNOSIS — M54.2 NECK PAIN: ICD-10-CM

## 2020-01-15 DIAGNOSIS — H04.203 WATERY EYES: ICD-10-CM

## 2020-01-15 DIAGNOSIS — A60.00 GENITAL HERPES SIMPLEX, UNSPECIFIED SITE: ICD-10-CM

## 2020-01-15 LAB
CCP IGA+IGG SERPL IA-ACNC: 8 UNITS (ref 0–19)
FLUAV+FLUBV AG NOSE QL IA.RAPID: NEGATIVE POS/NEG
FLUAV+FLUBV AG NOSE QL IA.RAPID: NEGATIVE POS/NEG
RHEUMATOID FACT SERPL-ACNC: <10 IU/ML (ref 0–13.9)
VALID INTERNAL CONTROL?: YES

## 2020-01-15 RX ORDER — SIMVASTATIN 40 MG/1
TABLET, FILM COATED ORAL
Qty: 90 TAB | Refills: 3 | Status: SHIPPED | OUTPATIENT
Start: 2020-01-15 | End: 2021-01-29

## 2020-01-15 RX ORDER — GLUCOSAMINE SULFATE 1500 MG
POWDER IN PACKET (EA) ORAL DAILY
COMMUNITY

## 2020-01-15 RX ORDER — MELOXICAM 7.5 MG/1
15 TABLET ORAL DAILY
Qty: 90 TAB | Refills: 1 | Status: SHIPPED | OUTPATIENT
Start: 2020-01-15 | End: 2020-04-07

## 2020-01-15 RX ORDER — METHYLPREDNISOLONE 4 MG/1
TABLET ORAL
Qty: 1 DOSE PACK | Refills: 0 | Status: SHIPPED | OUTPATIENT
Start: 2020-01-15 | End: 2020-02-24 | Stop reason: ALTCHOICE

## 2020-01-15 RX ORDER — VALACYCLOVIR HYDROCHLORIDE 500 MG/1
TABLET, FILM COATED ORAL
Qty: 90 TAB | Refills: 1 | Status: SHIPPED | OUTPATIENT
Start: 2020-01-15 | End: 2021-11-05 | Stop reason: SDUPTHER

## 2020-01-15 RX ORDER — AMITRIPTYLINE HYDROCHLORIDE 10 MG/1
20 TABLET, FILM COATED ORAL
Qty: 180 TAB | Refills: 3 | Status: SHIPPED | OUTPATIENT
Start: 2020-01-15 | End: 2021-03-29 | Stop reason: ALTCHOICE

## 2020-01-15 RX ORDER — CYCLOBENZAPRINE HCL 5 MG
5 TABLET ORAL
Qty: 90 TAB | Refills: 1 | Status: SHIPPED | OUTPATIENT
Start: 2020-01-15 | End: 2021-03-29 | Stop reason: ALTCHOICE

## 2020-01-15 RX ORDER — MONTELUKAST SODIUM 10 MG/1
TABLET ORAL
Qty: 90 TAB | Refills: 3 | Status: SHIPPED | OUTPATIENT
Start: 2020-01-15 | End: 2021-01-29

## 2020-01-15 RX ORDER — AMOXICILLIN 875 MG/1
875 TABLET, FILM COATED ORAL 2 TIMES DAILY
Qty: 20 TAB | Refills: 0 | Status: SHIPPED | OUTPATIENT
Start: 2020-01-15 | End: 2020-01-25

## 2020-01-15 NOTE — PROGRESS NOTES
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 the diagonal.  3. Repeat 2 to 4 times toward each side. Dorsal glide stretch    1. Sit or stand tall and look straight ahead. 2. Slowly tuck your chin as you glide your head backward over your body  3. Hold for a count of 6, and then relax for up to 10 seconds. 4. Repeat 8 to 12 times. Chest and shoulder stretch    1. Sit or stand tall and glide your head backward as in the dorsal glide stretch. 2. Raise both arms so that your hands are next to your ears. 3. Take a deep breath, and as you breathe out, lower your elbows down and behind your back. You will feel your shoulder blades slide down and together, and at the same time you will feel a stretch across your chest and the front of your shoulders. 4. Hold for about 6 seconds, and then relax for up to 10 seconds. 5. Repeat 8 to 12 times. Strengthening: Hands on head    1.  Move your head backward, forward, and side to side against gentle pressure from your hands, holding each position for about 6 seconds. 2. Repeat 8 to 12 times. Follow-up care is a key part of your treatment and safety. Be sure to make and go to all appointments, and call your doctor if you are having problems. It's also a good idea to know your test results and keep a list of the medicines you take. Where can you learn more? Go to http://marlo-estelita.info/. Enter P975 in the search box to learn more about \"Neck: Exercises. \"  Current as of: June 26, 2019  Content Version: 12.2  © 6075-9537 [employer]. Care instructions adapted under license by [employer] (which disclaims liability or warranty for this information). If you have questions about a medical condition or this instruction, always ask your healthcare professional. [address] any warranty or liability for your use of this information. Healthy Upper Back: Exercises  Introduction  Here are some examples of exercises for your upper back. Start each exercise slowly. Ease off the exercise if you start to have pain. Your doctor or physical therapist will tell you when you can start these exercises and which ones will work best for you. How to do the exercises  Lower neck and upper back stretch    1. Stretch your arms out in front of your body. Clasp one hand on top of your other hand. 2. Gently reach out so that you feel your shoulder blades stretching away from each other. 3. Gently bend your head forward. 4. Hold for 15 to 30 seconds. 5. Repeat 2 to 4 times. Midback stretch    1. Kneel on the floor, and sit back on your ankles. 2. Lean forward, place your hands on the floor, and stretch your arms out in front of you. Rest your head between your arms. 3. Gently push your chest toward the floor, reaching as far in front of you as possible. 4. Hold for 15 to 30 seconds. 5. Repeat 2 to 4 times. Shoulder rolls    1. Sit comfortably with your feet shoulder-width apart.  You can also do this exercise while standing. 2. Roll your shoulders up, then back, and then down in a smooth, circular motion. 3. Repeat 2 to 4 times. Wall push-up    1. Stand against a wall with your feet about 12 to 24 inches back from the wall. If you feel any pain when you do this exercise, stand closer to the wall. 2. Place your hands on the wall slightly wider apart than your shoulders, and lean forward. 3. Gently lean your body toward the wall. Then push back to your starting position. Keep the motion smooth and controlled. 4. Repeat 8 to 12 times. Resisted shoulder blade squeeze    1. Sit or stand, holding the band in both hands in front of you. Keep your elbows close to your sides, bent at a 90-degree angle. Your palms should face up. 2. Squeeze your shoulder blades together, and move your arms to the outside, stretching the band. Be sure to keep your elbows at your sides while you do this. 3. Relax. 4. Repeat 8 to 12 times. Resisted rows    1. Put the band around a solid object, such as a bedpost, at about waist level. Hold one end of the band in each hand. 2. With your elbows at your sides and bent to 90 degrees, pull the band back to move your shoulder blades toward each other. Return to the starting position. 3. Repeat 8 to 12 times. Follow-up care is a key part of your treatment and safety. Be sure to make and go to all appointments, and call your doctor if you are having problems. It's also a good idea to know your test results and keep a list of the medicines you take. Where can you learn more? Go to http://marlo-estelita.info/. Enter P633 in the search box to learn more about \"Healthy Upper Back: Exercises. \"  Current as of: June 26, 2019  Content Version: 12.2  © 3061-1895 [employer]. Care instructions adapted under license by [employer] (which disclaims liability or warranty for this information).  If you have questions about a medical condition or this instruction, always ask your healthcare professional. [address] any warranty or liability for your use of this information. Sinusitis: Care Instructions  Your Care Instructions    Sinusitis is an infection of the lining of the sinus cavities in your head. Sinusitis often follows a cold. It causes pain and pressure in your head and face. In most cases, sinusitis gets better on its own in 1 to 2 weeks. But some mild symptoms may last for several weeks. Sometimes antibiotics are needed. Follow-up care is a key part of your treatment and safety. Be sure to make and go to all appointments, and call your doctor if you are having problems. It's also a good idea to know your test results and keep a list of the medicines you take. How can you care for yourself at home? · Take an over-the-counter pain medicine, such as acetaminophen (Tylenol), ibuprofen (Advil, Motrin), or naproxen (Aleve). Read and follow all instructions on the label. · If the doctor prescribed antibiotics, take them as directed. Do not stop taking them just because you feel better. You need to take the full course of antibiotics. · Be careful when taking over-the-counter cold or flu medicines and Tylenol at the same time. Many of these medicines have acetaminophen, which is Tylenol. Read the labels to make sure that you are not taking more than the recommended dose. Too much acetaminophen (Tylenol) can be harmful. · Breathe warm, moist air from a steamy shower, a hot bath, or a sink filled with hot water. Avoid cold, dry air. Using a humidifier in your home may help. Follow the directions for cleaning the machine. · Use saline (saltwater) nasal washes to help keep your nasal passages open and wash out mucus and bacteria. You can buy saline nose drops at a grocery store or drugstore.  Or you can make your own at home by adding 1 teaspoon of salt and 1 teaspoon of baking soda to 2 cups of distilled water. If you make your own, fill a bulb syringe with the solution, insert the tip into your nostril, and squeeze gently. Jose Manuel Opitz your nose. · Put a hot, wet towel or a warm gel pack on your face 3 or 4 times a day for 5 to 10 minutes each time. · Try a decongestant nasal spray like oxymetazoline (Afrin). Do not use it for more than 3 days in a row. Using it for more than 3 days can make your congestion worse. When should you call for help? Call your doctor now or seek immediate medical care if:    · You have new or worse swelling or redness in your face or around your eyes.     · You have a new or higher fever.    Watch closely for changes in your health, and be sure to contact your doctor if:    · You have new or worse facial pain.     · The mucus from your nose becomes thicker (like pus) or has new blood in it.     · You are not getting better as expected. Where can you learn more? Go to http://marlo-estelita.info/. Enter O639 in the search box to learn more about \"Sinusitis: Care Instructions. \"  Current as of: October 21, 2018  Content Version: 12.2  © 4426-2538 [employer]. Care instructions adapted under license by [employer] (which disclaims liability or warranty for this information). If you have questions about a medical condition or this instruction, always ask your healthcare professional. [place] [address] any warranty or liability for your use of this information. Advised protocol for clearing congestion:  Increase fluid intake, especially water to thin mucous and boost the immune system. Avoid sugar and dairy while congested since they thicken mucous. Get plenty of rest!  Gargle 3 times daily and as needed in Listerine or warm salt water vinegar solutions (1 tsp salt, 1 tsp vinegar in 1 cup lukewarm water.)  Use OTC nasal saline spray up each nostril twice daily. Use humidifier at bedtime.   Use OTC Mucinex 600 mg twice daily to loosen mucous. Use OTC Tylenol Arthritis or Ibuprofen up to 800 mg up to 3 times daily as needed for pain, fever or headaches. Avoid decongestants and Ibuprofen if you have high blood pressure! If mucous is consistently discolored yellow or green throughout the day for more than a week, call the doctor for an evaluation.

## 2020-01-15 NOTE — PROGRESS NOTES
Identified pt with two pt identifiers(name and ). Reviewed record in preparation for visit and have obtained necessary documentation. Chief Complaint   Patient presents with    Headache     x1 month. Pt states headache remind her of the headaches she got from a previous concussion.  Medication Refill        Health Maintenance Due   Topic    Shingrix Vaccine Age 50> (1 of 2)    BREAST CANCER SCRN MAMMOGRAM     PAP AKA CERVICAL CYTOLOGY         Visit Vitals  /80 (BP 1 Location: Left arm, BP Patient Position: Sitting)   Pulse 61   Temp 98 °F (36.7 °C) (Oral)   Resp 16   Ht 5' 6\" (1.676 m)   Wt 110 lb 12.8 oz (50.3 kg)   SpO2 98%   BMI 17.88 kg/m²     Pain Scale: 0 - No pain/10    Coordination of Care Questionnaire:  :   1. Have you been to the ER, urgent care clinic since your last visit? Hospitalized since your last visit? No    2. Have you seen or consulted any other health care providers outside of the [address] [place] [address] since your last visit? Include any pap smears or colon screening.  No

## 2020-01-15 NOTE — PATIENT INSTRUCTIONS
Increase Elavil 10 mg to 2 tabs at bedtime for headaches. If this does not offer relief, start medrol dose pack for the headaches. Insomnia: Care Instructions Your Care Instructions Insomnia is the inability to sleep well. It is a common problem for most people at some time. Insomnia may make it hard for you to get to sleep, stay asleep, or sleep as long as you need to. This can make you tired and grouchy during the day. It can also make you forgetful, less effective at work, and unhappy. Insomnia can be caused by conditions such as depression or anxiety. Pain can also affect your ability to sleep. When these problems are solved, the insomnia usually clears up. But sometimes bad sleep habits can cause insomnia. If insomnia is affecting your work or your enjoyment of life, you can take steps to improve your sleep. Follow-up care is a key part of your treatment and safety. Be sure to make and go to all appointments, and call your doctor if you are having problems. It's also a good idea to know your test results and keep a list of the medicines you take. How can you care for yourself at home? What to avoid · Do not have drinks with caffeine, such as coffee or black tea, for 8 hours before bed. · Do not smoke or use other types of tobacco near bedtime. Nicotine is a stimulant and can keep you awake. · Avoid drinking alcohol late in the evening, because it can cause you to wake in the middle of the night. · Do not eat a big meal close to bedtime. If you are hungry, eat a light snack. · Do not drink a lot of water close to bedtime, because the need to urinate may wake you up during the night. · Do not read or watch TV in bed. Use the bed only for sleeping and sexual activity. What to try · Go to bed at the same time every night, and wake up at the same time every morning. Do not take naps during the day. · Keep your bedroom quiet, dark, and cool. · Sleep on a comfortable pillow and mattress. · If watching the clock makes you anxious, turn it facing away from you so you cannot see the time. · If you worry when you lie down, start a worry book. Well before bedtime, write down your worries, and then set the book and your concerns aside. · Try meditation or other relaxation techniques before you go to bed. · If you cannot fall asleep, get up and go to another room until you feel sleepy. Do something relaxing. Repeat your bedtime routine before you go to bed again. · Make your house quiet and calm about an hour before bedtime. Turn down the lights, turn off the TV, log off the computer, and turn down the volume on music. This can help you relax after a busy day. When should you call for help? Watch closely for changes in your health, and be sure to contact your doctor if: 
  · Your efforts to improve your sleep do not work.  
  · Your insomnia gets worse.  
  · You have been feeling down, depressed, or hopeless or have lost interest in things that you usually enjoy. Where can you learn more? Go to http://marlo-estelita.info/. Enter P513 in the search box to learn more about \"Insomnia: Care Instructions. \" Current as of: April 7, 2019 Content Version: 12.2 © 6619-9485 Healthwise, Incorporated. Care instructions adapted under license by [employer] (which disclaims liability or warranty for this information). If you have questions about a medical condition or this instruction, always ask your healthcare professional. [place] [address] any warranty or liability for your use of this information. SLEEP HYGIENE 1. Try to maintain a regular schedule for bedtime, rise time, meals, exercise, chores, etc.  The body's internal clock or Circadian Rhythm works best when set schedules are kept. If you stay up later on weekends, try not to sleep more than an hour past your usual wake time. ALLOW AT LEAST 7 HOURS UNINTERRUPTED SLEEP. 2.  Try an OTC sleep agent (Unisom, Benadryl, Melatonin 0.5-1 mg, etc.) nightly for 2 weeks, if okay with your doctor. Take 1 pill 30 minutes before your chosen bedtime. This will help to reset your sleep/wake cycle. Absolutely no driving once this medicine is taken. 3.  AVOID stimulants such as caffeine (coffee, tea, cocoa, chocolate, nerissa) and medications like No Doz or ADHD medication after lunchtime or 4-8 hours before bedtime. Heavy caffeine use early in the day can also disrupt sleep. 4.  AVOID alcoholic beverages 6 hours prior to bedtime. It can cause frequent awakenings in the night as your body gets rid of the alcohol. 5.  AVOID heavy meals before bedtime. Try to eat dinner 2-3 hours before bedtime. A light snack which includes dairy products is a good idea since these chemicals (tryptophan) promote drowsiness. Do not do this if you are intolerant to dairy products. 6.  AVOID exercising before bedtime. Exercise should be performed preferably in light during the day. 7.  AVOID napping during the day, especially after 3 pm.  If a nap is needed, limit daytime naps to 1 hour. 8.  AVOID nicotine prior to bed. Withdrawal from nicotine can cause temporary sleep disruptions. Once withdrawal is complete, the ex-smoker will probably find that sleep comes faster. 9.  AVOID laying in bed awake for long periods of time. Jeremy up, do a simple activity, then return to the bedroom when you are drowsy. 10.  AVOID clock watching. Remove easily visible clocks from the room or place them where they are not easily visible. Clock watching adds stress to this situation. 11.  AVOID bright lights and loud music in the bedroom. Make the room dark and quiet. Sleep is the only thing you should do in your bed. Light signals your brain to be alert. 12.  AVOID warm rooms when trying to sleep. Keep the bedroom well-ventilated and on the cool side.   Feeling too warm can cause night time waking. 13. AVOID drinking large amounts of water at bedtime. Decrease beverage consumption  2-3 hours before bedtime. Use the bathroom to empty your bladder before getting into bed. 14.  Take an over the counter sleep agent such as Tylenol PM or Advil PM if pain is keeping you awake at night, if okay with your doctor. Advised protocol for clearing congestion:  Increase fluid intake, especially water to thin mucous and boost the immune system. Avoid sugar and dairy while congested since they thicken mucous. Get plenty of rest!  Gargle 3 times daily and as needed in Listerine or warm salt water vinegar solutions (1 tsp salt, 1 tsp vinegar in 1 cup lukewarm water.)  Use OTC nasal saline spray up each nostril twice daily. Use humidifier at bedtime. Use OTC Mucinex 600 mg twice daily to loosen mucous. Use OTC Tylenol Arthritis or Ibuprofen up to 800 mg up to 3 times daily as needed for pain, fever or headaches. Avoid decongestants and Ibuprofen if you have high blood pressure! If mucous is consistently discolored yellow or green throughout the day for more than a week, call the doctor for an evaluation. Neck: Exercises Introduction Here are some examples of exercises for you to try. The exercises may be suggested for a condition or for rehabilitation. Start each exercise slowly. Ease off the exercises if you start to have pain. You will be told when to start these exercises and which ones will work best for you. How to do the exercises Neck stretch 1. This stretch works best if you keep your shoulder down as you lean away from it. To help you remember to do this, start by relaxing your shoulders and lightly holding on to your thighs or your chair. 2. Tilt your head toward your shoulder and hold for 15 to 30 seconds. Let the weight of your head stretch your muscles.  
3. If you would like a little added stretch, use your hand to gently and steadily pull your head toward your shoulder. For example, keeping your right shoulder down, lean your head to the left. 4. Repeat 2 to 4 times toward each shoulder. Diagonal neck stretch 1. Turn your head slightly toward the direction you will be stretching, and tilt your head diagonally toward your chest and hold for 15 to 30 seconds. 2. If you would like a little added stretch, use your hand to gently and steadily pull your head forward on the diagonal. 
3. Repeat 2 to 4 times toward each side. Dorsal glide stretch 1. Sit or stand tall and look straight ahead. 2. Slowly tuck your chin as you glide your head backward over your body 3. Hold for a count of 6, and then relax for up to 10 seconds. 4. Repeat 8 to 12 times. Chest and shoulder stretch 1. Sit or stand tall and glide your head backward as in the dorsal glide stretch. 2. Raise both arms so that your hands are next to your ears. 3. Take a deep breath, and as you breathe out, lower your elbows down and behind your back. You will feel your shoulder blades slide down and together, and at the same time you will feel a stretch across your chest and the front of your shoulders. 4. Hold for about 6 seconds, and then relax for up to 10 seconds. 5. Repeat 8 to 12 times. Strengthening: Hands on head 1. Move your head backward, forward, and side to side against gentle pressure from your hands, holding each position for about 6 seconds. 2. Repeat 8 to 12 times. Follow-up care is a key part of your treatment and safety. Be sure to make and go to all appointments, and call your doctor if you are having problems. It's also a good idea to know your test results and keep a list of the medicines you take. Where can you learn more? Go to http://marlo-estelita.info/. Enter P975 in the search box to learn more about \"Neck: Exercises. \" Current as of: June 26, 2019 Content Version: 12.2 © 4023-9437 Healthwise, Incorporated. Care instructions adapted under license by [employer] (which disclaims liability or warranty for this information). If you have questions about a medical condition or this instruction, always ask your healthcare professional. [address] any warranty or liability for your use of this information. Healthy Upper Back: Exercises Introduction Here are some examples of exercises for your upper back. Start each exercise slowly. Ease off the exercise if you start to have pain. Your doctor or physical therapist will tell you when you can start these exercises and which ones will work best for you. How to do the exercises Lower neck and upper back stretch 1. Stretch your arms out in front of your body. Clasp one hand on top of your other hand. 2. Gently reach out so that you feel your shoulder blades stretching away from each other. 3. Gently bend your head forward. 4. Hold for 15 to 30 seconds. 5. Repeat 2 to 4 times. Midback stretch 1. Kneel on the floor, and sit back on your ankles. 2. Lean forward, place your hands on the floor, and stretch your arms out in front of you. Rest your head between your arms. 3. Gently push your chest toward the floor, reaching as far in front of you as possible. 4. Hold for 15 to 30 seconds. 5. Repeat 2 to 4 times. Shoulder rolls 1. Sit comfortably with your feet shoulder-width apart. You can also do this exercise while standing. 2. Roll your shoulders up, then back, and then down in a smooth, circular motion. 3. Repeat 2 to 4 times. Wall push-up 1. Stand against a wall with your feet about 12 to 24 inches back from the wall. If you feel any pain when you do this exercise, stand closer to the wall. 2. Place your hands on the wall slightly wider apart than your shoulders, and lean forward. 3. Gently lean your body toward the wall.  Then push back to your starting position. Keep the motion smooth and controlled. 4. Repeat 8 to 12 times. Resisted shoulder blade squeeze 1. Sit or stand, holding the band in both hands in front of you. Keep your elbows close to your sides, bent at a 90-degree angle. Your palms should face up. 2. Squeeze your shoulder blades together, and move your arms to the outside, stretching the band. Be sure to keep your elbows at your sides while you do this. 3. Relax. 4. Repeat 8 to 12 times. Resisted rows 1. Put the band around a solid object, such as a bedpost, at about waist level. Hold one end of the band in each hand. 2. With your elbows at your sides and bent to 90 degrees, pull the band back to move your shoulder blades toward each other. Return to the starting position. 3. Repeat 8 to 12 times. Follow-up care is a key part of your treatment and safety. Be sure to make and go to all appointments, and call your doctor if you are having problems. It's also a good idea to know your test results and keep a list of the medicines you take. Where can you learn more? Go to http://marlo-estelita.info/. Enter X794 in the search box to learn more about \"Healthy Upper Back: Exercises. \" Current as of: June 26, 2019 Content Version: 12.2 © 3193-5048 [employer]. Care instructions adapted under license by [employer] (which disclaims liability or warranty for this information). If you have questions about a medical condition or this instruction, always ask your healthcare professional. [place] [address] any warranty or liability for your use of this information. Sinusitis: Care Instructions Your Care Instructions Sinusitis is an infection of the lining of the sinus cavities in your head. Sinusitis often follows a cold. It causes pain and pressure in your head and face. In most cases, sinusitis gets better on its own in 1 to 2 weeks.  But some mild symptoms may last for several weeks. Sometimes antibiotics are needed. Follow-up care is a key part of your treatment and safety. Be sure to make and go to all appointments, and call your doctor if you are having problems. It's also a good idea to know your test results and keep a list of the medicines you take. How can you care for yourself at home? · Take an over-the-counter pain medicine, such as acetaminophen (Tylenol), ibuprofen (Advil, Motrin), or naproxen (Aleve). Read and follow all instructions on the label. · If the doctor prescribed antibiotics, take them as directed. Do not stop taking them just because you feel better. You need to take the full course of antibiotics. · Be careful when taking over-the-counter cold or flu medicines and Tylenol at the same time. Many of these medicines have acetaminophen, which is Tylenol. Read the labels to make sure that you are not taking more than the recommended dose. Too much acetaminophen (Tylenol) can be harmful. · Breathe warm, moist air from a steamy shower, a hot bath, or a sink filled with hot water. Avoid cold, dry air. Using a humidifier in your home may help. Follow the directions for cleaning the machine. · Use saline (saltwater) nasal washes to help keep your nasal passages open and wash out mucus and bacteria. You can buy saline nose drops at a grocery store or drugstore. Or you can make your own at home by adding 1 teaspoon of salt and 1 teaspoon of baking soda to 2 cups of distilled water. If you make your own, fill a bulb syringe with the solution, insert the tip into your nostril, and squeeze gently. [place] your nose. · Put a hot, wet towel or a warm gel pack on your face 3 or 4 times a day for 5 to 10 minutes each time. · Try a decongestant nasal spray like oxymetazoline (Afrin). Do not use it for more than 3 days in a row. Using it for more than 3 days can make your congestion worse. When should you call for help? Call your doctor now or seek immediate medical care if: 
  · You have new or worse swelling or redness in your face or around your eyes.  
  · You have a new or higher fever.  
 Watch closely for changes in your health, and be sure to contact your doctor if: 
  · You have new or worse facial pain.  
  · The mucus from your nose becomes thicker (like pus) or has new blood in it.  
  · You are not getting better as expected. Where can you learn more? Go to http://marlo-estelita.info/. Enter D614 in the search box to learn more about \"Sinusitis: Care Instructions. \" Current as of: October 21, 2018 Content Version: 12.2 © 9966-8153 [employer]. Care instructions adapted under license by [employer] (which disclaims liability or warranty for this information). If you have questions about a medical condition or this instruction, always ask your healthcare professional. [address] any warranty or liability for your use of this information. Advised protocol for clearing congestion:  Increase fluid intake, especially water to thin mucous and boost the immune system. Avoid sugar and dairy while congested since they thicken mucous. Get plenty of rest!  Gargle 3 times daily and as needed in Listerine or warm salt water vinegar solutions (1 tsp salt, 1 tsp vinegar in 1 cup lukewarm water.)  Use OTC nasal saline spray up each nostril twice daily. Use humidifier at bedtime. Use OTC Mucinex 600 mg twice daily to loosen mucous. Use OTC Tylenol Arthritis or Ibuprofen up to 800 mg up to 3 times daily as needed for pain, fever or headaches. Avoid decongestants and Ibuprofen if you have high blood pressure! If mucous is consistently discolored yellow or green throughout the day for more than a week, call the doctor for an evaluation.

## 2020-02-17 ENCOUNTER — OFFICE VISIT (OUTPATIENT)
Dept: URGENT CARE | Age: 65
End: 2020-02-17

## 2020-02-17 VITALS
RESPIRATION RATE: 17 BRPM | HEART RATE: 104 BPM | BODY MASS INDEX: 17.68 KG/M2 | TEMPERATURE: 98.9 F | DIASTOLIC BLOOD PRESSURE: 80 MMHG | SYSTOLIC BLOOD PRESSURE: 146 MMHG | WEIGHT: 110 LBS | OXYGEN SATURATION: 99 % | HEIGHT: 66 IN

## 2020-02-17 DIAGNOSIS — H11.32 SUBCONJUNCTIVAL HEMORRHAGE OF LEFT EYE: ICD-10-CM

## 2020-02-17 DIAGNOSIS — R68.83 CHILLS: ICD-10-CM

## 2020-02-17 DIAGNOSIS — R68.89 FLU-LIKE SYMPTOMS: Primary | ICD-10-CM

## 2020-02-17 LAB
FLUAV+FLUBV AG NOSE QL IA.RAPID: NEGATIVE POS/NEG
FLUAV+FLUBV AG NOSE QL IA.RAPID: NEGATIVE POS/NEG
VALID INTERNAL CONTROL?: YES
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
## 2020-02-17 NOTE — PATIENT INSTRUCTIONS
Upper Respiratory Infection (Cold): Care Instructions  Your Care Instructions    An upper respiratory infection, or URI, is an infection of the nose, sinuses, or throat. URIs are spread by coughs, sneezes, and direct contact. The common cold is the most frequent kind of URI. The flu and sinus infections are other kinds of URIs. Almost all URIs are caused by viruses. Antibiotics won't cure them. But you can treat most infections with home care. This may include drinking lots of fluids and taking over-the-counter pain medicine. You will probably feel better in 4 to 10 days. The doctor has checked you carefully, but problems can develop later. If you notice any problems or new symptoms, get medical treatment right away. Follow-up care is a key part of your treatment and safety. Be sure to make and go to all appointments, and call your doctor if you are having problems. It's also a good idea to know your test results and keep a list of the medicines you take. How can you care for yourself at home? · To prevent dehydration, drink plenty of fluids, enough so that your urine is light yellow or clear like water. Choose water and other caffeine-free clear liquids until you feel better. If you have kidney, heart, or liver disease and have to limit fluids, talk with your doctor before you increase the amount of fluids you drink. · Take an over-the-counter pain medicine, such as acetaminophen (Tylenol), ibuprofen (Advil, Motrin), or naproxen (Aleve). Read and follow all instructions on the label. · Before you use cough and cold medicines, check the label. These medicines may not be safe for young children or for people with certain health problems. · Be careful when taking over-the-counter cold or flu medicines and Tylenol at the same time. Many of these medicines have acetaminophen, which is Tylenol. Read the labels to make sure that you are not taking more than the recommended dose.  Too much acetaminophen (Tylenol) can be harmful. · Get plenty of rest.  · Do not smoke or allow others to smoke around you. If you need help quitting, talk to your doctor about stop-smoking programs and medicines. These can increase your chances of quitting for good. When should you call for help? Call 911 anytime you think you may need emergency care. For example, call if:    · You have severe trouble breathing.    Call your doctor now or seek immediate medical care if:    · You seem to be getting much sicker.     · You have new or worse trouble breathing.     · You have a new or higher fever.     · You have a new rash.    Watch closely for changes in your health, and be sure to contact your doctor if:    · You have a new symptom, such as a sore throat, an earache, or sinus pain.     · You cough more deeply or more often, especially if you notice more mucus or a change in the color of your mucus.     · You do not get better as expected. Where can you learn more? Go to http://marlo-estelita.info/. Enter S057 in the search box to learn more about \"Upper Respiratory Infection (Cold): Care Instructions. \"  Current as of: June 9, 2019  Content Version: 12.2  © 1304-1078 [employer]. Care instructions adapted under license by [employer] (which disclaims liability or warranty for this information). If you have questions about a medical condition or this instruction, always ask your healthcare professional. [place] [address] any warranty or liability for your use of this information. Subconjunctival Hemorrhage: Care Instructions  Your Care Instructions    Sometimes small blood vessels in the white of the eye can break, causing a red spot or speck. This is called a subconjunctival hemorrhage. The blood vessels may break when you sneeze, cough, vomit, strain, or bend over. Sometimes there is no clear cause. The blood may look alarming, especially if the spot is large.  If there is no pain or vision change, there is usually no reason to worry, and the blood slowly will go away on its own in 2 to 3 weeks. Follow-up care is a key part of your treatment and safety. Be sure to make and go to all appointments, and call your doctor if you are having problems. It's also a good idea to know your test results and keep a list of the medicines you take. How can you care for yourself at home? · Watch for changes in your eye. It is normal for the red spot on your eyeball to change color as it heals. Just like a bruise on your skin, it may change from red to brown to purple to yellow. · Do not take aspirin or products that contain aspirin, which can increase bleeding. Use acetaminophen (Tylenol) if you need pain relief for another problem. · Do not take two or more pain medicines at the same time unless the doctor told you to. Many pain medicines have acetaminophen, which is Tylenol. Too much acetaminophen (Tylenol) can be harmful. When should you call for help? Call your doctor now or seek immediate medical care if:    · You have signs of an eye infection, such as:  ? Pus or thick discharge coming from the eye.  ? Redness or swelling around the eye.  ? A fever.     · You see blood over the black part of your eye (pupil).     · You have any changes or problems in your vision.     · You have any pain in your eye.    Watch closely for changes in your health, and be sure to contact your doctor if:    · You do not get better as expected. Where can you learn more? Go to http://marlo-estelita.info/. Enter D373 in the search box to learn more about \"Subconjunctival Hemorrhage: Care Instructions. \"  Current as of: May 5, 2019  Content Version: 12.2  © 2079-4321 [employer]. Care instructions adapted under license by [employer] (which disclaims liability or warranty for this information).  If you have questions about a medical condition or this instruction, always ask your healthcare professional. [place] [address] any warranty or liability for your use of this information.

## 2020-02-19 ENCOUNTER — OFFICE VISIT (OUTPATIENT)
Dept: FAMILY MEDICINE CLINIC | Age: 65
End: 2020-02-19

## 2020-02-19 VITALS
OXYGEN SATURATION: 97 % | HEART RATE: 95 BPM | BODY MASS INDEX: 16.81 KG/M2 | SYSTOLIC BLOOD PRESSURE: 91 MMHG | WEIGHT: 104.6 LBS | TEMPERATURE: 99.6 F | HEIGHT: 66 IN | DIASTOLIC BLOOD PRESSURE: 58 MMHG | RESPIRATION RATE: 18 BRPM

## 2020-02-19 DIAGNOSIS — J06.9 VIRAL URI WITH COUGH: Primary | ICD-10-CM

## 2020-02-19 RX ORDER — CETIRIZINE HYDROCHLORIDE, PSEUDOEPHEDRINE HYDROCHLORIDE 5; 120 MG/1; MG/1
1 TABLET, FILM COATED, EXTENDED RELEASE ORAL AS NEEDED
COMMUNITY

## 2020-02-19 RX ORDER — BENZONATATE 200 MG/1
200 CAPSULE ORAL
Qty: 21 CAP | Refills: 0 | Status: SHIPPED | OUTPATIENT
Start: 2020-02-19 | End: 2020-02-26

## 2020-02-19 NOTE — PATIENT INSTRUCTIONS
Stop the generic allergy pill and just take the Zyrtec D. Add back your usual Singulair (montelukast) once daily. Add plain Mucinex in the blue box as needed to break up congestion and make your cough more productive. Take the pill for cough (benzonatate) as needed up to every 8 hours. Upper Respiratory Infection (Cold): Care Instructions  Your Care Instructions    An upper respiratory infection, or URI, is an infection of the nose, sinuses, or throat. URIs are spread by coughs, sneezes, and direct contact. The common cold is the most frequent kind of URI. The flu and sinus infections are other kinds of URIs. Almost all URIs are caused by viruses. Antibiotics won't cure them. But you can treat most infections with home care. This may include drinking lots of fluids and taking over-the-counter pain medicine. You will probably feel better in 4 to 10 days. The doctor has checked you carefully, but problems can develop later. If you notice any problems or new symptoms, get medical treatment right away. Follow-up care is a key part of your treatment and safety. Be sure to make and go to all appointments, and call your doctor if you are having problems. It's also a good idea to know your test results and keep a list of the medicines you take. How can you care for yourself at home? · To prevent dehydration, drink plenty of fluids, enough so that your urine is light yellow or clear like water. Choose water and other caffeine-free clear liquids until you feel better. If you have kidney, heart, or liver disease and have to limit fluids, talk with your doctor before you increase the amount of fluids you drink. · Take an over-the-counter pain medicine, such as acetaminophen (Tylenol), ibuprofen (Advil, Motrin), or naproxen (Aleve). Read and follow all instructions on the label. · Before you use cough and cold medicines, check the label.  These medicines may not be safe for young children or for people with certain health problems. · Be careful when taking over-the-counter cold or flu medicines and Tylenol at the same time. Many of these medicines have acetaminophen, which is Tylenol. Read the labels to make sure that you are not taking more than the recommended dose. Too much acetaminophen (Tylenol) can be harmful. · Get plenty of rest.  · Do not smoke or allow others to smoke around you. If you need help quitting, talk to your doctor about stop-smoking programs and medicines. These can increase your chances of quitting for good. When should you call for help? Call 911 anytime you think you may need emergency care. For example, call if:    · You have severe trouble breathing.    Call your doctor now or seek immediate medical care if:    · You seem to be getting much sicker.     · You have new or worse trouble breathing.     · You have a new or higher fever.     · You have a new rash.    Watch closely for changes in your health, and be sure to contact your doctor if:    · You have a new symptom, such as a sore throat, an earache, or sinus pain.     · You cough more deeply or more often, especially if you notice more mucus or a change in the color of your mucus.     · You do not get better as expected. Where can you learn more? Go to http://marlo-estelita.info/. Enter B229 in the search box to learn more about \"Upper Respiratory Infection (Cold): Care Instructions. \"  Current as of: June 9, 2019  Content Version: 12.2  © 6996-2113 [employer]. Care instructions adapted under license by [employer] (which disclaims liability or warranty for this information). If you have questions about a medical condition or this instruction, always ask your healthcare professional. [place] [address] any warranty or liability for your use of this information.

## 2020-02-19 NOTE — PROGRESS NOTES
Thalia Campbell is a 59 y.o. female  Chief Complaint   Patient presents with    Other     Possible allergys , chills, cold. Since about saterday 2/15/20    Cough     sore throat from coughing, and left ear hurts. Health Maintenance Due   Topic Date Due    Breast Cancer Screen Mammogram  03/28/2018    Lipid Screen  01/25/2020    PAP AKA CERVICAL CYTOLOGY  03/28/2020     Visit Vitals  BP 91/58   Pulse 95   Temp 99.6 °F (37.6 °C) (Oral)   Resp 18   Ht 5' 6\" (1.676 m)   Wt 104 lb 9.6 oz (47.4 kg)   SpO2 97%   BMI 16.88 kg/m²     1. Have you been to the ER, urgent care clinic since your last visit? Hospitalized since your last visit? Yes, [facility], for cough and chills, 02/17/2020. Was told to take zyrtec     2. Have you seen or consulted any other health care providers outside of the [address] [place] [address] since your last visit? Include any pap smears or colon screening.   No
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
## 2020-02-21 ENCOUNTER — TELEPHONE (OUTPATIENT)
Dept: FAMILY MEDICINE CLINIC | Age: 65
End: 2020-02-21

## 2020-02-21 NOTE — TELEPHONE ENCOUNTER
----- Message from Pattie Bennett sent at 2/21/2020  9:31 AM EST -----  Regarding: DO Hay/telephone  Contact: [phone]  Caller's first and last name and relationship to patient (if not the patient): N/A  Best contact number: [phone]  Preferred date and time: 02/21, 02/24  Scheduled appointment date and time: Monday, February 24, 2020 12:00 PM  Reason for appointment: She is requesting an antibiotic or cough medication because her congestion and cough have worsened.    Details to clarify the request: N/A

## 2020-02-24 ENCOUNTER — OFFICE VISIT (OUTPATIENT)
Dept: FAMILY MEDICINE CLINIC | Age: 65
End: 2020-02-24

## 2020-02-24 VITALS
RESPIRATION RATE: 18 BRPM | SYSTOLIC BLOOD PRESSURE: 121 MMHG | DIASTOLIC BLOOD PRESSURE: 72 MMHG | OXYGEN SATURATION: 99 % | HEART RATE: 81 BPM | WEIGHT: 106 LBS | BODY MASS INDEX: 17.04 KG/M2 | TEMPERATURE: 97.8 F | HEIGHT: 66 IN

## 2020-02-24 DIAGNOSIS — R07.0 THROAT PAIN: Primary | ICD-10-CM

## 2020-02-24 DIAGNOSIS — J02.0 STREP PHARYNGITIS: ICD-10-CM

## 2020-02-24 LAB
S PYO AG THROAT QL: POSITIVE
VALID INTERNAL CONTROL?: YES

## 2020-02-24 RX ORDER — AZITHROMYCIN 250 MG/1
500 TABLET, FILM COATED ORAL DAILY
Qty: 6 TAB | Refills: 0 | Status: SHIPPED | OUTPATIENT
Start: 2020-02-24 | End: 2020-02-27

## 2020-02-24 RX ORDER — GUAIFENESIN 600 MG/1
600 TABLET, EXTENDED RELEASE ORAL 2 TIMES DAILY
COMMUNITY
End: 2021-03-29

## 2020-02-27 ENCOUNTER — TELEPHONE (OUTPATIENT)
Dept: FAMILY MEDICINE CLINIC | Age: 65
End: 2020-02-27

## 2020-02-27 NOTE — TELEPHONE ENCOUNTER
The patient needed a work note that she was in the office on 2/24. The patient will like to return to work on Monday March 2nd. The patient will like to  the note today.  Her phone number is [phone]

## 2020-02-28 NOTE — TELEPHONE ENCOUNTER
The patient called to follow up on her doctors not for work. The patient needed to  the note today.  The patient will like a phone call back as soon as possible [phone]

## 2020-03-09 ENCOUNTER — OFFICE VISIT (OUTPATIENT)
Dept: FAMILY MEDICINE CLINIC | Age: 65
End: 2020-03-09

## 2020-03-09 VITALS
WEIGHT: 107.7 LBS | HEIGHT: 66 IN | OXYGEN SATURATION: 98 % | RESPIRATION RATE: 16 BRPM | TEMPERATURE: 98.1 F | DIASTOLIC BLOOD PRESSURE: 76 MMHG | HEART RATE: 83 BPM | BODY MASS INDEX: 17.31 KG/M2 | SYSTOLIC BLOOD PRESSURE: 118 MMHG

## 2020-03-09 DIAGNOSIS — J02.0 STREP PHARYNGITIS: Primary | ICD-10-CM

## 2020-03-09 DIAGNOSIS — R53.82 CHRONIC FATIGUE: ICD-10-CM

## 2020-03-09 DIAGNOSIS — J06.9 VIRAL URI WITH COUGH: ICD-10-CM

## 2020-03-09 DIAGNOSIS — J30.89 SEASONAL ALLERGIC RHINITIS DUE TO OTHER ALLERGIC TRIGGER: ICD-10-CM

## 2020-03-09 DIAGNOSIS — I10 ESSENTIAL HYPERTENSION WITH GOAL BLOOD PRESSURE LESS THAN 140/90: ICD-10-CM

## 2020-03-09 DIAGNOSIS — R05.8 COUGH PRESENT FOR GREATER THAN 3 WEEKS: ICD-10-CM

## 2020-03-09 RX ORDER — METHYLPREDNISOLONE 4 MG/1
TABLET ORAL
Qty: 1 DOSE PACK | Refills: 0 | Status: SHIPPED | OUTPATIENT
Start: 2020-03-09 | End: 2020-12-28

## 2020-03-09 RX ORDER — AMOXICILLIN 875 MG/1
875 TABLET, FILM COATED ORAL 2 TIMES DAILY
Qty: 20 TAB | Refills: 0 | Status: SHIPPED | OUTPATIENT
Start: 2020-03-09 | End: 2020-03-19

## 2020-03-09 NOTE — PATIENT INSTRUCTIONS
Start Amoxil 875 mg twice a day for 10 days for sore throat and L ear pain. Start medrol dose pack for the cough. Continue with Zyrtec, Singulair, and Flonase at night for allergies.
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
## 2020-03-09 NOTE — PROGRESS NOTES
Identified pt with two pt identifiers(name and ). Reviewed record in preparation for visit and have obtained necessary documentation. Chief Complaint   Patient presents with   [address] Maintenance Due   Topic    Breast Cancer Screen Mammogram     Lipid Screen     PAP AKA CERVICAL CYTOLOGY         Visit Vitals  /76 (BP 1 Location: Left arm, BP Patient Position: Sitting)   Pulse 83   Temp 98.1 °F (36.7 °C) (Oral)   Resp 16   Ht 5' 6\" (1.676 m)   Wt 107 lb 11.2 oz (48.9 kg)   SpO2 98%   BMI 17.38 kg/m²     Pain Scale: 0 - No pain/10    Coordination of Care Questionnaire:  :   1. Have you been to the ER, urgent care clinic since your last visit? Hospitalized since your last visit? No    2. Have you seen or consulted any other health care providers outside of the [address] [place] [address] since your last visit? Include any pap smears or colon screening.  No

## 2020-04-02 NOTE — TELEPHONE ENCOUNTER
PCP: Benton Franklin DO    Last appt: 3/9/2020  Future Appointments   Date Time Provider Epifanio Bibi   4/13/2020 12:00 PM Benton Franklin DO [facility]       Requested Prescriptions     Pending Prescriptions Disp Refills    lisinopriL (PRINIVIL, ZESTRIL) 20 mg tablet [Pharmacy Med Name: LISINOPRIL 20 MG TABLET] 90 Tab 3     Sig: TAKE 1 TABLET BY MOUTH [id number]    Patient has 5 days' supply of medication available.     Prior labs and Blood pressures:  BP Readings from Last 3 Encounters:   03/09/20 118/76   02/24/20 121/72   02/19/20 91/58     Lab Results   Component Value Date/Time    Sodium 143 08/09/2019 11:04 AM    Potassium 4.6 08/09/2019 11:04 AM    Chloride 104 08/09/2019 11:04 AM    CO2 26 08/09/2019 11:04 AM    Anion gap 6 01/10/2018 01:14 PM    Glucose 83 08/09/2019 11:04 AM    BUN 11 08/09/2019 11:04 AM    Creatinine 0.77 08/09/2019 11:04 AM    BUN/Creatinine ratio 14 08/09/2019 11:04 AM    GFR est AA 95 08/09/2019 11:04 AM    GFR est non-AA 82 08/09/2019 11:04 AM    Calcium 9.8 08/09/2019 11:04 AM     Lab Results   Component Value Date/Time    Hemoglobin A1c 5.1 03/21/2017 08:11 AM     Lab Results   Component Value Date/Time    Cholesterol, total 157 01/25/2019 10:59 AM    HDL Cholesterol 66 01/25/2019 10:59 AM    LDL, calculated 81 01/25/2019 10:59 AM    VLDL, calculated 10 01/25/2019 10:59 AM    Triglyceride 52 01/25/2019 10:59 AM    CHOL/HDL Ratio 3.2 10/18/2010 11:28 AM     Lab Results   Component Value Date/Time    Vitamin D 25-Hydroxy 15.1 (L) 04/11/2011 09:00 AM    VITAMIN D, 25-HYDROXY 54.9 03/28/2019 10:32 AM       Lab Results   Component Value Date/Time    TSH 2.020 01/25/2019 10:59 AM

## 2020-04-03 NOTE — TELEPHONE ENCOUNTER
Pt had #90 w 3RF Lisinopril 12/2019. She needs to contact her pharmacist to request her refill or does she have a new insurance requiring new Rx?

## 2020-04-04 DIAGNOSIS — M25.511 CHRONIC RIGHT SHOULDER PAIN: ICD-10-CM

## 2020-04-04 DIAGNOSIS — G89.29 CHRONIC RIGHT SHOULDER PAIN: ICD-10-CM

## 2020-04-06 NOTE — TELEPHONE ENCOUNTER
----- Message from Priscilla Abrams sent at 4/6/2020 10:02 AM EDT -----  Regarding: Dr. Deng Mendiola: [phone]  Name of medication and dosage if known: Lisinopril- 20mg  Is patient out of this medication (yes/no): Yes    Pharmacy name: [facility]  Pharmacy listed in chart? (yes/no): Yes  Pharmacy Phone Number: [phone]  Date of last visit: Monday, Monday, March 09, 2020 12:00 PM    Details to clarify the request: Pt states that she and her pharmacy have sent in refill request with no response back.

## 2020-04-07 RX ORDER — LISINOPRIL 20 MG/1
TABLET ORAL
Qty: 90 TAB | Refills: 3 | Status: SHIPPED | OUTPATIENT
Start: 2020-04-07 | End: 2021-01-29

## 2020-04-07 RX ORDER — MELOXICAM 7.5 MG/1
TABLET ORAL
Qty: 60 TAB | Refills: 2 | Status: SHIPPED | OUTPATIENT
Start: 2020-04-07 | End: 2022-01-19

## 2020-04-17 ENCOUNTER — TELEPHONE (OUTPATIENT)
Dept: FAMILY MEDICINE CLINIC | Age: 65
End: 2020-04-17

## 2020-04-17 DIAGNOSIS — Z20.5 EXPOSURE TO HEPATITIS B: Primary | ICD-10-CM

## 2020-04-17 NOTE — TELEPHONE ENCOUNTER
----- Message from Zacarias Gonzalez sent at 4/16/2020  9:25 AM EDT -----  Regarding: Dr. Annelle Severin      Patient stated her  tested positive for Hep B and she was calling to discuss with Dr. Sadie Jin how that would impact her. Best contact number is [phone].

## 2020-04-17 NOTE — TELEPHONE ENCOUNTER
Called patient. ID verified with Name and . Spoke with patient to receive more information in regards to her  testing positive for Hep B and her concerns. Patient stated that 's did not have plans to test her and that is why she called our office. Patient states that she is unsure if she has ever received the Hep B series. Informed patient that she should schedule VV for testing and Hep series. Patient was fine with that. Information forwarded over to [facility] for scheduling. Patient states she understood information provided to her today and that she had not further questions.

## 2020-04-17 NOTE — TELEPHONE ENCOUNTER
Lab orders for HBV, hep panel and HIV have been entered. We can mail it to patient and she can follow up with me within the next month. Results will appear in [facility] once she has them drawn.

## 2020-04-17 NOTE — TELEPHONE ENCOUNTER
Details please. Does pt's husbands doctor who found him positive also plan to test her for hepatitis panel? If HbsAg (Hep B surface antigen) is positive it indicates current HBV infection. If so, ok to do that. If not, she will need to schedule a virtual visit for testing and to get Hep B vaccine. Has she ever had the Hep B series?

## 2020-04-20 ENCOUNTER — VIRTUAL VISIT (OUTPATIENT)
Dept: FAMILY MEDICINE CLINIC | Age: 65
End: 2020-04-20

## 2020-04-20 VITALS
DIASTOLIC BLOOD PRESSURE: 81 MMHG | HEART RATE: 70 BPM | WEIGHT: 107 LBS | SYSTOLIC BLOOD PRESSURE: 108 MMHG | BODY MASS INDEX: 17.27 KG/M2 | TEMPERATURE: 96.9 F

## 2020-04-20 DIAGNOSIS — E55.9 VITAMIN D DEFICIENCY: ICD-10-CM

## 2020-04-20 DIAGNOSIS — I10 ESSENTIAL HYPERTENSION WITH GOAL BLOOD PRESSURE LESS THAN 140/90: Primary | ICD-10-CM

## 2020-04-20 DIAGNOSIS — D72.818 OTHER DECREASED WHITE BLOOD CELL (WBC) COUNT: ICD-10-CM

## 2020-04-20 DIAGNOSIS — R63.6 UNDERWEIGHT: ICD-10-CM

## 2020-04-20 DIAGNOSIS — Z20.5 EXPOSURE TO HEPATITIS B: ICD-10-CM
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
## 2020-04-20 NOTE — Clinical Note
Frankie guzman. This dear lady has been recently exposed to Hep B. She will need labs and the Hep B series in a green clinic. Would you mind help facilitating this?   Thanks, Dr. Andrea De Leon

## 2020-04-20 NOTE — TELEPHONE ENCOUNTER
Pt concerns addressed at there virtual visit today. Will need to refer to a green clinic to have Hep B results discussed and Hep B vaccine administered. Message forwarded to practice administrator for assistance.

## 2020-04-20 NOTE — PATIENT INSTRUCTIONS
[facility] - [phone] [facility] [place]. Marcus Roman , [place], [address] 
[phone]

## 2020-04-20 NOTE — PROGRESS NOTES
Leena Cox is a 59 y.o. female  Chief Complaint   Patient presents with    Hepatitis B     fist wife of  positive fo hep b and  positive for hep b      Health Maintenance Due   Topic Date Due    Breast Cancer Screen Mammogram  03/28/2018    Lipid Screen  01/25/2020    PAP AKA CERVICAL CYTOLOGY  03/28/2020     There were no vitals taken for this visit. 1. Have you been to the ER, urgent care clinic since your last visit? Hospitalized since your last visit? No    2. Have you seen or consulted any other health care providers outside of the [address] [place] [address] since your last visit? Include any pap smears or colon screening.  No

## 2020-04-22 ENCOUNTER — TELEPHONE (OUTPATIENT)
Dept: FAMILY MEDICINE CLINIC | Age: 65
End: 2020-04-22

## 2020-04-22 NOTE — TELEPHONE ENCOUNTER
Primary care [facility] ([address]) was called to set up an apt for pt. So that pt can get her Hep B vaccine administered. Talked Victor Goodell. pt scheduled for  tomorrow afternoon at 1:10 pm . Pull up to front door for temp check and then wait for call to come in. Pt called and said that she cant do it tomorrow, so I called Primary care [facility] back to set something up for Friday. Victor Goodell asked if pt could do it today. Pt called back and asked to call [facility] to speak with kacy and let her know if she can do it today or if she needs another time for it. Pt acknowledged and said she would call them to get the time straight.

## 2020-04-27 ENCOUNTER — TELEPHONE (OUTPATIENT)
Dept: FAMILY MEDICINE CLINIC | Age: 65
End: 2020-04-27

## 2020-04-27 ENCOUNTER — CLINICAL SUPPORT (OUTPATIENT)
Dept: INTERNAL MEDICINE CLINIC | Age: 65
End: 2020-04-27

## 2020-04-27 VITALS
RESPIRATION RATE: 17 BRPM | TEMPERATURE: 98.1 F | DIASTOLIC BLOOD PRESSURE: 80 MMHG | HEIGHT: 66 IN | HEART RATE: 75 BPM | SYSTOLIC BLOOD PRESSURE: 116 MMHG | WEIGHT: 107.4 LBS | BODY MASS INDEX: 17.26 KG/M2 | OXYGEN SATURATION: 96 %

## 2020-04-27 DIAGNOSIS — Z23 ENCOUNTER FOR IMMUNIZATION: Primary | ICD-10-CM

## 2020-04-27 NOTE — TELEPHONE ENCOUNTER
Pt called Verified pt name and date of birth  Pt called in regards to Where she would like to have her labs done. .   Pt stated that she would like to have them done at [facility].  Pt notified that we would fax her labs to that location.

## 2020-04-27 NOTE — PROGRESS NOTES
Chief Complaint   Patient presents with    Immunization/Injection     Hep B     Visit Vitals  /80 (BP 1 Location: Left arm, BP Patient Position: Sitting)   Pulse 75   Temp 98.1 °F (36.7 °C) (Oral)   Resp 17   Ht 5' 6\" (1.676 m)   Wt 107 lb 6.4 oz (48.7 kg)   SpO2 96%   BMI 17.33 kg/m²     Per verbal order of Dr. Joselyn Carpenter and after obtaining consent from patient, Hepatitis B  Vaccine dose 1 was given by Jeni Ma and verified by Nikki Raya. Patient received Hepatitis B 20mcg/mL IM in left deltoid. Patient tolterated well with no reactions noted. Patient will report any reactions and will schedule next dose in 1 month.

## 2020-04-28 LAB
25(OH)D3+25(OH)D2 SERPL-MCNC: 40.6 NG/ML (ref 30–100)
ALBUMIN SERPL-MCNC: 4.5 G/DL (ref 3.8–4.8)
ALBUMIN/CREAT UR: 2 MG/G CREAT (ref 0–29)
ALBUMIN/GLOB SERPL: 2.1 {RATIO} (ref 1.2–2.2)
ALP SERPL-CCNC: 68 IU/L (ref 39–117)
ALT SERPL-CCNC: 12 IU/L (ref 0–32)
APPEARANCE UR: CLEAR
AST SERPL-CCNC: 19 IU/L (ref 0–40)
BILIRUB SERPL-MCNC: 0.8 MG/DL (ref 0–1.2)
BILIRUB UR QL STRIP: NEGATIVE
BUN SERPL-MCNC: 13 MG/DL (ref 8–27)
BUN/CREAT SERPL: 15 (ref 12–28)
CALCIUM SERPL-MCNC: 9.6 MG/DL (ref 8.7–10.3)
CHLORIDE SERPL-SCNC: 102 MMOL/L (ref 96–106)
CHOLEST SERPL-MCNC: 177 MG/DL (ref 100–199)
CO2 SERPL-SCNC: 25 MMOL/L (ref 20–29)
COLOR UR: YELLOW
CREAT SERPL-MCNC: 0.87 MG/DL (ref 0.57–1)
CREAT UR-MCNC: 148.6 MG/DL
ERYTHROCYTE [DISTWIDTH] IN BLOOD BY AUTOMATED COUNT: 11.8 % (ref 11.7–15.4)
ERYTHROCYTE [SEDIMENTATION RATE] IN BLOOD BY WESTERGREN METHOD: 7 MM/HR (ref 0–40)
GLOBULIN SER CALC-MCNC: 2.1 G/DL (ref 1.5–4.5)
GLUCOSE SERPL-MCNC: 80 MG/DL (ref 65–99)
GLUCOSE UR QL: NEGATIVE
HAV IGM SERPL QL IA: NEGATIVE
HBV CORE AB SERPL QL IA: POSITIVE
HBV CORE IGM SERPL QL IA: NEGATIVE
HBV SURFACE AB SER QL: REACTIVE
HBV SURFACE AG SERPL QL IA: NEGATIVE
HCT VFR BLD AUTO: 38.1 % (ref 34–46.6)
HCV AB S/CO SERPL IA: <0.1 S/CO RATIO (ref 0–0.9)
HDLC SERPL-MCNC: 60 MG/DL
HGB BLD-MCNC: 12.6 G/DL (ref 11.1–15.9)
HGB UR QL STRIP: NEGATIVE
HIV 1+2 AB+HIV1 P24 AG SERPL QL IA: NON REACTIVE
INTERPRETATION, 910389: NORMAL
KETONES UR QL STRIP: NEGATIVE
LDLC SERPL CALC-MCNC: 107 MG/DL (ref 0–99)
LEUKOCYTE ESTERASE UR QL STRIP: NEGATIVE
MCH RBC QN AUTO: 31.7 PG (ref 26.6–33)
MCHC RBC AUTO-ENTMCNC: 33.1 G/DL (ref 31.5–35.7)
MCV RBC AUTO: 96 FL (ref 79–97)
MICRO URNS: NORMAL
MICROALBUMIN UR-MCNC: 3.4 UG/ML
NITRITE UR QL STRIP: NEGATIVE
PH UR STRIP: 5.5 [PH] (ref 5–7.5)
PLATELET # BLD AUTO: 257 X10E3/UL (ref 150–450)
POTASSIUM SERPL-SCNC: 5.2 MMOL/L (ref 3.5–5.2)
PROT SERPL-MCNC: 6.6 G/DL (ref 6–8.5)
PROT UR QL STRIP: NEGATIVE
RBC # BLD AUTO: 3.97 X10E6/UL (ref 3.77–5.28)
SODIUM SERPL-SCNC: 141 MMOL/L (ref 134–144)
SP GR UR: 1.02 (ref 1–1.03)
TRIGL SERPL-MCNC: 52 MG/DL (ref 0–149)
UROBILINOGEN UR STRIP-MCNC: 0.2 MG/DL (ref 0.2–1)
VLDLC SERPL CALC-MCNC: 10 MG/DL (ref 5–40)
WBC # BLD AUTO: 3.5 X10E3/UL (ref 3.4–10.8)

## 2020-05-28 ENCOUNTER — CLINICAL SUPPORT (OUTPATIENT)
Dept: INTERNAL MEDICINE CLINIC | Age: 65
End: 2020-05-28

## 2020-05-28 VITALS
DIASTOLIC BLOOD PRESSURE: 78 MMHG | HEART RATE: 67 BPM | BODY MASS INDEX: 17.04 KG/M2 | OXYGEN SATURATION: 96 % | TEMPERATURE: 98.3 F | SYSTOLIC BLOOD PRESSURE: 120 MMHG | RESPIRATION RATE: 16 BRPM | HEIGHT: 66 IN | WEIGHT: 106 LBS

## 2020-05-28 DIAGNOSIS — Z23 ENCOUNTER FOR IMMUNIZATION: Primary | ICD-10-CM

## 2020-05-28 NOTE — PATIENT INSTRUCTIONS
Vaccine Information Statement Hepatitis B Vaccine: What You Need to Know Many Vaccine Information Statements are available in [place]ican and other languages. See www.immunize.org/vis Hojas de información sobre vacunas están disponibles en español y en muchos otros idiomas. Visite www.immunize.org/vis 1. Why get vaccinated? Hepatitis B vaccine can prevent hepatitis B. Hepatitis B is a liver disease that can cause mild illness lasting a few weeks, or it can lead to a serious, lifelong illness.  Acute hepatitis B infection is a short-term illness that can lead to fever, fatigue, loss of appetite, nausea, vomiting, jaundice (yellow skin or eyes, dark urine, henrique-colored bowel movements), and pain in the muscles, joints, and stomach.  Chronic hepatitis B infection is a long-term illness that occurs when the hepatitis B virus remains in a persons body. Most people who go on to develop chronic hepatitis B do not have symptoms, but it is still very serious and can lead to liver damage (cirrhosis), liver cancer, and death. Chronically-infected people can spread hepatitis B virus to others, even if they do not feel or look sick themselves. Hepatitis B is spread when blood, semen, or other body fluid infected with the hepatitis B virus enters the body of a person who is not infected. People can become infected through:  Birth (if a mother has hepatitis B, her baby can become infected)  Sharing items such as razors or toothbrushes with an infected person  Contact with the blood or open sores of an infected person  Sex with an infected partner  Sharing needles, syringes, or other drug-injection equipment  Exposure to blood from needlesticks or other sharp instruments Most people who are vaccinated with hepatitis B vaccine are immune for life. 2. Hepatitis B vaccine Hepatitis B vaccine is usually given as 2, 3, or 4 shots. Infants should get their first dose of hepatitis B vaccine at birth and will usually complete the series at 10months of age (sometimes it will take longer than 6 months to complete the series). Children and adolescents younger than 23years of age who have not yet gotten the vaccine should also be vaccinated. Hepatitis B vaccine is also recommended for certain unvaccinated adults:   
 People whose sex partners have hepatitis B 
 Sexually active persons who are not in a long-term monogamous relationship  Persons seeking evaluation or treatment for a sexually transmitted disease  Men who have sexual contact with other men  People who share needles, syringes, or other drug-injection equipment  People who have household contact with someone infected with the hepatitis B virus [facility] and public safety workers at risk for exposure to blood or body fluids  Residents and staff of facilities for developmentally disabled persons  Persons in correctional facilities  Victims of sexual assault or abuse  Travelers to regions with increased rates of hepatitis B 
 People with chronic liver disease, kidney disease, HIV infection, infection with hepatitis C, or diabetes  Anyone who wants to be protected from hepatitis B Hepatitis B vaccine may be given at the same time as other vaccines. 3. Talk with your health care provider Tell your vaccine provider if the person getting the vaccine: 
 Has had an allergic reaction after a previous dose of hepatitis B vaccine, or has any severe, life-threatening allergies. In some cases, your health care provider may decide to postpone hepatitis B vaccination to a future visit. People with minor illnesses, such as a cold, may be vaccinated. People who are moderately or severely ill should usually wait until they recover before getting hepatitis B vaccine. Your health care provider can give you more information. 4. Risks of a vaccine reaction  Soreness where the shot is given or fever can happen after hepatitis B vaccine. People sometimes faint after medical procedures, including vaccination. Tell your provider if you feel dizzy or have vision changes or ringing in the ears. As with any medicine, there is a very remote chance of a vaccine causing a severe allergic reaction, other serious injury, or death. 5. What if there is a serious problem? An allergic reaction could occur after the vaccinated person leaves the clinic. If you see signs of a severe allergic reaction (hives, swelling of the face and throat, difficulty breathing, a fast heartbeat, dizziness, or weakness), call 9-1-1 and get the person to the nearest hospital. 
 
For other signs that concern you, call your health care provider. Adverse reactions should be reported to the Vaccine Adverse Event Reporting System (VAERS). Your health care provider will usually file this report, or you can do it yourself. Visit the VAERS website at www.vaers. hhs.gov or call 8-729.405.5436. VAERS is only for reporting reactions, and VAERS staff do not give medical advice. 6. The National Vaccine Injury Compensation Program 
 
The [employer] [place] Vaccine Injury Compensation Program (VICP) is a federal program that was created to compensate people who may have been injured by certain vaccines. Visit the VICP website at www.hrsa.gov/vaccinecompensation or call 7-297.168.6139 to learn about the program and about filing a claim. There is a time limit to file a claim for compensation. 7. How can I learn more?  Ask your health care provider.  Call your local or state health department.  Contact the Centers for Disease Control and Prevention (CDC): 
- Call 2-957.742.1690 (1-800-CDC-INFO) or 
- Visit CDCs website at www.cdc.gov/vaccines Vaccine Information Statement (Interim) Hepatitis B Vaccine 8/15/2019 
[address] [id number] Department of Health and [employer] Centers for Disease Control and Prevention Office Use Only

## 2020-05-28 NOTE — PROGRESS NOTES
After obtaining verbal consent and per orders of Dr. Braden Red, injection of Hep B given by Jessica Callejas LPN. Order and injection/medication verified by second nurse/ma review by Raford Nissen. Patient tolerated procedure well.  No reactions noted

## 2020-07-09 ENCOUNTER — OFFICE VISIT (OUTPATIENT)
Dept: PRIMARY CARE CLINIC | Age: 65
End: 2020-07-09

## 2020-07-09 VITALS — OXYGEN SATURATION: 99 % | HEART RATE: 89 BPM | TEMPERATURE: 98.2 F

## 2020-07-09 DIAGNOSIS — Z20.822 EXPOSURE TO COVID-19 VIRUS: Primary | ICD-10-CM

## 2020-07-09 NOTE — PROGRESS NOTES
Patient is being seen at the [facility]. 60. Please see scanned documentation as well for further information. S:  Ms. Mj Callejas presents for Covid testing due to exposure to an individual positive for Covid 19. Patient denies current Covid type symptoms. Also travelled to [place] last week. O:    Visit Vitals  Pulse 89   Temp 98.2 °F (36.8 °C)   SpO2 99%     Alert and oriented  No acute distress, no increased work of breathing  Normocephalic, atraumatic  Skin color normal  Calm and cooperative  Voice clear, conversant without shortness of breath  Conjunctiva normal    A/P:  Exposure to Covid 19    Covid 19 testing performed  Patient understands she will be contacted with the results. Monitor for development of symptoms and follow up prn with primary care provider.

## 2020-07-15 LAB — SARS-COV-2, NAA: NOT DETECTED

## 2020-09-16 ENCOUNTER — PATIENT MESSAGE (OUTPATIENT)
Dept: INTERNAL MEDICINE CLINIC | Age: 65
End: 2020-09-16

## 2020-09-16 NOTE — TELEPHONE ENCOUNTER
From: Mis Chris  To: Levy Gray DO  Sent: 9/16/2020 9:51 AM EDT  Subject: Appointment Request (HM)    Appointment Request From: Mis Chris    With Provider: Levy Gray DO [-Primary Care Physician-]    Preferred Date Range: From 9/16/2020 To 9/30/2020    Preferred Times: Any    Reason: To address the following health maintenance concerns. Flu Vaccine  Shingrix Vaccine Age 50>    Comments:  I was also to do a follow-up with Dr Kiesha Hayes.  Could we schedule all at same time if she is doing in-person visits

## 2020-10-29 ENCOUNTER — TELEPHONE (OUTPATIENT)
Dept: FAMILY MEDICINE CLINIC | Age: 65
End: 2020-10-29

## 2020-10-29 NOTE — TELEPHONE ENCOUNTER
Name: Nga Marr MRN: [id number]    Date: 10/30/2020 Status: Elizabeth    Time: 1:30 PM Length: 30 [phone]   Visit Type: NURSE VISIT [[id number]] Copay: $10.00    Provider: [email or web address] Department: [facility]    Referral #:   Referral Status:      Referring Provider:   Patient Type:      Notes: hep shot    Disposition Notes:      Made On: 10/29/2020 9:55 AM By: Tracy Donnelly    Referral Status        Patient Guarantor Accounts    Guar Acct Name Jhonatan Status Guar # SA Type Fin Class Balance Acct Status   Ebony Grover Elapsed [id number] [address] P/F [employer]ET[employer] 0.00                 Cvg Status Payor/Plan Subscriber Eff From Eff To     Elapsed BLUE CROSS/[place] BLUE CROSS [facility] PPO Ebony Grover 01/01/2016

## 2020-10-30 NOTE — TELEPHONE ENCOUNTER
Appointment Information   Name: Lloyd Morales MRN: [id number]    Date: 11/3/2020 Status: Elizabeth    Time: 10:00 AM Length: 30 [id number]   Visit Type: NURSE VISIT [[id number]] Copay: $10.00    Provider: NURSETerence_TONE Department: [facility]    Referral #:   Referral Status:      Referring Provider:   Patient Type:      Notes: hep shot    Disposition Notes:      Rescheduled: 10/30/2020 10:29 AM By: Zuly Santana    Referral Status        Fri Oct 30, 2020 1330        Patient Guarantor Accounts    Guar Acct Name Jhonatan Status Guar # SA Type Fin Class Balance Acct Status   Ebony Grover Elapsed [id number] [address] P/F [employer]ET[employer] 0.00                 Cvg Status Payor/Plan Subscriber Eff From Eff To     Elapsed [facility]/[place] BLUE [facility] PPO Ebony Grover 2016     Patient Demographics for Robert Stephenson [[id number]]   Legal Name: Lloyd Morales N [id number]   : 1955 Gender: Female   Age: 72 yrs Home Phone: [phone]   Address: [address] [place] [address]

## 2020-10-30 NOTE — TELEPHONE ENCOUNTER
The patient rescheduled her nurse visit for 10/30/20 at 1:30pm to 11/3/2020 at 10am. Is this time slot ok for a nurse visit?

## 2020-11-06 NOTE — TELEPHONE ENCOUNTER
Patient's appointment needs to be canceled as we are no longer doing flu clinic at this time.  Patient needs to be informed that we are requesting that patient's contact local pharmacy for flu and pneumonia vaccines

## 2020-11-19 ENCOUNTER — CLINICAL SUPPORT (OUTPATIENT)
Dept: FAMILY MEDICINE CLINIC | Age: 65
End: 2020-11-19
Payer: COMMERCIAL

## 2020-11-19 VITALS
HEART RATE: 71 BPM | OXYGEN SATURATION: 97 % | DIASTOLIC BLOOD PRESSURE: 97 MMHG | RESPIRATION RATE: 17 BRPM | TEMPERATURE: 97 F | BODY MASS INDEX: 17.49 KG/M2 | WEIGHT: 108.8 LBS | HEIGHT: 66 IN | SYSTOLIC BLOOD PRESSURE: 155 MMHG

## 2020-11-19 DIAGNOSIS — Z23 ENCOUNTER FOR IMMUNIZATION: Primary | ICD-10-CM

## 2020-11-19 PROCEDURE — 90746 HEPB VACCINE 3 DOSE ADULT IM: CPT

## 2020-11-19 PROCEDURE — 90471 IMMUNIZATION ADMIN: CPT

## 2020-11-19 NOTE — PATIENT INSTRUCTIONS
Vaccine Information Statement    Hepatitis B Vaccine: What You Need to Know    Many Vaccine Information Statements are available in German and other languages. See www.immunize.org/vis  Hojas de información sobre vacunas están disponibles en español y en muchos otros idiomas. Visite www.immunize.org/vis    1. Why get vaccinated? Hepatitis B vaccine can prevent hepatitis B. Hepatitis B is a liver disease that can cause mild illness lasting a few weeks, or it can lead to a serious, lifelong illness.  Acute hepatitis B infection is a short-term illness that can lead to fever, fatigue, loss of appetite, nausea, vomiting, jaundice (yellow skin or eyes, dark urine, henrique-colored bowel movements), and pain in the muscles, joints, and stomach.  Chronic hepatitis B infection is a long-term illness that occurs when the hepatitis B virus remains in a persons body. Most people who go on to develop chronic hepatitis B do not have symptoms, but it is still very serious and can lead to liver damage (cirrhosis), liver cancer, and death. Chronically-infected people can spread hepatitis B virus to others, even if they do not feel or look sick themselves. Hepatitis B is spread when blood, semen, or other body fluid infected with the hepatitis B virus enters the body of a person who is not infected. People can become infected through:  [employer] (if a mother has hepatitis B, her baby can become infected)   Sharing items such as razors or toothbrushes with an infected person   Contact with the blood or open sores of an infected person   Sex with an infected partner   Sharing needles, syringes, or other drug-injection equipment   Exposure to blood from needlesticks or other sharp instruments    Most people who are vaccinated with hepatitis B vaccine are immune for life. 2. Hepatitis B vaccine    Hepatitis B vaccine is usually given as 2, 3, or 4 shots.     Infants should get their first dose of hepatitis B vaccine at birth and will usually complete the series at 7 months of age (sometimes it will take longer than 6 months to complete the series). Children and adolescents younger than 23years of age who have not yet gotten the vaccine should also be vaccinated. Hepatitis B vaccine is also recommended for certain unvaccinated adults:     People whose sex partners have hepatitis B   Sexually active persons who are not in a long-term monogamous relationship   Persons seeking evaluation or treatment for a sexually transmitted disease   Men who have sexual contact with other men   People who share needles, syringes, or other drug-injection equipment   People who have household contact with someone infected with the hepatitis B virus  [facility] [employer] care and public safety workers at risk for exposure to blood or body fluids   Residents and staff of facilities for developmentally disabled persons   Persons in correctional facilities   Victims of sexual assault or abuse   Travelers to regions with increased rates of hepatitis B   People with chronic liver disease, kidney disease, HIV infection, infection with hepatitis C, or diabetes   Anyone who wants to be protected from hepatitis B    Hepatitis B vaccine may be given at the same time as other vaccines. 3. Talk with your health care provider    Tell your vaccine provider if the person getting the vaccine:   Has had an allergic reaction after a previous dose of hepatitis B vaccine, or has any severe, life-threatening allergies. In some cases, your health care provider may decide to postpone hepatitis B vaccination to a future visit. People with minor illnesses, such as a cold, may be vaccinated. People who are moderately or severely ill should usually wait until they recover before getting hepatitis B vaccine. Your health care provider can give you more information.     4. Risks of a vaccine reaction     Soreness where the shot is given or fever can happen after hepatitis B vaccine. People sometimes faint after medical procedures, including vaccination. Tell your provider if you feel dizzy or have vision changes or ringing in the ears. As with any medicine, there is a very remote chance of a vaccine causing a severe allergic reaction, other serious injury, or death. 5. What if there is a serious problem? An allergic reaction could occur after the vaccinated person leaves the clinic. If you see signs of a severe allergic reaction (hives, swelling of the face and throat, difficulty breathing, a fast heartbeat, dizziness, or weakness), call 9-1-1 and get the person to the nearest hospital.    For other signs that concern you, call your health care provider. Adverse reactions should be reported to the Vaccine Adverse Event Reporting System (VAERS). Your health care provider will usually file this report, or you can do it yourself. Visit the VAERS website at www.vaers. hhs.gov or call 5-723.777.6605. VAERS is only for reporting reactions, and VAERS staff do not give medical advice. 6. The National Vaccine Injury Compensation Program    The [facility] Vaccine Injury Compensation Program (VICP) is a federal program that was created to compensate people who may have been injured by certain vaccines. Visit the VICP website at www.hrsa.gov/vaccinecompensation or call 6-329.583.6969 to learn about the program and about filing a claim. There is a time limit to file a claim for compensation. 7. How can I learn more?  Ask your health care provider.  Call your local or state health department.  Contact the Centers for Disease Control and Prevention (CDC):  - Call 5-489.656.5473 (1-800-CDC-INFO) or  - Visit CDCs website at www.cdc.gov/vaccines    Vaccine Information Statement (Interim)  Hepatitis B Vaccine   8/15/2019  [id number]   Department of Health and Human Services  Centers for Disease Control and Prevention    Office Use Only

## 2020-12-28 ENCOUNTER — OFFICE VISIT (OUTPATIENT)
Dept: URGENT CARE | Age: 65
End: 2020-12-28
Payer: COMMERCIAL

## 2020-12-28 VITALS — OXYGEN SATURATION: 97 % | RESPIRATION RATE: 14 BRPM | HEART RATE: 72 BPM | TEMPERATURE: 98.5 F

## 2020-12-28 DIAGNOSIS — U07.1 COVID-19: Primary | ICD-10-CM

## 2020-12-28 PROCEDURE — 99212 OFFICE O/P EST SF 10 MIN: CPT | Performed by: FAMILY MEDICINE

## 2020-12-28 NOTE — PROGRESS NOTES
This patient was seen at [address] [place] [address] Urgent Care while in their vehicle due to COVID-19 pandemic with PPE and focused examination in order to decrease community viral transmission. The patient/guardian gave verbal consent to treat. The history is provided by the patient and a relative. Had positive test 2 weeks before- here for repeat Covid      Past Medical History:   Diagnosis Date    Allergic rhinitis, cause unspecified     Arthritis     fingers and toes    Breast mass 06/1999    Right. Benign. due to Prempro. Dr. Ngozi Churchill    Chickenpox childhood    Dizziness 10/2011    Dr. Bao Jonas.  EBV positive mononucleosis syndrome 10/2010    positive titer.  Exposure to hepatitis B         Genital HSV     Head injury, closed, with concussion 10/2018    due to hitting head on trunk of vehicle. Dr. Evangelina Little. Txd w PT.    Heart palpitations 05/21/09, 01/2018    Dr. Jahaira Cunha    Hypertension     Hypoglycemia     Menopausal and postmenopausal disorder 1999    Migraine 1981    MRSA infection 12/2011    Right hip. Txd Bactrim.  Osteopenia 03/2019    Dr. Be Keane. Dr. Karry Sandifer. Dr. Nestor Valdovinos 03/2019    Other and unspecified hyperlipidemia     Raynaud phenomenon     RLS (restless legs syndrome) 2008    Shingles teenager    R arm    Vitamin D deficiency 2008        Past Surgical History:   Procedure Laterality Date    COLONOSCOPY  09/19/2016    Dr. Yeimi Paris. due q 10 yrs.  HX BREAST LUMPECTOMY Right 1999    Benign. Dr. Bland Friend HX ORTHOPAEDIC Right 10/2012    Right arm. BENIGN TUMOR REMOVED.   Dr. Lazara Ritter         Family History   Problem Relation Age of Onset    Other Mother         gallstones    Breast Cancer Mother 62        unilateral.    Hypertension Father     Stroke Father 70        x2 CVAs and several TIAs    Kidney Disease Father     Asthma Paternal Uncle         x 2    Stroke Paternal Aunt     Diabetes Paternal Aunt     Heart Attack Paternal Aunt         multiple    Other Paternal Aunt         gout    Diabetes Paternal Aunt     Colon Cancer Maternal Grandmother 79    Uterine Cancer Maternal Grandmother         uterine    Cancer Maternal Aunt         lung    Cancer Other         maternal cousins (breast)    Breast Cancer Other 35        x 2.  bilaterally.  Alzheimer Maternal Aunt         x 2    Other Paternal Grandmother         kyphosis due to dementia posturing    Dementia Paternal Grandmother     Cancer Maternal Grandfather         ?  Other Paternal Grandfather         MVA    Heart Attack Maternal Uncle 76        Social History     Socioeconomic History    Marital status:      Spouse name: Not on file    Number of children: Not on file    Years of education: Not on file    Highest education level: Not on file   Occupational History    Not on file   Social Needs    Financial resource strain: Not on file    Food insecurity     Worry: Not on file     Inability: Not on file    Transportation needs     Medical: Not on file     Non-medical: Not on file   Tobacco Use    Smoking status: Former Smoker     Packs/day: 0.50     Years: 10.00     Pack years: 5.00     Types: Cigarettes     Quit date: 1985     Years since quittin.0    Smokeless tobacco: Never Used   Substance and Sexual Activity    Alcohol use:  Yes     Alcohol/week: 2.0 standard drinks     Types: 1 Glasses of wine, 1 Cans of beer per week     Frequency: Monthly or less     Drinks per session: 1 or 2     Binge frequency: Never     Comment: Occasional    Drug use: No    Sexual activity: Yes     Partners: Male     Birth control/protection: None     Comment: post MP   Lifestyle    Physical activity     Days per week: 5 days     Minutes per session: 30 min    Stress: Not at all   Relationships    Social connections     Talks on phone: Not on file     Gets together: Not on file     Attends Evangelical service: Not on file     Active member of club or organization: Not on file     Attends meetings of clubs or organizations: Not on file     Relationship status: Not on file    Intimate partner violence     Fear of current or ex partner: Not on file     Emotionally abused: Not on file     Physically abused: Not on file     Forced sexual activity: Not on file   Other Topics Concern    Not on file   Social History Narrative    Not on file                ALLERGIES: Prempro [conj estrog-medroxyprogest ace]    Review of Systems   All other systems reviewed and are negative. Vitals:    12/28/20 1421   Pulse: 72   Resp: 14   Temp: 98.5 °F (36.9 °C)   SpO2: 97%       Physical Exam  Vitals signs and nursing note reviewed. Constitutional:       General: She is not in acute distress. Appearance: She is not ill-appearing. Pulmonary:      Effort: Pulmonary effort is normal. No respiratory distress. Breath sounds: No wheezing. MDM    Procedures      ICD-10-CM ICD-9-CM    1. COVID-19  U07.1 079.89 NOVEL CORONAVIRUS (COVID-19)     No orders of the defined types were placed in this encounter. No results found for any visits on 12/28/20. The patients condition was discussed with the patient and they understand. The patient is to follow up with primary care doctor. If signs and symptoms become worse the pt is to go to the ER. The patient is to take medications as prescribed.

## 2020-12-30 LAB — SARS-COV-2, NAA: NOT DETECTED

## 2021-03-29 ENCOUNTER — OFFICE VISIT (OUTPATIENT)
Dept: PRIMARY CARE CLINIC | Age: 66
End: 2021-03-29
Payer: COMMERCIAL

## 2021-03-29 VITALS
SYSTOLIC BLOOD PRESSURE: 130 MMHG | TEMPERATURE: 98.4 F | RESPIRATION RATE: 20 BRPM | HEART RATE: 55 BPM | DIASTOLIC BLOOD PRESSURE: 75 MMHG | WEIGHT: 105 LBS | BODY MASS INDEX: 16.88 KG/M2 | OXYGEN SATURATION: 99 % | HEIGHT: 66 IN

## 2021-03-29 DIAGNOSIS — E55.9 VITAMIN D DEFICIENCY: ICD-10-CM

## 2021-03-29 DIAGNOSIS — I10 ESSENTIAL HYPERTENSION WITH GOAL BLOOD PRESSURE LESS THAN 140/90: ICD-10-CM

## 2021-03-29 DIAGNOSIS — E78.5 DYSLIPIDEMIA: ICD-10-CM

## 2021-03-29 DIAGNOSIS — R53.83 FATIGUE, UNSPECIFIED TYPE: Primary | ICD-10-CM

## 2021-03-29 DIAGNOSIS — Z86.16 HISTORY OF COVID-19: ICD-10-CM

## 2021-03-29 DIAGNOSIS — R63.6 UNDERWEIGHT: ICD-10-CM

## 2021-03-29 DIAGNOSIS — E78.5 HYPERLIPIDEMIA, UNSPECIFIED HYPERLIPIDEMIA TYPE: ICD-10-CM

## 2021-03-29 DIAGNOSIS — J30.89 ENVIRONMENTAL AND SEASONAL ALLERGIES: ICD-10-CM

## 2021-03-29 DIAGNOSIS — A60.00 GENITAL HERPES SIMPLEX, UNSPECIFIED SITE: ICD-10-CM

## 2021-03-29 PROCEDURE — 99213 OFFICE O/P EST LOW 20 MIN: CPT | Performed by: FAMILY MEDICINE

## 2021-03-29 RX ORDER — SIMVASTATIN 40 MG/1
TABLET, FILM COATED ORAL
Qty: 90 TAB | Refills: 0 | Status: SHIPPED | OUTPATIENT
Start: 2021-03-29 | End: 2021-05-19 | Stop reason: SDUPTHER

## 2021-03-29 RX ORDER — METHOCARBAMOL 750 MG/1
TABLET, FILM COATED ORAL
COMMUNITY
Start: 2021-03-04 | End: 2022-08-23 | Stop reason: ALTCHOICE

## 2021-03-29 RX ORDER — LISINOPRIL 20 MG/1
20 TABLET ORAL DAILY
Qty: 90 TAB | Refills: 1 | Status: SHIPPED | OUTPATIENT
Start: 2021-03-29 | End: 2021-10-14

## 2021-03-29 RX ORDER — MONTELUKAST SODIUM 10 MG/1
TABLET ORAL
Qty: 90 TAB | Refills: 2 | Status: SHIPPED | OUTPATIENT
Start: 2021-03-29 | End: 2021-12-27

## 2021-03-29 NOTE — PROGRESS NOTES
Room 8    Identified pt with two pt identifiers(name and ). Reviewed record in preparation for visit and have obtained necessary documentation. All patient medications has been reviewed. Chief Complaint   Patient presents with   Boo Seeds Establish Care    Breathing Problem     pt states, was positive for COVID back in 2021.  Fatigue       Health Maintenance Due   Topic    Shingrix Vaccine Age 49> (1 of 2)    Breast Cancer Screen Mammogram      Pt is due in Health Maintenance    Learning Assessment 3/29/2021   PRIMARY LEARNER Patient   HIGHEST LEVEL OF EDUCATION - PRIMARY LEARNER  GRADUATED HIGH SCHOOL OR GED   BARRIERS PRIMARY LEARNER NONE   CO-LEARNER CAREGIVER No   CO-LEARNER NAME -   PRIMARY LANGUAGE ENGLISH    NEED No   LEARNER PREFERENCE PRIMARY DEMONSTRATION   LEARNING SPECIAL TOPICS none   ANSWERED BY patient   RELATIONSHIP SELF   ASSESSMENT COMMENT n/a     3 most recent PHQ Screens 3/29/2021   Little interest or pleasure in doing things Not at all   Feeling down, depressed, irritable, or hopeless Not at all   Total Score PHQ 2 0     Fall Risk Assessment, last 12 mths 3/29/2021   Able to walk? Yes   Fall in past 12 months? 0   Do you feel unsteady? 0   Are you worried about falling 0     Abuse Screening Questionnaire 3/29/2021   Do you ever feel afraid of your partner? N   Are you in a relationship with someone who physically or mentally threatens you? N   Is it safe for you to go home? Y       Vitals:    21 1342   BP: 130/75   Pulse: (!) 55   Resp: 20   Temp: 98.4 °F (36.9 °C)   TempSrc: Temporal   SpO2: 99%   Weight: 105 lb (47.6 kg)   Height: 5' 6\" (1.676 m)   PainSc:   0 - No pain       Coordination of Care Questionnaire:   1. Have you been to the ER, urgent care clinic since your last visit? Hospitalized since your last visit? No    2. Have you seen or consulted any other health care providers outside of the [address] [place] [address] since your last visit?   Include any pap smears or colon screening. Yes  [facility] 12[facility]01/2020. PAP smear completed 2020. Pt states, DEXA bone scan completed 2019. Pt states, not completed a colonoscopy recently. Advance Care Planning:   End of Life Planning: has NO advanced directive - not interested in additional information    Patient is accompanied by self I have received verbal consent from Hamlet Youngblood to discuss any/all medical information while they are present in the room.

## 2021-03-29 NOTE — PROGRESS NOTES
Subjective:     Chief Complaint   Patient presents with   [facility] Establish Care    Breathing Problem     pt states, was positive for COVID back in 02/2021.  Fatigue        She  is a 72y.o. year old female with hypertension, dyslipidemia, vit d deficiency, osteopenia, and genital herpes simplex presents today as a new patient to establish. She was previously seeing Dr. Mellissa Henderson. Patient reports that she was diagnosed with COVID little over a month ago and since then she has been feeling tired. She gets out of breath easily. Denies any chest pain, wheezing. HTN:  She takes lisinopril 20 mg daily for BP; tolerating well. HLD:  She takes Zocor 40 mg qPM for cholesterol; tolerating well. Genital herpes: takes valterx as needed. She has been following up with orthopedics for chronic pain. She is on Methocarbamol and Meloxicam .    She takes Singulair for allergy symptoms. Well controlled. Lab Results   Component Value Date/Time    Cholesterol, total 177 04/27/2020 11:02 AM    HDL Cholesterol 60 04/27/2020 11:02 AM    LDL, calculated 107 (H) 04/27/2020 11:02 AM    VLDL, calculated 10 04/27/2020 11:02 AM    Triglyceride 52 04/27/2020 11:02 AM    CHOL/HDL Ratio 3.2 10/18/2010 11:28 AM         A comprehensive review of systems was negative except for that written in the HPI. Objective:     Vitals:    03/29/21 1342   BP: 130/75   Pulse: (!) 55   Resp: 20   Temp: 98.4 °F (36.9 °C)   TempSrc: Temporal   SpO2: 99%   Weight: 105 lb (47.6 kg)   Height: 5' 6\" (1.676 m)       Physical Examination: General appearance - alert, well appearing, and in no distress, oriented to person, place, and time and thin appearance.   Mental status - alert, oriented to person, place, and time, normal mood, behavior, speech, dress, motor activity, and thought processes  Ears - bilateral TM's and external ear canals normal  Nose - normal and patent, no erythema, discharge or polyps  Mouth - mucous membranes moist, pharynx normal without lesions  Neck - supple, no significant adenopathy, thyroid exam: thyroid is normal in size without nodules or tenderness  Chest - clear to auscultation, no wheezes, rales or rhonchi, symmetric air entry  Heart - normal rate, regular rhythm, normal S1, S2, no murmurs, rubs, clicks or gallops  Abdomen - soft, nontender, nondistended, no masses or organomegaly  Neurological - alert, oriented, normal speech, no focal findings or movement disorder noted    Extremities - no pedal edema noted    Allergies   Allergen Reactions    Prempro [Conj Estrog-Medroxyprogest Ace] Other (comments)     Benign Right breast mass. Social History     Socioeconomic History    Marital status:      Spouse name: Not on file    Number of children: Not on file    Years of education: Not on file    Highest education level: Not on file   Tobacco Use    Smoking status: Former Smoker     Packs/day: 0.50     Years: 10.00     Pack years: 5.00     Types: Cigarettes     Quit date: 1985     Years since quittin.2    Smokeless tobacco: Never Used   Substance and Sexual Activity    Alcohol use:  Yes     Alcohol/week: 2.0 standard drinks     Types: 1 Glasses of wine, 1 Cans of beer per week     Frequency: Monthly or less     Drinks per session: 1 or 2     Binge frequency: Never     Comment: Occasional    Drug use: No    Sexual activity: Yes     Partners: Male     Birth control/protection: None     Comment: post MP   Lifestyle    Physical activity     Days per week: 5 days     Minutes per session: 30 min    Stress: Not at all      Family History   Problem Relation Age of Onset    Other Mother         gallstones    Breast Cancer Mother 62        unilateral.    Hypertension Father     Stroke Father 70        x2 CVAs and several TIAs    Kidney Disease Father     Asthma Paternal Uncle         x 2    Stroke Paternal Aunt     Diabetes Paternal Aunt     Heart Attack Paternal Aunt         multiple    Other Paternal Aunt         gout    Diabetes Paternal Aunt     Colon Cancer Maternal Grandmother 79    Uterine Cancer Maternal Grandmother         uterine    Cancer Maternal Aunt         lung    Cancer Other         maternal cousins (breast)    Breast Cancer Other 35        x 2.  bilaterally.  Alzheimer Maternal Aunt         x 2    Other Paternal Grandmother         kyphosis due to dementia posturing    Dementia Paternal Grandmother     Cancer Maternal Grandfather         ?  Other Paternal Grandfather         MVA    Heart Attack Maternal Uncle 76      Past Surgical History:   Procedure Laterality Date    COLONOSCOPY  09/19/2016    Dr. Harris Hem. due q 10 yrs.  HX BREAST LUMPECTOMY Right 1999    Benign. Dr. Elizabeth Villegas HX ORTHOPAEDIC Right 10/2012    Right arm. BENIGN TUMOR REMOVED. Dr. Briseyda Solorio      Past Medical History:   Diagnosis Date    Allergic rhinitis, cause unspecified     Arthritis     fingers and toes    Breast mass 06/1999    Right. Benign. due to Prempro. Dr. Devonte Castillo    Chickenpox childhood    Dizziness 10/2011    Dr. Brit Toney.  EBV positive mononucleosis syndrome 10/2010    positive titer.  Exposure to hepatitis B         Genital HSV     Head injury, closed, with concussion 10/2018    due to hitting head on trunk of vehicle. Dr. Chad Seay. Txd w PT.    Heart palpitations 05/21/09, 01/2018    Dr. Mcdaniel American    Hypertension     Hypoglycemia     Menopausal and postmenopausal disorder 1999    Migraine 1981    MRSA infection 12/2011    Right hip. Txd Bactrim.  Osteopenia 03/2019    Dr. Yolanda Denver. Dr. Gilford Singh. Dr. Pastor Galarza 03/2019    Other and unspecified hyperlipidemia     Raynaud phenomenon     RLS (restless legs syndrome) 2008    Shingles teenager    R arm    Vitamin D deficiency 2008      Current Outpatient Medications   Medication Sig Dispense Refill    ZINC PO Take  by mouth.  Pt states, C plus Zinc      methocarbamoL (ROBAXIN) 750 mg tablet TAKE 1 TABLET BY MOUTH 2 TIMES A DAY AS NEEDED FOR MUSCLE SPASMS FOR UP TO 10 DAYS      lisinopriL (PRINIVIL, ZESTRIL) 20 mg tablet Take 1 Tab by mouth daily. [facility] is closing. Please establish with a new practice for future refills. 90 Tab 0    montelukast (SINGULAIR) 10 mg tablet TAKE 1 TABLET BY MOUTH EVERY DAY. [facility] is closing. Please establish with a new practice for future refills. 90 Tab 0    simvastatin (ZOCOR) 40 mg tablet TAKE 1 TABLET BY MOUTH EVERY DAY AT NIGHT. [facility] is closing. Please establish with a new practice for future refills. 90 Tab 0    cholecalciferol (VITAMIN D3) 25 mcg (1,000 unit) cap Take  by mouth daily.  valACYclovir (VALTREX) 500 mg tablet TAKE 1 TABLET BY MOUTH EVERY DAY DURING FLARES 90 Tab 1    Blood Pressure Test Kit-Wrist kit Check bp daily and as needed Dx:  Hypertension I10  Indications: hypertension 1 Kit 0    aspirin delayed-release 81 mg tablet Take 1 Tab by mouth daily. 90 Tab 3    cyanocobalamin (VITAMIN B-12) 1,000 mcg tablet Take 1,000 mcg by mouth daily.  omega-3 fatty acids-vitamin e (FISH OIL) 1,000 mg Cap Take  by mouth daily.  MULTIVITAMINS (MULTIVITAMIN PO) Take  by mouth. Takes 3 times/week      meloxicam (MOBIC) 7.5 mg tablet TAKE 2 TABLETS BY MOUTH EVERY DAY 60 Tab 2    cetirizine-pseudoePHEDrine (ZYRTEC-D) 5-120 mg Tb12 Take 1 Tab by mouth two (2) times a day.  acetaminophen (TYLENOL EXTRA STRENGTH) 500 mg tablet Take  by mouth every six (6) hours as needed for Pain.  fluticasone (FLONASE) 50 mcg/actuation nasal spray 2 Sprays by Both Nostrils route daily. 1 Bottle 0        Assessment/ Plan:   Diagnoses and all orders for this visit:    1. Fatigue, unspecified type  -     CBC WITH AUTOMATED DIFF; Future  -     THYROID CASCADE PROFILE; Future               Has been feeling tired since she was diagnosed with COVID last month.                No other symptoms.               Counseling reassurance provided.  2. Essential hypertension with goal blood pressure less than 140/90  -   BP well controlled with  lisinopriL (PRINIVIL, ZESTRIL) 20 mg tablet; Take 1 Tab by mouth daily.    3. Hyperlipidemia, unspecified hyperlipidemia type  -     METABOLIC PANEL, COMPREHENSIVE; Future    4. Vitamin D deficiency  -     VITAMIN D, 25 HYDROXY; Future              Currently on Vit d 1,000 iu.  5. Genital herpes simplex, unspecified site      Stable with as needed valtrex.  6. Underweight      Patient reports that her weight is always like that. Normal appetite.   7. Environmental and seasonal allergies  -     montelukast (SINGULAIR) 10 mg tablet; TAKE 1 TABLET BY MOUTH EVERY DAY.    8. Dyslipidemia  -     simvastatin (ZOCOR) 40 mg tablet; TAKE 1 TABLET BY MOUTH EVERY DAY AT Night.    9. History of COVID-19           Medication risks/benefits/costs/interactions/alternatives discussed with patient.  Advised patient to call back or return to office if symptoms worsen/change/persist. If patient cannot reach us or should anything more severe/urgent arise he/she should proceed directly to the nearest emergency department.  Discussed expected course/resolution/complications of diagnosis in detail with patient.  Patient given a written after visit summary which includes her diagnoses, current medications and vitals.  Patient expressed understanding with the diagnosis and plan.          Follow-up and Dispositions    · Return in about 2 months (around 5/29/2021), or if symptoms worsen or fail to improve, for complete physical  and fasting blood work..

## 2021-04-06 DIAGNOSIS — R71.8 ELEVATED MCV: Primary | ICD-10-CM

## 2021-04-06 DIAGNOSIS — R53.83 FATIGUE, UNSPECIFIED TYPE: ICD-10-CM

## 2021-05-19 ENCOUNTER — OFFICE VISIT (OUTPATIENT)
Dept: PRIMARY CARE CLINIC | Age: 66
End: 2021-05-19
Payer: COMMERCIAL

## 2021-05-19 VITALS
OXYGEN SATURATION: 99 % | HEIGHT: 66 IN | BODY MASS INDEX: 16.74 KG/M2 | TEMPERATURE: 97.6 F | DIASTOLIC BLOOD PRESSURE: 64 MMHG | SYSTOLIC BLOOD PRESSURE: 127 MMHG | WEIGHT: 104.2 LBS | HEART RATE: 61 BPM | RESPIRATION RATE: 16 BRPM

## 2021-05-19 DIAGNOSIS — E78.5 HYPERLIPIDEMIA, UNSPECIFIED HYPERLIPIDEMIA TYPE: Primary | ICD-10-CM

## 2021-05-19 DIAGNOSIS — I10 ESSENTIAL HYPERTENSION WITH GOAL BLOOD PRESSURE LESS THAN 140/90: ICD-10-CM

## 2021-05-19 LAB
CHOLEST SERPL-MCNC: 218 MG/DL
HDLC SERPL-MCNC: 80 MG/DL
HDLC SERPL: 2.7 {RATIO} (ref 0–5)
LDLC SERPL CALC-MCNC: 125.8 MG/DL (ref 0–100)
TRIGL SERPL-MCNC: 61 MG/DL (ref ?–150)
VLDLC SERPL CALC-MCNC: 12.2 MG/DL

## 2021-05-19 PROCEDURE — 99213 OFFICE O/P EST LOW 20 MIN: CPT | Performed by: FAMILY MEDICINE

## 2021-05-19 RX ORDER — SIMVASTATIN 40 MG/1
TABLET, FILM COATED ORAL
Qty: 90 TABLET | Refills: 0 | Status: SHIPPED | OUTPATIENT
Start: 2021-05-19 | End: 2021-10-14

## 2021-05-19 NOTE — PROGRESS NOTES
Identified pt with two pt identifiers(name and ). Chief Complaint   Patient presents with    Cholesterol Problem        3 most recent PHQ Screens 2021   Little interest or pleasure in doing things Not at all   Feeling down, depressed, irritable, or hopeless Not at all   Total Score PHQ 2 0        Vitals:    21 1114   BP: 127/64   Pulse: 61   Resp: 16   Temp: 97.6 °F (36.4 °C)   TempSrc: Temporal   SpO2: 99%   Weight: 104 lb 3.2 oz (47.3 kg)   Height: 5' 6\" (1.676 m)   PainSc:   0 - No pain       Health Maintenance Due   Topic    Shingrix Vaccine Age 50> (1 of 2)    Breast Cancer Screen Mammogram     Lipid Screen        1. Have you been to the ER, urgent care clinic since your last visit? Hospitalized since your last visit? No    2. Have you seen or consulted any other health care providers outside of the [address] [place] [address] since your last visit? Include any pap smears or colon screening.  No

## 2021-05-19 NOTE — PROGRESS NOTES
Subjective:     Chief Complaint   Patient presents with    Cholesterol Problem        She  is a 72y.o. year old female with hx of hypertension, dyslipidemia, vit d deficiency, osteopenia, and genital herpes simplex presents today for cholesterol check up.       HTN:  She takes lisinopril 20 mg daily for BP; tolerating well.      HLD:  She takes Zocor 40 mg qPM for cholesterol; tolerating well.     Genital herpes: takes valterx as needed.     She has been following up with orthopedics for chronic pain. She is on Methocarbamol and Meloxicam .    Denies any chest pain, soa, cough. Pertinent items are noted in HPI. Objective:     Vitals:    21 1114   BP: 127/64   Pulse: 61   Resp: 16   Temp: 97.6 °F (36.4 °C)   TempSrc: Temporal   SpO2: 99%   Weight: 104 lb 3.2 oz (47.3 kg)   Height: 5' 6\" (1.676 m)       Physical Examination: General appearance - alert, well appearing, and in no distress, oriented to person, place, and time and lean appearance. Mental status - alert, oriented to person, place, and time, normal mood, behavior, speech, dress, motor activity, and thought processes  Chest - clear to auscultation, no wheezes, rales or rhonchi, symmetric air entry  Heart - normal rate, regular rhythm, normal S1, S2, no murmurs, rubs, clicks or gallops    Allergies   Allergen Reactions    Prempro [Conj Estrog-Medroxyprogest Ace] Other (comments)     Benign Right breast mass. Social History     Socioeconomic History    Marital status:      Spouse name: Not on file    Number of children: Not on file    Years of education: Not on file    Highest education level: Not on file   Tobacco Use    Smoking status: Former Smoker     Packs/day: 0.50     Years: 36.00     Pack years: 18.00     Types: Cigarettes     Quit date: 1985     Years since quittin.4    Smokeless tobacco: Never Used   Vaping Use    Vaping Use: Never used   Substance and Sexual Activity    Alcohol use:  Yes     Alcohol/week: 2.0 standard drinks     Types: 1 Glasses of wine, 1 Cans of beer per week     Comment: Occasional    Drug use: No    Sexual activity: Yes     Partners: Male     Birth control/protection: None     Comment: post MP     Social Determinants of Health     Financial Resource Strain:     Difficulty of Paying Living Expenses:    Food Insecurity:     Worried About Running Out of Food in the Last Year:     Ran Out of Food in the Last Year:    Transportation Needs:     Lack of Transportation (Medical):  Lack of Transportation (Non-Medical):    Physical Activity: Sufficiently Active    Days of Exercise per Week: 5 days    Minutes of Exercise per Session: 30 min   Stress: No Stress Concern Present    Feeling of Stress : Not at all   Social Connections:     Frequency of Communication with Friends and Family:     Frequency of Social Gatherings with Friends and Family:     Attends Christianity Services:     Active Member of Clubs or Organizations:     Attends Club or Organization Meetings:     Marital Status:       Family History   Problem Relation Age of Onset    Other Mother         gallstones    Breast Cancer Mother 62        unilateral.    Hypertension Father     Stroke Father 70        x2 CVAs and several TIAs    Kidney Disease Father     Asthma Paternal Uncle         x 2    Stroke Paternal Aunt     Diabetes Paternal Aunt     Heart Attack Paternal Aunt         multiple    Other Paternal Aunt         gout    Diabetes Paternal Aunt     Colon Cancer Maternal Grandmother 79    Uterine Cancer Maternal Grandmother         uterine    Cancer Maternal Aunt         lung    Cancer Other         maternal cousins (breast)    Breast Cancer Other 35        x 2.  bilaterally.  Alzheimer Maternal Aunt         x 2    Other Paternal Grandmother         kyphosis due to dementia posturing    Dementia Paternal Grandmother     Cancer Maternal Grandfather         ?     Other Paternal Grandfather         MVA    Heart Attack Maternal Uncle 75      Past Surgical History:   Procedure Laterality Date    COLONOSCOPY  09/19/2016    Dr. Kristopher Murphy. due q 10 yrs.  HX BREAST LUMPECTOMY Right 1999    Benign. Dr. Danny Mullen HX ORTHOPAEDIC Right 10/2012    Right arm. BENIGN TUMOR REMOVED. Dr. Anna Barron      Past Medical History:   Diagnosis Date    Allergic rhinitis, cause unspecified     Arthritis     fingers and toes    Breast mass 06/1999    Right. Benign. due to Prempro. Dr. Nataliya Greene    Chickenpox childhood    Dizziness 10/2011    Dr. Jacob Hinojosa.  EBV positive mononucleosis syndrome 10/2010    positive titer.  Exposure to hepatitis B         Genital HSV     Head injury, closed, with concussion 10/2018    due to hitting head on trunk of vehicle. Dr. Angi Leon. Txd w PT.    Heart palpitations 05/21/09, 01/2018    Dr. Sheila Fontanez    Hypertension     Hypoglycemia     Menopausal and postmenopausal disorder 1999    Migraine 1981    MRSA infection 12/2011    Right hip. Txd Bactrim.  Osteopenia 03/2019    Dr. Vani Summers. Dr. Carri Crane. Dr. Jarrod Ventura 03/2019    Other and unspecified hyperlipidemia     Raynaud phenomenon     RLS (restless legs syndrome) 2008    Shingles teenager    R arm    Vitamin D deficiency 2008      Current Outpatient Medications   Medication Sig Dispense Refill    ZINC PO Take  by mouth. Pt states, C plus Zinc      methocarbamoL (ROBAXIN) 750 mg tablet TAKE 1 TABLET BY MOUTH 2 TIMES A DAY AS NEEDED FOR MUSCLE SPASMS FOR UP TO 10 DAYS      montelukast (SINGULAIR) 10 mg tablet TAKE 1 TABLET BY MOUTH EVERY DAY. 90 Tab 2    lisinopriL (PRINIVIL, ZESTRIL) 20 mg tablet Take 1 Tab by mouth daily. 90 Tab 1    simvastatin (ZOCOR) 40 mg tablet TAKE 1 TABLET BY MOUTH EVERY DAY AT Night.  90 Tab 0    meloxicam (MOBIC) 7.5 mg tablet TAKE 2 TABLETS BY MOUTH EVERY DAY 60 Tab 2    cetirizine-pseudoePHEDrine (ZYRTEC-D) 5-120 mg Tb12 Take 1 Tab by mouth two (2) times a day.  cholecalciferol (VITAMIN D3) 25 mcg (1,000 unit) cap Take  by mouth daily.  valACYclovir (VALTREX) 500 mg tablet TAKE 1 TABLET BY MOUTH EVERY DAY DURING FLARES 90 Tab 1    acetaminophen (TYLENOL EXTRA STRENGTH) 500 mg tablet Take  by mouth every six (6) hours as needed for Pain.  Blood Pressure Test Kit-Wrist kit Check bp daily and as needed Dx:  Hypertension I10  Indications: hypertension 1 Kit 0    aspirin delayed-release 81 mg tablet Take 1 Tab by mouth daily. 90 Tab 3    fluticasone (FLONASE) 50 mcg/actuation nasal spray 2 Sprays by Both Nostrils route daily. 1 Bottle 0    omega-3 fatty acids-vitamin e (FISH OIL) 1,000 mg Cap Take  by mouth daily.  MULTIVITAMINS (MULTIVITAMIN PO) Take  by mouth. Takes 3 times/week          Assessment/ Plan:   Diagnoses and all orders for this visit:    1. Hyperlipidemia, unspecified hyperlipidemia type  -     LIPID PANEL; Future  -    continue simvastatin (ZOCOR) 40 mg tablet; TAKE 1 TABLET BY MOUTH EVERY DAY AT Night. 2. Essential hypertension with goal blood pressure less than 140/90      Well controlled with current meds. Medication risks/benefits/costs/interactions/alternatives discussed with patient. Advised patient to call back or return to office if symptoms worsen/change/persist. If patient cannot reach us or should anything more severe/urgent arise he/she should proceed directly to the nearest emergency department. Discussed expected course/resolution/complications of diagnosis in detail with patient. Patient given a written after visit summary which includes her diagnoses, current medications and vitals. Patient expressed understanding with the diagnosis and plan. Follow-up and Dispositions    · Return if symptoms worsen or fail to improve.

## 2021-05-21 NOTE — PROGRESS NOTES
Sent via my chart. Hi Ms. Grover,    Cholesterol level slightly went up from last time. Continue current med and continue work on diet and exercise. Thanks.   Dr. Annmarie Cabrera

## 2021-06-08 ENCOUNTER — OFFICE VISIT (OUTPATIENT)
Dept: PRIMARY CARE CLINIC | Age: 66
End: 2021-06-08
Payer: COMMERCIAL

## 2021-06-08 VITALS
DIASTOLIC BLOOD PRESSURE: 83 MMHG | HEART RATE: 70 BPM | BODY MASS INDEX: 16.74 KG/M2 | TEMPERATURE: 97.5 F | HEIGHT: 66 IN | WEIGHT: 104.2 LBS | RESPIRATION RATE: 16 BRPM | OXYGEN SATURATION: 99 % | SYSTOLIC BLOOD PRESSURE: 121 MMHG

## 2021-06-08 DIAGNOSIS — E78.5 HYPERLIPIDEMIA, UNSPECIFIED HYPERLIPIDEMIA TYPE: ICD-10-CM

## 2021-06-08 DIAGNOSIS — I10 ESSENTIAL HYPERTENSION WITH GOAL BLOOD PRESSURE LESS THAN 140/90: ICD-10-CM

## 2021-06-08 DIAGNOSIS — Z00.00 WELL ADULT ON ROUTINE HEALTH CHECK: Primary | ICD-10-CM

## 2021-06-08 PROCEDURE — 99397 PER PM REEVAL EST PAT 65+ YR: CPT | Performed by: FAMILY MEDICINE

## 2021-06-08 NOTE — PROGRESS NOTES
Identified pt with two pt identifiers(name and ). Chief Complaint   Patient presents with    Complete Physical      Vitals:    21 0942   BP: 121/83   Pulse: 70   Resp: 16   Temp: 97.5 °F (36.4 °C)   TempSrc: Temporal   SpO2: 99%   Weight: 104 lb 3.2 oz (47.3 kg)   Height: 5' 6\" (1.676 m)   PainSc:   0 - No pain      Health Maintenance Due   Topic    Shingrix Vaccine Age 49> (1 of 2)    Breast Cancer Screen Mammogram        Depression Screening:  :     3 most recent PHQ Screens 2021   Little interest or pleasure in doing things Not at all   Feeling down, depressed, irritable, or hopeless Not at all   Total Score PHQ 2 0        Fall Risk Assessment:  :     Fall Risk Assessment, last 12 mths 2021   Able to walk? Yes   Fall in past 12 months? 0   Do you feel unsteady? 0   Are you worried about falling 0       Abuse Screening:  :     Abuse Screening Questionnaire 2021   Do you ever feel afraid of your partner? N   Are you in a relationship with someone who physically or mentally threatens you? N   Is it safe for you to go home? Y        Coordination of Care Questionnaire:  :     1. Have you been to the ER, urgent care clinic since your last visit? Hospitalized since your last visit? No    2. Have you seen or consulted any other health care providers outside of the [address] [place] [address] since your last visit? Include any pap smears or colon screening.  No

## 2021-08-03 PROBLEM — I10 HYPERTENSION: Status: RESOLVED | Noted: 2021-08-03 | Resolved: 2021-08-03

## 2021-09-30 DIAGNOSIS — E78.5 HYPERLIPIDEMIA, UNSPECIFIED HYPERLIPIDEMIA TYPE: Primary | ICD-10-CM

## 2021-09-30 DIAGNOSIS — I10 ESSENTIAL HYPERTENSION WITH GOAL BLOOD PRESSURE LESS THAN 140/90: ICD-10-CM

## 2021-11-05 ENCOUNTER — OFFICE VISIT (OUTPATIENT)
Dept: PRIMARY CARE CLINIC | Age: 66
End: 2021-11-05
Payer: COMMERCIAL

## 2021-11-05 VITALS
BODY MASS INDEX: 17.49 KG/M2 | RESPIRATION RATE: 16 BRPM | DIASTOLIC BLOOD PRESSURE: 77 MMHG | SYSTOLIC BLOOD PRESSURE: 139 MMHG | HEART RATE: 62 BPM | HEIGHT: 66 IN | WEIGHT: 108.8 LBS | TEMPERATURE: 97.1 F | OXYGEN SATURATION: 98 %

## 2021-11-05 DIAGNOSIS — E78.5 HYPERLIPIDEMIA, UNSPECIFIED HYPERLIPIDEMIA TYPE: ICD-10-CM

## 2021-11-05 DIAGNOSIS — A60.00 GENITAL HERPES SIMPLEX, UNSPECIFIED SITE: ICD-10-CM

## 2021-11-05 DIAGNOSIS — I10 ESSENTIAL HYPERTENSION WITH GOAL BLOOD PRESSURE LESS THAN 140/90: Primary | ICD-10-CM

## 2021-11-05 PROCEDURE — 99213 OFFICE O/P EST LOW 20 MIN: CPT | Performed by: FAMILY MEDICINE

## 2021-11-05 RX ORDER — VALACYCLOVIR HYDROCHLORIDE 500 MG/1
TABLET, FILM COATED ORAL
Qty: 90 TABLET | Refills: 1 | Status: SHIPPED | OUTPATIENT
Start: 2021-11-05 | End: 2022-05-09 | Stop reason: SDUPTHER

## 2021-11-05 NOTE — PROGRESS NOTES
Chief Complaint   Patient presents with    Labs     results-       Health Maintenance Due   Topic    Pneumococcal 65+ years (2 of 2 - PPSV23)    Breast Cancer Screen Mammogram         1. Have you been to the ER, urgent care clinic since your last visit? Hospitalized since your last visit? No    2. Have you seen or consulted any other health care providers outside of the [address] [place] [address] since your last visit? Include any pap smears or colon screening. No    There were no vitals taken for this visit.

## 2021-11-05 NOTE — PROGRESS NOTES
Subjective:     Chief Complaint   Patient presents with    Labs     results-        She  is a 77y.o. year old female with hx of hypertension, dyslipidemia, vit d deficiency, osteopenia, and genital herpes simplex presents today for cholesterol check up. Lab was done already.         HTN:  She takes lisinopril 20 mg daily for BP; tolerating well.      HLD:  She takes Zocor 40 mg qPM for cholesterol; tolerating well.     Genital herpes: takes valterx as needed. Lab Results   Component Value Date/Time    Cholesterol, total 174 11/02/2021 04:37 PM    HDL Cholesterol 63 11/02/2021 04:37 PM    LDL, calculated 97.8 11/02/2021 04:37 PM    VLDL, calculated 13.2 11/02/2021 04:37 PM    Triglyceride 66 11/02/2021 04:37 PM    CHOL/HDL Ratio 2.8 11/02/2021 04:37 PM     Lab Results   Component Value Date/Time    Sodium 138 11/02/2021 04:37 PM    Potassium 4.5 11/02/2021 04:37 PM    Chloride 107 11/02/2021 04:37 PM    CO2 28 11/02/2021 04:37 PM    Anion gap 3 (L) 11/02/2021 04:37 PM    Glucose 83 11/02/2021 04:37 PM    BUN 10 11/02/2021 04:37 PM    Creatinine 0.77 11/02/2021 04:37 PM    BUN/Creatinine ratio 13 11/02/2021 04:37 PM    GFR est AA >60 11/02/2021 04:37 PM    GFR est non-AA >60 11/02/2021 04:37 PM    Calcium 9.4 11/02/2021 04:37 PM    Bilirubin, total 0.7 11/02/2021 04:37 PM    Alk. phosphatase 68 11/02/2021 04:37 PM    Protein, total 6.9 11/02/2021 04:37 PM    Albumin 3.9 11/02/2021 04:37 PM    Globulin 3.0 11/02/2021 04:37 PM    A-G Ratio 1.3 11/02/2021 04:37 PM    ALT (SGPT) 22 11/02/2021 04:37 PM    AST (SGOT) 14 (L) 11/02/2021 04:37 PM         Pertinent items are noted in HPI.   Objective:     Vitals:    11/05/21 0959   BP: 139/77   Pulse: 62   Resp: 16   Temp: 97.1 °F (36.2 °C)   TempSrc: Temporal   SpO2: 98%   Weight: 108 lb 12.8 oz (49.4 kg)   Height: 5' 6\" (1.676 m)       Physical Examination: General appearance - alert, well appearing, and in no distress and oriented to person, place, and time  Mental status - alert, oriented to person, place, and time, normal mood, behavior, speech, dress, motor activity, and thought processes  Chest - clear to auscultation, no wheezes, rales or rhonchi, symmetric air entry  Heart - normal rate, regular rhythm, normal S1, S2, no murmurs, rubs, clicks or gallops    Allergies   Allergen Reactions    Prempro [Conj Estrog-Medroxyprogest Ace] Other (comments)     Benign Right breast mass. Social History     Socioeconomic History    Marital status:    Tobacco Use    Smoking status: Former Smoker     Packs/day: 0.50     Years: 36.00     Pack years: 18.00     Types: Cigarettes     Quit date: 1985     Years since quittin.8    Smokeless tobacco: Never Used   Vaping Use    Vaping Use: Never used   Substance and Sexual Activity    Alcohol use: Yes     Alcohol/week: 2.0 standard drinks     Types: 1 Glasses of wine, 1 Cans of beer per week     Comment: Occasional    Drug use: No    Sexual activity: Yes     Partners: Male     Birth control/protection: None     Comment: post MP      Family History   Problem Relation Age of Onset    Other Mother         gallstones    Breast Cancer Mother 62        unilateral.    Hypertension Father     Stroke Father 70        x2 CVAs and several TIAs    Kidney Disease Father     Asthma Paternal Uncle         x 2    Stroke Paternal Aunt     Diabetes Paternal Aunt     Heart Attack Paternal Aunt         multiple    Other Paternal Aunt         gout    Diabetes Paternal Aunt     Colon Cancer Maternal Grandmother 79    Uterine Cancer Maternal Grandmother         uterine    Cancer Maternal Aunt         lung    Cancer Other         maternal cousins (breast)    Breast Cancer Other 35        x 2.  bilaterally.  Alzheimer's Disease Maternal Aunt         x 2    Other Paternal Grandmother         kyphosis due to dementia posturing    Dementia Paternal Grandmother     Cancer Maternal Grandfather         ?     Other Paternal Grandfather         MVA    Heart Attack Maternal Uncle 75      Past Surgical History:   Procedure Laterality Date    COLONOSCOPY  09/19/2016    Dr. Dane Moore. due q 10 yrs.  HX BREAST LUMPECTOMY Right 1999    Benign. Dr. Minor Brandt HX ORTHOPAEDIC Right 10/2012    Right arm. BENIGN TUMOR REMOVED. Dr. Rocky Pearson      Past Medical History:   Diagnosis Date    Allergic rhinitis, cause unspecified     Arthritis     fingers and toes    Breast mass 06/1999    Right. Benign. due to Prempro. Dr. Yfn Nolan    Chickenpox childhood    Dizziness 10/2011    Dr. Ravindra Yang.  EBV positive mononucleosis syndrome 10/2010    positive titer.  Exposure to hepatitis B         Genital HSV     Head injury, closed, with concussion 10/2018    due to hitting head on trunk of vehicle. Dr. Meredith Sanabria. Txd w PT.    Heart palpitations 05/21/09, 01/2018    Dr. Marita Wilkins    Hypertension     Hypoglycemia     Menopausal and postmenopausal disorder 1999    Migraine 1981    MRSA infection 12/2011    Right hip. Txd Bactrim.  Osteopenia 03/2019    Dr. Casandra Perales. Dr. Fernando Champagne. Dr. Reis Starch 03/2019    Other and unspecified hyperlipidemia     Raynaud phenomenon     RLS (restless legs syndrome) 2008    Shingles teenager    R arm    Vitamin D deficiency 2008      Current Outpatient Medications   Medication Sig Dispense Refill    simvastatin (ZOCOR) 40 mg tablet TAKE 1 TABLET BY MOUTH EVERY DAY AT NIGHT 90 Tablet 0    lisinopriL (PRINIVIL, ZESTRIL) 20 mg tablet TAKE 1 TABLET BY MOUTH EVERY DAY 90 Tablet 0    methocarbamoL (ROBAXIN) 750 mg tablet TAKE 1 TABLET BY MOUTH 2 TIMES A DAY AS NEEDED FOR MUSCLE SPASMS FOR UP TO 10 DAYS      montelukast (SINGULAIR) 10 mg tablet TAKE 1 TABLET BY MOUTH EVERY DAY.  90 Tab 2    meloxicam (MOBIC) 7.5 mg tablet TAKE 2 TABLETS BY MOUTH EVERY DAY (Patient taking differently: 7.5 mg as needed.) 60 Tab 2    cetirizine-pseudoePHEDrine (ZYRTEC-D) 5-120 mg Tb12 Take 1 Tablet by mouth as needed.  cholecalciferol (VITAMIN D3) 25 mcg (1,000 unit) cap Take  by mouth daily.  valACYclovir (VALTREX) 500 mg tablet TAKE 1 TABLET BY MOUTH EVERY DAY DURING FLARES 90 Tab 1    acetaminophen (TYLENOL EXTRA STRENGTH) 500 mg tablet Take  by mouth every six (6) hours as needed for Pain.  aspirin delayed-release 81 mg tablet Take 1 Tab by mouth daily. 90 Tab 3    fluticasone (FLONASE) 50 mcg/actuation nasal spray 2 Sprays by Both Nostrils route daily. 1 Bottle 0    omega-3 fatty acids-vitamin e (FISH OIL) 1,000 mg Cap Take  by mouth daily.  MULTIVITAMINS (MULTIVITAMIN PO) Take  by mouth. Takes 3 times/week      ZINC PO Take  by mouth. Pt states, C plus Zinc (Patient not taking: Reported on 11/5/2021)      Blood Pressure Test Kit-Wrist kit Check bp daily and as needed Dx:  Hypertension I10  Indications: hypertension (Patient not taking: Reported on 11/5/2021) 1 Kit 0        Assessment/ Plan:   Diagnoses and all orders for this visit:    1. Essential hypertension with goal blood pressure less than 140/90  -    Well controlled. Continue  lisinopriL (PRINIVIL, ZESTRIL) 20 mg tablet; Take 1 Tablet by mouth daily. 2. Genital herpes simplex, unspecified site  -     valACYclovir (VALTREX) 500 mg tablet; TAKE 1 TABLET BY MOUTH EVERY DAY    3. Hyperlipidemia, unspecified hyperlipidemia type  -    Well controlled with  simvastatin (ZOCOR) 40 mg tablet; Take 1 Tablet by mouth nightly. Medication risks/benefits/costs/interactions/alternatives discussed with patient. Advised patient to call back or return to office if symptoms worsen/change/persist. If patient cannot reach us or should anything more severe/urgent arise he/she should proceed directly to the nearest emergency department. Discussed expected course/resolution/complications of diagnosis in detail with patient.   Patient given a written after visit summary which includes her diagnoses, current medications and vitals. Patient expressed understanding with the diagnosis and plan. Follow-up and Dispositions    · Return in about 6 months (around 5/5/2022), or if symptoms worsen or fail to improve, for cholesterol, Hypertension.

## 2021-11-07 RX ORDER — SIMVASTATIN 40 MG/1
40 TABLET, FILM COATED ORAL
Qty: 90 TABLET | Refills: 0 | Status: SHIPPED | OUTPATIENT
Start: 2021-11-07 | End: 2022-04-11 | Stop reason: SDUPTHER

## 2021-11-07 RX ORDER — LISINOPRIL 20 MG/1
20 TABLET ORAL DAILY
Qty: 90 TABLET | Refills: 0 | Status: SHIPPED | OUTPATIENT
Start: 2021-11-07 | End: 2022-05-13 | Stop reason: SDUPTHER

## 2022-01-19 ENCOUNTER — OFFICE VISIT (OUTPATIENT)
Dept: PRIMARY CARE CLINIC | Age: 67
End: 2022-01-19
Payer: COMMERCIAL

## 2022-01-19 ENCOUNTER — HOSPITAL ENCOUNTER (OUTPATIENT)
Dept: GENERAL RADIOLOGY | Age: 67
Discharge: HOME | End: 2022-01-19
Attending: FAMILY MEDICINE
Payer: COMMERCIAL

## 2022-01-19 VITALS
TEMPERATURE: 98.4 F | HEIGHT: 66 IN | RESPIRATION RATE: 16 BRPM | DIASTOLIC BLOOD PRESSURE: 75 MMHG | WEIGHT: 106.4 LBS | BODY MASS INDEX: 17.1 KG/M2 | HEART RATE: 71 BPM | OXYGEN SATURATION: 98 % | SYSTOLIC BLOOD PRESSURE: 129 MMHG

## 2022-01-19 DIAGNOSIS — M25.511 ACUTE PAIN OF RIGHT SHOULDER: Primary | ICD-10-CM

## 2022-01-19 DIAGNOSIS — M25.561 ACUTE PAIN OF RIGHT KNEE: ICD-10-CM

## 2022-01-19 DIAGNOSIS — M25.511 ACUTE PAIN OF RIGHT SHOULDER: ICD-10-CM

## 2022-01-19 DIAGNOSIS — M85.80 OSTEOPENIA, UNSPECIFIED LOCATION: ICD-10-CM

## 2022-01-19 DIAGNOSIS — Z78.0 POSTMENOPAUSAL: ICD-10-CM

## 2022-01-19 PROCEDURE — 73562 X-RAY EXAM OF KNEE 3: CPT

## 2022-01-19 PROCEDURE — 99214 OFFICE O/P EST MOD 30 MIN: CPT | Performed by: FAMILY MEDICINE

## 2022-01-19 PROCEDURE — 73030 X-RAY EXAM OF SHOULDER: CPT

## 2022-01-19 RX ORDER — NAPROXEN 500 MG/1
500 TABLET ORAL 2 TIMES DAILY WITH MEALS
Qty: 30 TABLET | Refills: 0 | Status: SHIPPED | OUTPATIENT
Start: 2022-01-19 | End: 2022-02-01

## 2022-01-19 NOTE — PROGRESS NOTES
Subjective:     Chief Complaint   Patient presents with    Joint Pain      R knee, denies injury     Shoulder Pain     R shoulder  ever since flu shot     Foot Pain     L foot pain         She  is a 77y.o. year old female female with hx of hypertension, dyslipidemia, vit d deficiency, osteopenia, and genital herpes simplex presents today with a complain of pain in her right shoulder, right knee X 2 months and left foot pain noticed today. Standing on her right leg sometimes hurts her knee. No selling, no injury. Today she notice some pain in her lateral side of the left foot when she walks on it. No injury. Right shoulder hurts with certain ROM especially above head movement. Laying on that side hurts. Did not take any med. Hx of osteopenia. DEXA Results (most recent):  Results from Hospital Encounter encounter on 08/21/18    DEXA BONE DENSITY STUDY AXIAL    Narrative  Bone Mineral Density    Indication:  Osteopenia follow-up  Age: 58  Sex: Female. Menopause status: postmenopausal.  Hormone replacement therapy: No    Number of falls in the past year:   None. Risk factors for osteoporosis:  None. Current medication for osteoporosis: Calcium and vitamin D. Comparison: 2014    Technique: Imaging was performed on the [employer].  World Health Organization  meta-analysis fracture risk calculator (FRAX) analysis was performed for 10 year  fracture risk probability assessment    Excluded sites: None    Findings:      Femoral Neck Left:  Bone mineral density (gm/cm2):  0.817  % of peak bone mass:  79  % for age matched controls:  80  T-score:  -1.6  Z-score:  -0.8    Femoral Neck Right:  Bone mineral density (gm/cm2):  0.818  % of peak bone mass:  79  % for age matched controls:  80  T-score:  -1.6  Z-score:  -0.8    Total Hip Left:  Bone mineral density (gm/cm2):  0.777  % of peak bone mass:  77  % for age matched controls:  80  T-score:  -1.8  Z-score:  -1.3    Total Hip Right:  Bone mineral density (gm/cm2):  0.812  % of peak bone mass:  81  % for age matched controls:  80  T-score:  -1.6  Z-score:  -1.1    Lumbar Spine:  L1-L4  Bone mineral density (gm/cm2):  1.144  % of peak bone mass:  96  % for age matched controls:  110  T-score:  -0.4  Z-score:  0.9    Impression  Impression: This patient is osteopenic using the World Health Organization criteria  A direct comparison to the last exam cannot be made due to differences in  machinery. 10 year probability of major osteoporotic fracture:  7.1%  10 year probability of hip fracture:  0.8%    Recommendations:  Therapy recommendations need to be tailored to each individual patient. Using  the [facility]) FRAX absolute fracture algorithm, the  [address] [place] [address] recommends beginning pharmacological therapy in  postmenopausal women and men over the age of 48 with a 8 year probability of a  hip fracture of >3% OR with the 10 year probability of a major osteoporotic  fracture of >20%. Please reconsider testing based on risk factors. Currently, Medicare will only  reimburse for a central DXA examination every two years, unless the patient is  on chronic glucocorticoid therapy. Note: Please note that reliable, valid comparisons cannot be made between  studies which have been performed on machines from different manufacturers. If  clinically warranted, a follow up study performed at this site, on the same  unit, would allow the most sensitive assessment of change in bone mineral  density. Pertinent items are noted in HPI. Objective:     Vitals:    01/19/22 1144   BP: 129/75   Pulse: 71   Resp: 16   Temp: 98.4 °F (36.9 °C)   TempSrc: Temporal   SpO2: 98%   Weight: 106 lb 6.4 oz (48.3 kg)   Height: 5' 6\" (1.676 m)       Physical Examination: General appearance - alert, well appearing, and in no distress, oriented to person, place, and time and errol appearance.   Mental status - alert, oriented to person, place, and time, normal mood, behavior, speech, dress, motor activity, and thought processes  Musculoskeletal - Knee exam:  Right knee: No erythema, edema, or bruising. Joint line tenderness  medially. No laxity to cruiciate or collateral ligaments. No menisceal sign medial or lateral aspect. Grinding noise noted with flexion and extension. Left knee: No erythema, edema, or bruising. No joint line tenderness laterally or medially. No laxity to cruiciate or collateral ligaments. No menisceal sign medial or lateral aspect. Left foot: no swelling or any abnormality. Mild TTP over lateral side of the MTP joint of 5 th toe. Right shoulder: TTP over anterior shoulder. ROM is slight restricted. Empty can test is negative. Normal strength. Extremities - no pedal edema noted, feet normal, good pulses, normal color, temperature and sensation    Allergies   Allergen Reactions    Prempro [Conj Estrog-Medroxyprogest Ace] Other (comments)     Benign Right breast mass. Social History     Socioeconomic History    Marital status:    Tobacco Use    Smoking status: Former Smoker     Packs/day: 0.50     Years: 36.00     Pack years: 18.00     Types: Cigarettes     Quit date: 1985     Years since quittin.0    Smokeless tobacco: Never Used   Vaping Use    Vaping Use: Never used   Substance and Sexual Activity    Alcohol use:  Yes     Alcohol/week: 2.0 standard drinks     Types: 1 Glasses of wine, 1 Cans of beer per week     Comment: Occasional    Drug use: No    Sexual activity: Yes     Partners: Male     Birth control/protection: None     Comment: post MP      Family History   Problem Relation Age of Onset    Other Mother         gallstones    Breast Cancer Mother 62        unilateral.    Hypertension Father     Stroke Father 70        x2 CVAs and several TIAs    Kidney Disease Father     Asthma Paternal Uncle         x 2    Stroke Paternal Aunt     Diabetes Paternal Aunt     Heart Attack Paternal Aunt         multiple    Other Paternal Aunt         gout    Diabetes Paternal Aunt     Colon Cancer Maternal Grandmother 79    Uterine Cancer Maternal Grandmother         uterine    Cancer Maternal Aunt         lung    Cancer Other         maternal cousins (breast)    Breast Cancer Other 35        x 2.  bilaterally.  Alzheimer's Disease Maternal Aunt         x 2    Other Paternal Grandmother         kyphosis due to dementia posturing    Dementia Paternal Grandmother     Cancer Maternal Grandfather         ?  Other Paternal Grandfather         MVA    Heart Attack Maternal Uncle 76      Past Surgical History:   Procedure Laterality Date    COLONOSCOPY  09/19/2016    Dr. Johnny Beth. due q 10 yrs.  HX BREAST LUMPECTOMY Right 1999    Benign. Dr. Tao Alt HX ORTHOPAEDIC Right 10/2012    Right arm. BENIGN TUMOR REMOVED. Dr. Johnie Prasad      Past Medical History:   Diagnosis Date    Allergic rhinitis, cause unspecified     Arthritis     fingers and toes    Breast mass 06/1999    Right. Benign. due to Prempro. Dr. Eduardo Burton    Chickenpox childhood    Dizziness 10/2011    Dr. Rocky Mcleod.  EBV positive mononucleosis syndrome 10/2010    positive titer.  Exposure to hepatitis B         Genital HSV     Head injury, closed, with concussion 10/2018    due to hitting head on trunk of vehicle. Dr. Suad Waggoner. Txd w PT.    Heart palpitations 05/21/09, 01/2018    Dr. Slime Kirkpatrick    Hypertension     Hypoglycemia     Menopausal and postmenopausal disorder 1999    Migraine 1981    MRSA infection 12/2011    Right hip. Txd Bactrim.  Osteopenia 03/2019    Dr. Fani Richard. Dr. Calle.   Dr. Madison Bates 03/2019    Other and unspecified hyperlipidemia     Raynaud phenomenon     RLS (restless legs syndrome) 2008    Shingles teenager    R arm    Vitamin D deficiency 2008      Current Outpatient Medications   Medication Sig Dispense Refill    montelukast (SINGULAIR) 10 mg tablet TAKE 1 TABLET BY MOUTH EVERY DAY 30 Tablet 0    simvastatin (ZOCOR) 40 mg tablet Take 1 Tablet by mouth nightly. 90 Tablet 0    lisinopriL (PRINIVIL, ZESTRIL) 20 mg tablet Take 1 Tablet by mouth daily. 90 Tablet 0    valACYclovir (VALTREX) 500 mg tablet TAKE 1 TABLET BY MOUTH EVERY DAY 90 Tablet 1    methocarbamoL (ROBAXIN) 750 mg tablet TAKE 1 TABLET BY MOUTH 2 TIMES A DAY AS NEEDED FOR MUSCLE SPASMS FOR UP TO 10 DAYS      meloxicam (MOBIC) 7.5 mg tablet TAKE 2 TABLETS BY MOUTH EVERY DAY (Patient taking differently: 7.5 mg as needed.) 60 Tab 2    cetirizine-pseudoePHEDrine (ZYRTEC-D) 5-120 mg Tb12 Take 1 Tablet by mouth as needed.  cholecalciferol (VITAMIN D3) 25 mcg (1,000 unit) cap Take  by mouth daily.  acetaminophen (TYLENOL EXTRA STRENGTH) 500 mg tablet Take  by mouth every six (6) hours as needed for Pain.  aspirin delayed-release 81 mg tablet Take 1 Tab by mouth daily. 90 Tab 3    fluticasone (FLONASE) 50 mcg/actuation nasal spray 2 Sprays by Both Nostrils route daily. 1 Bottle 0    omega-3 fatty acids-vitamin e (FISH OIL) 1,000 mg Cap Take  by mouth daily.  MULTIVITAMINS (MULTIVITAMIN PO) Take  by mouth. Takes 3 times/week      ZINC PO Take  by mouth. Pt states, C plus Zinc (Patient not taking: Reported on 11/5/2021)      Blood Pressure Test Kit-Wrist kit Check bp daily and as needed Dx:  Hypertension I10  Indications: hypertension (Patient not taking: Reported on 11/5/2021) 1 Kit 0        Assessment/ Plan:   Diagnoses and all orders for this visit:    1. Acute pain of right shoulder  -     XR SHOULDER RT AP/LAT MIN 2 V; Future-  Possible Hill-Sachs deformity. Result discussed with patient. Follow up with Ortho.   -     naproxen (NAPROSYN) 500 mg tablet; Take 1 Tablet by mouth two (2) times daily (with meals). -     REFERRAL TO ORTHOPEDIC SURGERY    2. Acute pain of right knee  -     XR KNEE RT 3 V; Future- Small right joint effusion. Result discussed.  Knee brace, cold compression.   -     naproxen (NAPROSYN) 500 mg tablet; Take 1 Tablet by mouth two (2) times daily (with meals). 3. Osteopenia, unspecified location  -     DEXA BONE DENSITY STUDY AXIAL; Future    4. Postmenopausal  -     DEXA BONE DENSITY STUDY AXIAL; Future           Medication risks/benefits/costs/interactions/alternatives discussed with patient. Advised patient to call back or return to office if symptoms worsen/change/persist. If patient cannot reach us or should anything more severe/urgent arise he/she should proceed directly to the nearest emergency department. Discussed expected course/resolution/complications of diagnosis in detail with patient. Patient given a written after visit summary which includes her diagnoses, current medications and vitals. Patient expressed understanding with the diagnosis and plan. Follow-up and Dispositions    · Return if symptoms worsen or fail to improve.

## 2022-01-19 NOTE — PROGRESS NOTES
Identified pt with two pt identifiers(name and ). Chief Complaint   Patient presents with    Joint Pain      R knee, denies injury     Shoulder Pain     R shoulder  ever since flu shot     Foot Pain     L foot pain         3 most recent PHQ Screens 2022   Little interest or pleasure in doing things Not at all   Feeling down, depressed, irritable, or hopeless Not at all   Total Score PHQ 2 0        Vitals:    22 1144   BP: 129/75   Pulse: 71   Resp: 16   Temp: 98.4 °F (36.9 °C)   TempSrc: Temporal   SpO2: 98%   Weight: 106 lb 6.4 oz (48.3 kg)   Height: 5' 6\" (1.676 m)       Health Maintenance Due   Topic    Breast Cancer Screen Mammogram     Shingrix Vaccine Age 50> (2 of 2)       1. Have you been to the ER, urgent care clinic since your last visit? Hospitalized since your last visit? No    2. Have you seen or consulted any other health care providers outside of the [address] [place] [address] since your last visit? Include any pap smears or colon screening.  No

## 2022-01-27 ENCOUNTER — HOSPITAL ENCOUNTER (OUTPATIENT)
Dept: BONE DENSITY | Age: 67
Discharge: HOME | End: 2022-01-27
Attending: FAMILY MEDICINE
Payer: COMMERCIAL

## 2022-01-27 DIAGNOSIS — M85.80 OSTEOPENIA, UNSPECIFIED LOCATION: ICD-10-CM

## 2022-01-27 DIAGNOSIS — Z78.0 POSTMENOPAUSAL: ICD-10-CM

## 2022-01-27 PROCEDURE — 77080 DXA BONE DENSITY AXIAL: CPT

## 2022-01-31 NOTE — PROGRESS NOTES
Please call patient:    Recent bone density scan revealed osteopenia( also known as low bone mass ) slightly worsen than last time. Osteopenia is a medical term for bone density that is lower than normal but not as low as gets with osteoporosis. A person with osteopenia does not yet have osteoporosis but have increase chance of osteoporosis and  has greater risk of break a bone. Take Vit d supplement 1,000 iu daily and start taking calcium 1,200 mg daily. F/U DEXA scan in two years. We may consider starting Bisphosphonate if needed depending on next result.

## 2022-02-01 ENCOUNTER — TELEPHONE (OUTPATIENT)
Dept: PRIMARY CARE CLINIC | Age: 67
End: 2022-02-01

## 2022-02-01 ENCOUNTER — OFFICE VISIT (OUTPATIENT)
Dept: ORTHOPEDIC SURGERY | Age: 67
End: 2022-02-01
Payer: COMMERCIAL

## 2022-02-01 VITALS
WEIGHT: 108 LBS | OXYGEN SATURATION: 99 % | BODY MASS INDEX: 17.36 KG/M2 | HEIGHT: 66 IN | HEART RATE: 75 BPM | TEMPERATURE: 97.5 F | DIASTOLIC BLOOD PRESSURE: 89 MMHG | SYSTOLIC BLOOD PRESSURE: 149 MMHG

## 2022-02-01 DIAGNOSIS — M75.41 IMPINGEMENT SYNDROME OF RIGHT SHOULDER: Primary | ICD-10-CM

## 2022-02-01 DIAGNOSIS — M17.11 UNILATERAL PRIMARY OSTEOARTHRITIS, RIGHT KNEE: ICD-10-CM

## 2022-02-01 PROCEDURE — 20610 DRAIN/INJ JOINT/BURSA W/O US: CPT | Performed by: ORTHOPAEDIC SURGERY

## 2022-02-01 PROCEDURE — 99204 OFFICE O/P NEW MOD 45 MIN: CPT | Performed by: ORTHOPAEDIC SURGERY

## 2022-02-01 RX ORDER — BETAMETHASONE SODIUM PHOSPHATE AND BETAMETHASONE ACETATE 3; 3 MG/ML; MG/ML
6 INJECTION, SUSPENSION INTRA-ARTICULAR; INTRALESIONAL; INTRAMUSCULAR; SOFT TISSUE ONCE
Status: COMPLETED | OUTPATIENT
Start: 2022-02-01 | End: 2022-02-01

## 2022-02-01 RX ORDER — DICLOFENAC SODIUM 75 MG/1
75 TABLET, DELAYED RELEASE ORAL 2 TIMES DAILY
Qty: 60 TABLET | Refills: 0 | Status: SHIPPED | OUTPATIENT
Start: 2022-02-01 | End: 2022-08-23 | Stop reason: ALTCHOICE

## 2022-02-01 RX ADMIN — BETAMETHASONE SODIUM PHOSPHATE AND BETAMETHASONE ACETATE 6 MG: 3; 3 INJECTION, SUSPENSION INTRA-ARTICULAR; INTRALESIONAL; INTRAMUSCULAR; SOFT TISSUE at 10:26

## 2022-02-01 NOTE — TELEPHONE ENCOUNTER
----- Message from Sayra Mohr MD sent at 1/30/2022  9:15 PM EST -----  Please call patient:    Recent bone density scan revealed osteopenia( also known as low bone mass ) slightly worsen than last time. Osteopenia is a medical term for bone density that is lower than normal but not as low as gets with osteoporosis. A person with osteopenia does not yet have osteoporosis but have increase chance of osteoporosis and  has greater risk of break a bone. Take Vit d supplement 1,000 iu daily and start taking calcium 1,200 mg daily. F/U DEXA scan in two years. We may consider starting Bisphosphonate if needed depending on next result.

## 2022-02-01 NOTE — PROGRESS NOTES
Identified pt with two pt identifiers (name and ). Reviewed chart in preparation for visit and have obtained necessary documentation. Kelsi Jimenez is a 77 y.o. female  Chief Complaint   Patient presents with    Shoulder Pain     RT shoulder     Visit Vitals  BP (!) 149/89 (BP 1 Location: Right arm, BP Patient Position: Sitting, BP Cuff Size: Adult)   Pulse 75   Temp 97.5 °F (36.4 °C) (Tympanic)   Ht 5' 6\" (1.676 m)   Wt 108 lb (49 kg)   SpO2 99%   BMI 17.43 kg/m²     1. Have you been to the ER, urgent care clinic since your last visit? Hospitalized since your last visit? No    2. Have you seen or consulted any other health care providers outside of the [address] [place] [address] since your last visit? Include any pap smears or colon screening.  No

## 2022-02-01 NOTE — LETTER
2/1/2022    Patient: Braulio Beckett   YOB: 1955   Date of Visit: 2/1/2022     Cha Gonzalez MD  [address] [place] [address] 65      Dear Cha Gonzalez MD,      Thank you for referring Ms. Noel Gracia to [facility] for evaluation. My notes for this consultation are attached. If you have questions, please do not hesitate to call me. I look forward to following your patient along with you.       Sincerely,    Vielka Hurtado, DO
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
## 2022-02-07 DIAGNOSIS — J30.89 ENVIRONMENTAL AND SEASONAL ALLERGIES: ICD-10-CM

## 2022-02-07 RX ORDER — MONTELUKAST SODIUM 10 MG/1
10 TABLET ORAL DAILY
Qty: 30 TABLET | Refills: 3 | Status: SHIPPED | OUTPATIENT
Start: 2022-02-07 | End: 2022-05-09 | Stop reason: SDUPTHER

## 2022-02-09 ENCOUNTER — VIRTUAL VISIT (OUTPATIENT)
Dept: ORTHOPEDIC SURGERY | Age: 67
End: 2022-02-09
Payer: COMMERCIAL

## 2022-02-09 DIAGNOSIS — M75.41 IMPINGEMENT SYNDROME OF RIGHT SHOULDER: Primary | ICD-10-CM

## 2022-02-09 DIAGNOSIS — M17.11 UNILATERAL PRIMARY OSTEOARTHRITIS, RIGHT KNEE: ICD-10-CM

## 2022-02-09 PROCEDURE — 99441 PR PHYS/QHP TELEPHONE EVALUATION 5-10 MIN: CPT | Performed by: ORTHOPAEDIC SURGERY
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
## 2022-03-18 PROBLEM — Z84.1 FAMILY HISTORY OF KIDNEY DISEASE: Status: ACTIVE | Noted: 2017-03-28

## 2022-03-18 PROBLEM — Z82.3 FAMILY HISTORY OF STROKE: Status: ACTIVE | Noted: 2017-03-28

## 2022-03-19 PROBLEM — Z80.0 FAMILY HISTORY OF COLON CANCER: Status: ACTIVE | Noted: 2017-03-28

## 2022-03-19 PROBLEM — Z86.16 HISTORY OF COVID-19: Status: ACTIVE | Noted: 2021-03-29

## 2022-03-19 PROBLEM — S06.0XAA HEAD INJURY, CLOSED, WITH CONCUSSION: Status: ACTIVE | Noted: 2018-10-01

## 2022-03-19 PROBLEM — R63.6 UNDERWEIGHT: Status: ACTIVE | Noted: 2017-03-28

## 2022-03-19 PROBLEM — Z82.49 FAMILY HISTORY OF HEART ATTACK: Status: ACTIVE | Noted: 2017-03-28

## 2022-03-20 PROBLEM — Z80.3 FAMILY HISTORY OF BREAST CANCER: Status: ACTIVE | Noted: 2017-03-28

## 2022-03-20 PROBLEM — Z82.0 FAMILY HISTORY OF ALZHEIMER'S DISEASE: Status: ACTIVE | Noted: 2017-03-28

## 2022-03-20 PROBLEM — E55.9 VITAMIN D DEFICIENCY: Status: ACTIVE | Noted: 2018-01-16

## 2022-04-11 ENCOUNTER — OFFICE VISIT (OUTPATIENT)
Dept: URGENT CARE | Age: 67
End: 2022-04-11
Payer: COMMERCIAL

## 2022-04-11 VITALS
TEMPERATURE: 98.6 F | BODY MASS INDEX: 17.19 KG/M2 | WEIGHT: 107 LBS | RESPIRATION RATE: 16 BRPM | HEIGHT: 66 IN | SYSTOLIC BLOOD PRESSURE: 124 MMHG | OXYGEN SATURATION: 99 % | DIASTOLIC BLOOD PRESSURE: 83 MMHG | HEART RATE: 92 BPM

## 2022-04-11 DIAGNOSIS — R05.9 COUGH: ICD-10-CM

## 2022-04-11 DIAGNOSIS — Z11.59 SCREENING FOR VIRAL DISEASE: ICD-10-CM

## 2022-04-11 DIAGNOSIS — E78.5 HYPERLIPIDEMIA, UNSPECIFIED HYPERLIPIDEMIA TYPE: ICD-10-CM

## 2022-04-11 DIAGNOSIS — J02.9 SORE THROAT: ICD-10-CM

## 2022-04-11 DIAGNOSIS — J06.9 VIRAL URI: Primary | ICD-10-CM

## 2022-04-11 LAB
FLUAV+FLUBV AG NOSE QL IA.RAPID: NEGATIVE
FLUAV+FLUBV AG NOSE QL IA.RAPID: NEGATIVE
S PYO AG THROAT QL: NEGATIVE
SARS-COV-2 PCR, POC: NEGATIVE
VALID INTERNAL CONTROL?: YES
VALID INTERNAL CONTROL?: YES

## 2022-04-11 PROCEDURE — 87635 SARS-COV-2 COVID-19 AMP PRB: CPT | Performed by: FAMILY MEDICINE

## 2022-04-11 PROCEDURE — 99213 OFFICE O/P EST LOW 20 MIN: CPT | Performed by: FAMILY MEDICINE

## 2022-04-11 PROCEDURE — 87880 STREP A ASSAY W/OPTIC: CPT | Performed by: FAMILY MEDICINE

## 2022-04-11 PROCEDURE — 87804 INFLUENZA ASSAY W/OPTIC: CPT | Performed by: FAMILY MEDICINE

## 2022-04-11 RX ORDER — SIMVASTATIN 40 MG/1
40 TABLET, FILM COATED ORAL
Qty: 90 TABLET | Refills: 0 | Status: SHIPPED | OUTPATIENT
Start: 2022-04-11 | End: 2022-07-09
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
## 2022-04-11 NOTE — PATIENT INSTRUCTIONS
OTC mucinex as needed     Upper Respiratory Infection (Cold): Care Instructions  Your Care Instructions     An upper respiratory infection, or URI, is an infection of the nose, sinuses, or throat. URIs are spread by coughs, sneezes, and direct contact. The common cold is the most frequent kind of URI. The flu and sinus infections are other kinds of URIs. Almost all URIs are caused by viruses. Antibiotics won't cure them. But you can treat most infections with home care. This may include drinking lots of fluids and taking over-the-counter pain medicine. You will probably feel better in 4 to 10 days. The doctor has checked you carefully, but problems can develop later. If you notice any problems or new symptoms, get medical treatment right away. Follow-up care is a key part of your treatment and safety. Be sure to make and go to all appointments, and call your doctor if you are having problems. It's also a good idea to know your test results and keep a list of the medicines you take. How can you care for yourself at home? · To prevent dehydration, drink plenty of fluids. Choose water and other clear liquids until you feel better. If you have kidney, heart, or liver disease and have to limit fluids, talk with your doctor before you increase the amount of fluids you drink. · Take an over-the-counter pain medicine, such as acetaminophen (Tylenol), ibuprofen (Advil, Motrin), or naproxen (Aleve). Read and follow all instructions on the label. · Before you use cough and cold medicines, check the label. These medicines may not be safe for young children or for people with certain health problems. · Be careful when taking over-the-counter cold or flu medicines and Tylenol at the same time. Many of these medicines have acetaminophen, which is Tylenol. Read the labels to make sure that you are not taking more than the recommended dose. Too much acetaminophen (Tylenol) can be harmful.   · Get plenty of rest.  · Do not smoke or allow others to smoke around you. If you need help quitting, talk to your doctor about stop-smoking programs and medicines. These can increase your chances of quitting for good. When should you call for help? Call 911 anytime you think you may need emergency care. For example, call if:    · You have severe trouble breathing. Call your doctor now or seek immediate medical care if:    · You seem to be getting much sicker.     · You have new or worse trouble breathing.     · You have a new or higher fever.     · You have a new rash. Watch closely for changes in your health, and be sure to contact your doctor if:    · You have a new symptom, such as a sore throat, an earache, or sinus pain.     · You cough more deeply or more often, especially if you notice more mucus or a change in the color of your mucus.     · You do not get better as expected. Where can you learn more? Go to http://www.gray.com/  Enter K520 in the search box to learn more about \"Upper Respiratory Infection (Cold): Care Instructions. \"  Current as of: July 6, 2021               Content Version: 13.2  © 4257-0851 [employer]. Care instructions adapted under license by [employer] (which disclaims liability or warranty for this information). If you have questions about a medical condition or this instruction, always ask your healthcare professional. [address] any warranty or liability for your use of this information.

## 2022-04-15 ENCOUNTER — OFFICE VISIT (OUTPATIENT)
Dept: URGENT CARE | Age: 67
End: 2022-04-15
Payer: COMMERCIAL

## 2022-04-15 VITALS
TEMPERATURE: 98.1 F | HEART RATE: 68 BPM | DIASTOLIC BLOOD PRESSURE: 72 MMHG | WEIGHT: 104 LBS | BODY MASS INDEX: 16.71 KG/M2 | HEIGHT: 66 IN | RESPIRATION RATE: 18 BRPM | SYSTOLIC BLOOD PRESSURE: 129 MMHG | OXYGEN SATURATION: 99 %

## 2022-04-15 DIAGNOSIS — J01.40 ACUTE NON-RECURRENT PANSINUSITIS: Primary | ICD-10-CM

## 2022-04-15 LAB — BACTERIA SPEC RESP CULT: NORMAL

## 2022-04-15 PROCEDURE — 99213 OFFICE O/P EST LOW 20 MIN: CPT | Performed by: FAMILY MEDICINE

## 2022-04-15 RX ORDER — BENZONATATE 200 MG/1
200 CAPSULE ORAL
Qty: 30 CAPSULE | Refills: 0 | Status: SHIPPED | OUTPATIENT
Start: 2022-04-15 | End: 2022-08-23 | Stop reason: ALTCHOICE

## 2022-04-15 RX ORDER — GUAIFENESIN 1200 MG/1
1200 TABLET, EXTENDED RELEASE ORAL 2 TIMES DAILY
COMMUNITY

## 2022-04-15 RX ORDER — AZITHROMYCIN 250 MG/1
TABLET, FILM COATED ORAL
Qty: 6 TABLET | Refills: 0 | Status: SHIPPED | OUTPATIENT
Start: 2022-04-15 | End: 2022-08-23 | Stop reason: ALTCHOICE
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
## 2022-04-15 NOTE — LETTER
NOTIFICATION TO RETURN TO WORK / SCHOOL    04/15/22     [address] [id number]       To Whom It May Concern:    Gabby Dawson was seen today at [facility]. She  will return to work on 4/18/2022. If there are questions or concerns please have the patient contact our office.         Sincerely,      Franca Solitario MD

## 2022-04-15 NOTE — PATIENT INSTRUCTIONS
Sinusitis: Care Instructions  Your Care Instructions     Sinusitis is an infection of the lining of the sinus cavities in your head. Sinusitis often follows a cold. It causes pain and pressure in your head and face. In most cases, sinusitis gets better on its own in 1 to 2 weeks. But some mild symptoms may last for several weeks. Sometimes antibiotics are needed. Follow-up care is a key part of your treatment and safety. Be sure to make and go to all appointments, and call your doctor if you are having problems. It's also a good idea to know your test results and keep a list of the medicines you take. How can you care for yourself at home? · Take an over-the-counter pain medicine, such as acetaminophen (Tylenol), ibuprofen (Advil, Motrin), or naproxen (Aleve). Read and follow all instructions on the label. · If the doctor prescribed antibiotics, take them as directed. Do not stop taking them just because you feel better. You need to take the full course of antibiotics. · Be careful when taking over-the-counter cold or flu medicines and Tylenol at the same time. Many of these medicines have acetaminophen, which is Tylenol. Read the labels to make sure that you are not taking more than the recommended dose. Too much acetaminophen (Tylenol) can be harmful. · Breathe warm, moist air from a steamy shower, a hot bath, or a sink filled with hot water. Avoid cold, dry air. Using a humidifier in your home may help. Follow the directions for cleaning the machine. · Use saline (saltwater) nasal washes. This can help keep your nasal passages open and wash out mucus and bacteria. You can buy saline nose drops at a grocery store or drugstore. Or you can make your own at home by adding 1 teaspoon of salt and 1 teaspoon of baking soda to 2 cups of distilled water. If you make your own, fill a bulb syringe with the solution, insert the tip into your nostril, and squeeze gently. Ameena Pont your nose.   · Put a hot, wet towel or a warm gel pack on your face 3 or 4 times a day for 5 to 10 minutes each time. · Try a decongestant nasal spray like oxymetazoline (Afrin). Do not use it for more than 3 days in a row. Using it for more than 3 days can make your congestion worse. When should you call for help? Call your doctor now or seek immediate medical care if:    · You have new or worse swelling or redness in your face or around your eyes.     · You have a new or higher fever. Watch closely for changes in your health, and be sure to contact your doctor if:    · You have new or worse facial pain.     · The mucus from your nose becomes thicker (like pus) or has new blood in it.     · You are not getting better as expected. Where can you learn more? Go to http://www.gray.com/  Enter A260 in the search box to learn more about \"Sinusitis: Care Instructions. \"  Current as of: September 8, 2021               Content Version: 13.2  © 2006-2022 [employer]. Care instructions adapted under license by [employer] (which disclaims liability or warranty for this information). If you have questions about a medical condition or this instruction, always ask your healthcare professional. [place] [address] any warranty or liability for your use of this information.

## 2022-05-09 DIAGNOSIS — A60.00 GENITAL HERPES SIMPLEX, UNSPECIFIED SITE: ICD-10-CM

## 2022-05-09 DIAGNOSIS — J30.89 ENVIRONMENTAL AND SEASONAL ALLERGIES: ICD-10-CM

## 2022-05-09 NOTE — TELEPHONE ENCOUNTER
90 day refill request just for Valacyclovir  mg and 30 day refill request for Montelukast Sodium 10 mg.

## 2022-05-10 RX ORDER — MONTELUKAST SODIUM 10 MG/1
10 TABLET ORAL DAILY
Qty: 30 TABLET | Refills: 3 | Status: SHIPPED | OUTPATIENT
Start: 2022-05-10

## 2022-05-10 RX ORDER — VALACYCLOVIR HYDROCHLORIDE 500 MG/1
TABLET, FILM COATED ORAL
Qty: 90 TABLET | Refills: 1 | Status: SHIPPED | OUTPATIENT
Start: 2022-05-10 | End: 2022-11-04

## 2022-05-10 NOTE — TELEPHONE ENCOUNTER
PCP: Manisha Galloway MD    Last appt: 1/19/2022  Future Appointments   Date Time Provider Epifanio Mtz   8/23/2022 10:00 AM Barrett Kaye MD [facility] [place] BS AMB       Requested Prescriptions     Pending Prescriptions Disp Refills    montelukast (SINGULAIR) 10 mg tablet 30 Tablet 3     Sig: Take 1 Tablet by mouth daily.     valACYclovir (VALTREX) 500 mg tablet 90 Tablet 1     Sig: TAKE 1 TABLET BY MOUTH EVERY DAY         Other Comments:

## 2022-05-13 DIAGNOSIS — I10 ESSENTIAL HYPERTENSION WITH GOAL BLOOD PRESSURE LESS THAN 140/90: ICD-10-CM

## 2022-05-15 RX ORDER — LISINOPRIL 20 MG/1
20 TABLET ORAL DAILY
Qty: 90 TABLET | Refills: 0 | Status: SHIPPED | OUTPATIENT
Start: 2022-05-15 | End: 2022-08-15

## 2022-07-09 DIAGNOSIS — E78.5 HYPERLIPIDEMIA, UNSPECIFIED HYPERLIPIDEMIA TYPE: ICD-10-CM

## 2022-07-09 RX ORDER — SIMVASTATIN 40 MG/1
40 TABLET, FILM COATED ORAL
Qty: 90 TABLET | Refills: 0 | Status: SHIPPED | OUTPATIENT
Start: 2022-07-09 | End: 2022-08-24

## 2022-08-15 DIAGNOSIS — I10 ESSENTIAL HYPERTENSION WITH GOAL BLOOD PRESSURE LESS THAN 140/90: ICD-10-CM

## 2022-08-15 RX ORDER — LISINOPRIL 20 MG/1
TABLET ORAL
Qty: 90 TABLET | Refills: 0 | Status: SHIPPED | OUTPATIENT
Start: 2022-08-15 | End: 2022-08-24

## 2022-08-23 ENCOUNTER — TELEPHONE (OUTPATIENT)
Dept: PRIMARY CARE CLINIC | Age: 67
End: 2022-08-23

## 2022-08-23 ENCOUNTER — OFFICE VISIT (OUTPATIENT)
Dept: PRIMARY CARE CLINIC | Age: 67
End: 2022-08-23
Payer: COMMERCIAL

## 2022-08-23 VITALS
RESPIRATION RATE: 16 BRPM | TEMPERATURE: 98 F | SYSTOLIC BLOOD PRESSURE: 128 MMHG | OXYGEN SATURATION: 99 % | HEART RATE: 55 BPM | HEIGHT: 66 IN | BODY MASS INDEX: 17.16 KG/M2 | DIASTOLIC BLOOD PRESSURE: 72 MMHG | WEIGHT: 106.8 LBS

## 2022-08-23 DIAGNOSIS — M79.601 RIGHT ARM PAIN: ICD-10-CM

## 2022-08-23 DIAGNOSIS — E78.2 MIXED HYPERLIPIDEMIA: ICD-10-CM

## 2022-08-23 DIAGNOSIS — R06.09 DYSPNEA ON EXERTION: ICD-10-CM

## 2022-08-23 DIAGNOSIS — I10 PRIMARY HYPERTENSION: Primary | ICD-10-CM

## 2022-08-23 PROBLEM — R63.6 UNDERWEIGHT: Status: RESOLVED | Noted: 2017-03-28 | Resolved: 2022-08-23

## 2022-08-23 PROBLEM — S06.0XAA HEAD INJURY, CLOSED, WITH CONCUSSION: Status: RESOLVED | Noted: 2018-10-01 | Resolved: 2022-08-23

## 2022-08-23 PROCEDURE — 1123F ACP DISCUSS/DSCN MKR DOCD: CPT | Performed by: INTERNAL MEDICINE

## 2022-08-23 PROCEDURE — 99213 OFFICE O/P EST LOW 20 MIN: CPT | Performed by: INTERNAL MEDICINE

## 2022-08-23 NOTE — PROGRESS NOTES
Chief Complaint   Patient presents with    New Patient     Still having SOB from when she had COVID- 2020       Visit Vitals  BP (!) 149/87 (BP 1 Location: Right arm)   Pulse (!) 55   Temp 98 °F (36.7 °C)   Resp 16   Ht 5' 6\" (1.676 m)   Wt 106 lb 12.8 oz (48.4 kg)   LMP  (LMP Unknown)   SpO2 99%   BMI 17.24 kg/m²       1. Have you been to the ER, urgent care clinic since your last visit? Hospitalized since your last visit? No    2. Have you seen or consulted any other health care providers outside of the [address] [place] [address] since your last visit? Include any pap smears or colon screening.  No

## 2022-08-23 NOTE — PROGRESS NOTES
Fredy Blunt (: 1955) is a 77 y.o. female, new patient, here for evaluation of the following chief complaint(s):  New Patient (Still having SOB from when she had [address] [facility])     Written by Nunu Mcdonough, as dictated by Dr. Camryn Dominique MD.      ASSESSMENT/PLAN:  Below is the assessment and plan developed based on review of pertinent history, physical exam, labs, studies, and medications. 1. Primary hypertension  BP is well-controlled on current medication. No change to dosage at this time. 2. Mixed hyperlipidemia  She notes FMHx of hyperlipidemia. Continue Zocor. No side effects. 3. Right arm pain  Followed by Ortho. 4. Dyspnea on exertion  Referred to Dr. Isak Medrano (Pulmonology). Ordered chest XR.   -     XR CHEST PA LAT; Future  -     REFERRAL TO PULMONARY DISEASE    SUBJECTIVE/OBJECTIVE:      The patient comes in today to establish care. She was previously followed by Dr. Malissa Merlos (PCP). Her BP is elevated today at 149/87, improved to 128/72 on manual repeat. She notes that she woke up with R arm pain. She is on lisinopril 20 mg daily for HTN. She has been on lisinopril for a long time. She was followed by Dr. Rufino Blue (Cardiology) for heart palpitations a while ago, which was normal.     She is on Singulair 10 mg daily for environmental and seasonal allergies. She notes FMHx of hyperlipidemia. She is [facility] Glen Felipe [facility]. She will follow again in . She is c/o SOB when walking. She was positive for COVID in  at an Urgent Care and notes that she was sick for 7-8 days. Her breathing and energy level has not been the same since. Notes that she was a smoker 4 years ago.      Patient Active Problem List   Diagnosis Code    Exposure to hepatitis B Z20.5    Raynaud phenomenon I73.00    Menopausal and postmenopausal disorder N95.9    Osteopenia M85.80    Genital HSV A60.00    RLS (restless legs syndrome) G25.81 Dyslipidemia E78.5    Allergic conjunctivitis and rhinitis H10.10, J30.9    Essential hypertension with goal blood pressure less than 140/90 I10    Family history of Alzheimer's disease Z82.0    Family history of stroke Z82.3    Family history of heart attack Z82.49    Family history of kidney disease Z84.1    Family history of colon cancer Z80.0    Family history of breast cancer Z80.3    Vitamin D deficiency E55.9    Migraine G43.909    History of COVID-19 Z86.16        Current Outpatient Medications on File Prior to Visit   Medication Sig Dispense Refill    lisinopriL (PRINIVIL, ZESTRIL) 20 mg tablet TAKE 1 TABLET BY MOUTH EVERY DAY 90 Tablet 0    simvastatin (ZOCOR) 40 mg tablet TAKE 1 TABLET BY MOUTH NIGHTLY 90 Tablet 0    montelukast (SINGULAIR) 10 mg tablet Take 1 Tablet by mouth daily. 30 Tablet 3    valACYclovir (VALTREX) 500 mg tablet TAKE 1 TABLET BY MOUTH EVERY DAY 90 Tablet 1    guaiFENesin (Mucinex) 1,200 mg Ta12 ER tablet Take 1,200 mg by mouth two (2) times a day. ZINC PO Take  by mouth. Pt states, C plus Zinc       cetirizine-pseudoePHEDrine (ZyrTEC-D) 5-120 mg Tb12 Take 1 Tablet by mouth as needed. cholecalciferol (VITAMIN D3) 25 mcg (1,000 unit) cap Take  by mouth daily. Blood Pressure Test Kit-Wrist kit Check bp daily and as needed Dx:  Hypertension I10  Indications: hypertension 1 Kit 0    aspirin delayed-release 81 mg tablet Take 1 Tab by mouth daily. 90 Tab 3    fluticasone (FLONASE) 50 mcg/actuation nasal spray 2 Sprays by Both Nostrils route daily. 1 Bottle 0    omega-3 fatty acids-vitamin e 1,000 mg cap Take  by mouth daily. MULTIVITAMINS (MULTIVITAMIN PO) Take  by mouth. Takes 3 times/week      [DISCONTINUED] azithromycin (ZITHROMAX) 250 mg tablet Take two tablets today then one tablet daily (Patient not taking: Reported on 8/23/2022) 6 Tablet 0    [DISCONTINUED] benzonatate (TESSALON) 200 mg capsule Take 1 Capsule by mouth three (3) times daily as needed for Cough. (Patient not taking: Reported on 8/23/2022) 30 Capsule 0    [DISCONTINUED] diclofenac EC (VOLTAREN) 75 mg EC tablet Take 1 Tablet by mouth two (2) times a day. (Patient not taking: Reported on 8/23/2022) 60 Tablet 0    [DISCONTINUED] methocarbamoL (ROBAXIN) 750 mg tablet TAKE 1 TABLET BY MOUTH 2 TIMES A DAY AS NEEDED FOR MUSCLE SPASMS FOR UP TO 10 DAYS (Patient not taking: Reported on 8/23/2022)      [DISCONTINUED] acetaminophen (TYLENOL) 500 mg tablet Take  by mouth every six (6) hours as needed for Pain. No current facility-administered medications on file prior to visit. Allergies   Allergen Reactions    Prempro [Conj Estrog-Medroxyprogest Ace] Other (comments)     Benign Right breast mass. Past Medical History:   Diagnosis Date    Allergic rhinitis, cause unspecified     Arthritis     fingers and toes    Breast mass 06/1999    Right. Benign. due to Prempro. Dr. Blas Nelson    Chickenpox childhood    Dizziness 10/2011    Dr. Scar Mcgovern. EBV positive mononucleosis syndrome 10/2010    positive titer. Exposure to hepatitis B         Genital HSV     Head injury, closed, with concussion 10/2018    due to hitting head on trunk of vehicle. Dr. Dora Negron. Txd w PT. Heart palpitations 05/21/09, 01/2018    Dr. Johnathan Trimble    Hypertension     Hypoglycemia     Menopausal and postmenopausal disorder 1999    Migraine 1981    MRSA infection 12/2011    Right hip. Txd Bactrim. Osteopenia 03/2019    Dr. Andrew Hanson. Dr. Lulú Thomas. Dr. Kerry Green 03/2019    Other and unspecified hyperlipidemia     Raynaud phenomenon     RLS (restless legs syndrome) 2008    Shingles teenager    R arm    Vitamin D deficiency 2008       Past Surgical History:   Procedure Laterality Date    COLONOSCOPY  09/19/2016    Dr. Ni Sheikh. due q 10 yrs. HX BREAST LUMPECTOMY Right 1999    Benign. Dr. Verito Reynaga ORTHOPAEDIC Right 10/2012    Right arm. BENIGN TUMOR REMOVED.   Dr. Rama Sterling Family History   Problem Relation Age of Onset    Other Mother         gallstones    Breast Cancer Mother 62        unilateral.    Hypertension Father     Stroke Father 70        x2 CVAs and several TIAs    Kidney Disease Father     Asthma Paternal Uncle         x 2    Stroke Paternal Aunt     Diabetes Paternal Aunt     Heart Attack Paternal Aunt         multiple    Other Paternal Aunt         gout    Diabetes Paternal Aunt     Colon Cancer Maternal Grandmother 79    Uterine Cancer Maternal Grandmother         uterine    Cancer Maternal Aunt         lung    Cancer Other         maternal cousins (breast)    Breast Cancer Other 35        x 2.  bilaterally. Alzheimer's Disease Maternal Aunt         x 2    Other Paternal Grandmother         kyphosis due to dementia posturing    Dementia Paternal Grandmother     Cancer Maternal Grandfather         ? Other Paternal Grandfather         MVA    Heart Attack Maternal Uncle 76       Social History     Socioeconomic History    Marital status:      Spouse name: Not on file    Number of children: Not on file    Years of education: Not on file    Highest education level: Not on file   Occupational History    Not on file   Tobacco Use    Smoking status: Former     Packs/day: 0.50     Years: 36.00     Pack years: 18.00     Types: Cigarettes     Quit date: 1985     Years since quittin.6    Smokeless tobacco: Never   Vaping Use    Vaping Use: Never used   Substance and Sexual Activity    Alcohol use: Yes     Alcohol/week: 2.0 standard drinks     Types: 1 Glasses of wine, 1 Cans of beer per week     Comment: Occasional    Drug use: No    Sexual activity: Yes     Partners: Male     Birth control/protection: None     Comment: post MP   Other Topics Concern    Not on file   Social History Narrative    Not on file     No visits with results within 3 Month(s) from this visit.    Latest known visit with results is:   Office Visit on 2022   Component Date Value Ref Range Status    VALID INTERNAL CONTROL POC 04/11/2022 Yes   Final    Group A Strep Ag 04/11/2022 Negative  Negative Final    VALID INTERNAL CONTROL POC 04/11/2022 Yes   Final    Influenza A Ag POC 04/11/2022 Negative  Negative Final    Influenza B Ag POC 04/11/2022 Negative  Negative Final    SARS-COV-2 PCR, POC 04/11/2022 Negative  Negative Final    Upper Respiratory Culture 04/11/2022    Final                    Value:Routine evelina  Heavy growth        Review of Systems   Constitutional:  Negative for activity change, fatigue and unexpected weight change. HENT:  Negative for congestion, hearing loss, rhinorrhea and sore throat. Eyes:  Negative for discharge. Respiratory:  Positive for shortness of breath. Negative for cough and chest tightness. Cardiovascular:  Negative for leg swelling. Gastrointestinal:  Negative for abdominal pain, constipation and diarrhea. Genitourinary:  Negative for dysuria, flank pain, frequency and urgency. Musculoskeletal:  Negative for arthralgias, back pain and myalgias. Skin:  Negative for color change and rash. Neurological:  Negative for dizziness, light-headedness and headaches. Psychiatric/Behavioral:  Negative for dysphoric mood and sleep disturbance. The patient is not nervous/anxious. Visit Vitals  /72 (BP 1 Location: Left arm, BP Patient Position: Sitting)   Pulse (!) 55   Temp 98 °F (36.7 °C)   Resp 16   Ht 5' 6\" (1.676 m)   Wt 106 lb 12.8 oz (48.4 kg)   LMP  (LMP Unknown)   SpO2 99%   BMI 17.24 kg/m²      Physical Exam  Vitals and nursing note reviewed. Constitutional:       General: She is not in acute distress. Appearance: Normal appearance. She is well-developed. She is not diaphoretic. HENT:      Right Ear: External ear normal.      Left Ear: External ear normal.   Eyes:      General: No scleral icterus. Right eye: No discharge. Left eye: No discharge.       Extraocular Movements: Extraocular movements intact. Conjunctiva/sclera: Conjunctivae normal.   Cardiovascular:      Rate and Rhythm: Normal rate and regular rhythm. Pulmonary:      Effort: Pulmonary effort is normal.      Breath sounds: Normal breath sounds. No wheezing. Abdominal:      General: Bowel sounds are normal.      Palpations: Abdomen is soft. Tenderness: There is no abdominal tenderness. Musculoskeletal:      Cervical back: Normal range of motion and neck supple. Lymphadenopathy:      Cervical: No cervical adenopathy. Neurological:      Mental Status: She is alert and oriented to person, place, and time. Psychiatric:         Mood and Affect: Mood and affect normal.       An electronic signature was used to authenticate this note.   -- Brittany Baldwin

## 2022-08-24 ENCOUNTER — HOSPITAL ENCOUNTER (OUTPATIENT)
Dept: GENERAL RADIOLOGY | Age: 67
Discharge: HOME | End: 2022-08-24
Attending: INTERNAL MEDICINE
Payer: COMMERCIAL

## 2022-08-24 DIAGNOSIS — I10 ESSENTIAL HYPERTENSION WITH GOAL BLOOD PRESSURE LESS THAN 140/90: ICD-10-CM

## 2022-08-24 DIAGNOSIS — R06.09 DYSPNEA ON EXERTION: ICD-10-CM

## 2022-08-24 DIAGNOSIS — E78.5 HYPERLIPIDEMIA, UNSPECIFIED HYPERLIPIDEMIA TYPE: ICD-10-CM

## 2022-08-24 PROCEDURE — 71046 X-RAY EXAM CHEST 2 VIEWS: CPT

## 2022-08-24 RX ORDER — SIMVASTATIN 40 MG/1
40 TABLET, FILM COATED ORAL
Qty: 90 TABLET | Refills: 0 | Status: SHIPPED | OUTPATIENT
Start: 2022-08-24

## 2022-08-24 RX ORDER — LISINOPRIL 20 MG/1
TABLET ORAL
Qty: 90 TABLET | Refills: 0 | Status: SHIPPED | OUTPATIENT
Start: 2022-08-24

## 2022-09-10 ENCOUNTER — OFFICE VISIT (OUTPATIENT)
Dept: URGENT CARE | Age: 67
End: 2022-09-10
Payer: COMMERCIAL

## 2022-09-10 VITALS — TEMPERATURE: 97.9 F | HEART RATE: 59 BPM | OXYGEN SATURATION: 99 % | RESPIRATION RATE: 14 BRPM

## 2022-09-10 DIAGNOSIS — Z20.822 ENCOUNTER FOR LABORATORY TESTING FOR COVID-19 VIRUS: Primary | ICD-10-CM

## 2022-09-10 LAB — SARS-COV-2 PCR, POC: NEGATIVE

## 2022-09-10 PROCEDURE — 87635 SARS-COV-2 COVID-19 AMP PRB: CPT | Performed by: NURSE PRACTITIONER

## 2022-09-10 PROCEDURE — 99211 OFF/OP EST MAY X REQ PHY/QHP: CPT | Performed by: NURSE PRACTITIONER

## 2022-09-12 ENCOUNTER — APPOINTMENT (OUTPATIENT)
Dept: GENERAL RADIOLOGY | Age: 67
End: 2022-09-12
Attending: PHYSICIAN ASSISTANT
Payer: COMMERCIAL

## 2022-09-12 ENCOUNTER — HOSPITAL ENCOUNTER (EMERGENCY)
Age: 67
Discharge: HOME | End: 2022-09-12
Attending: STUDENT IN AN ORGANIZED HEALTH CARE EDUCATION/TRAINING PROGRAM
Payer: COMMERCIAL

## 2022-09-12 ENCOUNTER — TRANSCRIBE ORDER (OUTPATIENT)
Dept: SCHEDULING | Age: 67
End: 2022-09-12

## 2022-09-12 VITALS
HEART RATE: 58 BPM | RESPIRATION RATE: 16 BRPM | WEIGHT: 105.38 LBS | DIASTOLIC BLOOD PRESSURE: 79 MMHG | TEMPERATURE: 98 F | BODY MASS INDEX: 17.01 KG/M2 | SYSTOLIC BLOOD PRESSURE: 169 MMHG | OXYGEN SATURATION: 100 %

## 2022-09-12 DIAGNOSIS — R06.02 SHORTNESS OF BREATH: Primary | ICD-10-CM

## 2022-09-12 DIAGNOSIS — J20.9 ACUTE BRONCHITIS, UNSPECIFIED ORGANISM: Primary | ICD-10-CM

## 2022-09-12 DIAGNOSIS — Z20.822 CLOSE EXPOSURE TO COVID-19 VIRUS: ICD-10-CM

## 2022-09-12 PROCEDURE — 71046 X-RAY EXAM CHEST 2 VIEWS: CPT

## 2022-09-12 PROCEDURE — 99283 EMERGENCY DEPT VISIT LOW MDM: CPT

## 2022-09-12 RX ORDER — CETIRIZINE HCL 10 MG
10 TABLET ORAL DAILY
Qty: 20 TABLET | Refills: 0 | Status: SHIPPED | OUTPATIENT
Start: 2022-09-12

## 2022-09-12 RX ORDER — ALBUTEROL SULFATE 90 UG/1
2 AEROSOL, METERED RESPIRATORY (INHALATION)
Qty: 1 EACH | Refills: 0 | Status: SHIPPED | OUTPATIENT
Start: 2022-09-12

## 2022-09-12 RX ORDER — DEXTROMETHORPHAN HYDROBROMIDE, GUAIFENESIN 20; 400 MG/20ML; MG/20ML
10 SOLUTION ORAL 4 TIMES DAILY
Qty: 118 ML | Refills: 0 | Status: SHIPPED | OUTPATIENT
Start: 2022-09-12

## 2022-09-12 RX ORDER — PREDNISONE 10 MG/1
TABLET ORAL
Qty: 21 TABLET | Refills: 0 | Status: SHIPPED | OUTPATIENT
Start: 2022-09-12

## 2022-09-12 NOTE — ED PROVIDER NOTES
EMERGENCY DEPARTMENT HISTORY AND PHYSICAL EXAM      Please note that this dictation was completed with [employer], the computer voice recognition software. Quite often unanticipated grammatical, syntax, homophones, and other interpretive errors are inadvertently transcribed by the computer software. Please disregard these errors. Please excuse any errors that have escaped final proofreading. Date: 9/12/2022  Patient Name: Tara Fraire    History of Presenting Illness     Chief Complaint   Patient presents with    Cough     Cough since Friday, now with sore throat & headache. CV negative on Sat. Possible fever at home       History Provided By: Patient    HPI: Tara Fraire, 77 y.o. female tree of allergic rhinitis, hypertension, migraine and others as below presents ambulatory to the ED with cc of a couple of days of 5 out of 10 constant, achy body aches, sore throat, congestion and cough for which she is found no lasting improvement with OTC cough medicines at home. Denies any difficulty swallowing. She tells me she is fully vaccinated against COVID-19 and has had a single booster. 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 She goes on to explain that she went to an urgent care on Saturday and had a negative PCR COVID-19 test.  She Hells me her chest hurts sometimes when she coughs, but denies any shortness of breath. There has been no vomiting or diarrhea. She does not smoke cigarettes. She denies any history of asthma. There are no other complaints, changes, or physical findings at this time. PCP: Aleksander Perez MD    Current Outpatient Medications   Medication Sig Dispense Refill    predniSONE (STERAPRED DS) 10 mg dose pack Per Dose Pack instructions 21 Tablet 0    dextromethorphan-guaiFENesin (Robitussin Cough-Chest Jevon DM) 5-100 mg/5 mL liqd Take 10 mL by mouth four (4) times daily.  118 mL 0    cetirizine (ZyrTEC) 10 mg tablet Take 1 Tablet by mouth daily. 20 Tablet 0    albuterol (PROVENTIL HFA, VENTOLIN HFA, PROAIR HFA) 90 mcg/actuation inhaler Take 2 Puffs by inhalation every four (4) hours as needed for Cough. 1 Each 0    simvastatin (ZOCOR) 40 mg tablet TAKE 1 TABLET BY MOUTH NIGHTLY 90 Tablet 0    lisinopriL (PRINIVIL, ZESTRIL) 20 mg tablet TAKE 1 TABLET BY MOUTH EVERY DAY 90 Tablet 0    montelukast (SINGULAIR) 10 mg tablet Take 1 Tablet by mouth daily. 30 Tablet 3    valACYclovir (VALTREX) 500 mg tablet TAKE 1 TABLET BY MOUTH EVERY DAY 90 Tablet 1    guaiFENesin (Mucinex) 1,200 mg Ta12 ER tablet Take 1,200 mg by mouth two (2) times a day. ZINC PO Take  by mouth. Pt states, C plus Zinc       cetirizine-pseudoePHEDrine (ZyrTEC-D) 5-120 mg Tb12 Take 1 Tablet by mouth as needed. cholecalciferol (VITAMIN D3) 25 mcg (1,000 unit) cap Take  by mouth daily. Blood Pressure Test Kit-Wrist kit Check bp daily and as needed Dx:  Hypertension I10  Indications: hypertension 1 Kit 0    aspirin delayed-release 81 mg tablet Take 1 Tab by mouth daily. 90 Tab 3    fluticasone (FLONASE) 50 mcg/actuation nasal spray 2 Sprays by Both Nostrils route daily. 1 Bottle 0    omega-3 fatty acids-vitamin e 1,000 mg cap Take  by mouth daily. MULTIVITAMINS (MULTIVITAMIN PO) Take  by mouth. Takes 3 times/week       Past History     Past Medical History:  Past Medical History:   Diagnosis Date    Allergic rhinitis, cause unspecified     Arthritis     fingers and toes    Breast mass 06/1999    Right. Benign. due to Prempro. Dr. Essence Valle    Chickenpox childhood    Dizziness 10/2011    Dr. Lag[place] Dose. EBV positive mononucleosis syndrome 10/2010    positive titer. Exposure to hepatitis B         Genital HSV     Head injury, closed, with concussion 10/2018    due to hitting head on trunk of vehicle. Dr. Jackie Painter. Txd w PT.     Heart palpitations 05/21/09, 01/2018    Dr. Megan Felton    Hypertension     Hypoglycemia     Menopausal and postmenopausal disorder 1999    Migraine 1981    MRSA infection 2011    Right hip. Txd Bactrim. Osteopenia 2019    Dr. Sabrina Siegel. Dr. Adela Bliss. Dr. Cindy Adkins 2019    Other and unspecified hyperlipidemia     Raynaud phenomenon     RLS (restless legs syndrome)     Shingles teenager    R arm    Vitamin D deficiency        Past Surgical History:  Past Surgical History:   Procedure Laterality Date    COLONOSCOPY  2016    Dr. Natasha Leong. due q 10 yrs. HX BREAST LUMPECTOMY Right     Benign. Dr. Eda Dockery ORTHOPAEDIC Right 10/2012    Right arm. BENIGN TUMOR REMOVED. Dr. Lesley Lundy       Family History:  Family History   Problem Relation Age of Onset    Other Mother         gallstones    Breast Cancer Mother 62        unilateral.    Hypertension Father     Stroke Father 70        x2 CVAs and several TIAs    Kidney Disease Father     Asthma Paternal Uncle         x 2    Stroke Paternal Aunt     Diabetes Paternal Aunt     Heart Attack Paternal Aunt         multiple    Other Paternal Aunt         gout    Diabetes Paternal Aunt     Colon Cancer Maternal Grandmother 79    Uterine Cancer Maternal Grandmother         uterine    Cancer Maternal Aunt         lung    Cancer Other         maternal cousins (breast)    Breast Cancer Other 35        x 2.  bilaterally. Alzheimer's Disease Maternal Aunt         x 2    Other Paternal Grandmother         kyphosis due to dementia posturing    Dementia Paternal Grandmother     Cancer Maternal Grandfather         ? Other Paternal Grandfather         MVA    Heart Attack Maternal Uncle 76       Social History:  Social History     Tobacco Use    Smoking status: Former     Packs/day: 0.50     Years: 36.00     Pack years: 18.00     Types: Cigarettes     Quit date: 1985     Years since quittin.7    Smokeless tobacco: Never   Vaping Use    Vaping Use: Never used   Substance Use Topics    Alcohol use:  Yes     Alcohol/week: 2.0 standard drinks Types: 1 Glasses of wine, 1 Cans of beer per week     Comment: Occasional    Drug use: No       Allergies: Allergies   Allergen Reactions    Prempro [Conj Estrog-Medroxyprogest Ace] Other (comments)     Benign Right breast mass. Review of Systems   Review of Systems   Constitutional:  Negative for fever. HENT:  Positive for congestion and sore throat. Negative for trouble swallowing. Eyes:  Negative for pain. Respiratory:  Positive for cough. Negative for shortness of breath. Cardiovascular:  Negative for chest pain. Gastrointestinal:  Negative for abdominal pain. Genitourinary:  Negative for flank pain. Musculoskeletal:  Negative for back pain. Skin:  Negative for rash. Neurological:  Negative for headaches. Physical Exam   Physical Exam  Vitals and nursing note reviewed. Constitutional:       General: She is not in acute distress. Appearance: She is well-developed. HENT:      Head: Normocephalic and atraumatic. Jaw: No trismus. Right Ear: External ear normal.      Left Ear: External ear normal.      Nose: Nose normal.      Mouth/Throat:      Mouth: Mucous membranes are moist.      Pharynx: Uvula midline. No pharyngeal swelling, oropharyngeal exudate or posterior oropharyngeal erythema. Tonsils: 0 on the right. 0 on the left. Eyes:      Conjunctiva/sclera: Conjunctivae normal.      Pupils: Pupils are equal, round, and reactive to light. Cardiovascular:      Rate and Rhythm: Normal rate and regular rhythm. Pulmonary:      Effort: Pulmonary effort is normal. No respiratory distress. Abdominal:      Palpations: Abdomen is soft. Tenderness: There is no abdominal tenderness. Musculoskeletal:         General: Normal range of motion. Cervical back: Full passive range of motion without pain and normal range of motion. Skin:     Findings: No rash. Neurological:      Mental Status: She is alert and oriented to person, place, and time.  She is not disoriented. GCS: GCS eye subscore is 4. GCS verbal subscore is 5. GCS motor subscore is 6. Cranial Nerves: No cranial nerve deficit. Psychiatric:         Speech: Speech normal.     Diagnostic Study Results     Labs -   No results found for this or any previous visit (from the past 12 hour(s)). Radiologic Studies -   XR CHEST PA LAT   Final Result   Normal chest.        CT Results  (Last 48 hours)      None          CXR Results  (Last 48 hours)                 09/12/22 1128  XR CHEST PA LAT Final result    Impression:  Normal chest.       Narrative:  EXAM: XR CHEST PA LAT       INDICATION: cough       COMPARISON: 8/24/2022. FINDINGS: PA and lateral radiographs of the chest demonstrate clear lungs. The   cardiac and mediastinal contours and pulmonary vascularity are normal. The bones   and soft tissues are within normal limits. Medical Decision Making   I am the first provider for this patient. I reviewed the vital signs, available nursing notes, past medical history, past surgical history, family history and social history. Vital Signs-Reviewed the patient's vital signs. Patient Vitals for the past 12 hrs:   Temp Pulse Resp BP SpO2   09/12/22 1023 98 °F (36.7 °C) (!) 58 16 (!) 169/79 100 %       Pulse Oximetry Analysis - 100% on RA    Records Reviewed: Nursing Notes, Old Medical Records, Previous Radiology Studies, and Previous Laboratory Studies    Provider Notes (Medical Decision Making):   DDx: Pneumonia, bronchitis, viral syndrome    Afebrile and well-appearing. Patient presents with cough and congestion for the last couple of days. There has been no fever. She tells me she did have a negative COVID-19 test yesterday. Chest x-ray is negative. Additional testing deferred. ED Course:   Initial assessment performed. The patients presenting problems have been discussed, and they are in agreement with the care plan formulated and outlined with them.   I have encouraged them to ask questions as they arise throughout their visit. Disposition:  Discharge    PLAN:  1. Current Discharge Medication List        START taking these medications    Details   predniSONE (STERAPRED DS) 10 mg dose pack Per Dose Pack instructions  Qty: 21 Tablet, Refills: 0  Start date: 9/12/2022      dextromethorphan-guaiFENesin (Robitussin Cough-Chest Jevon DM) 5-100 mg/5 mL liqd Take 10 mL by mouth four (4) times daily. Qty: 118 mL, Refills: 0  Start date: 9/12/2022      cetirizine (ZyrTEC) 10 mg tablet Take 1 Tablet by mouth daily. Qty: 20 Tablet, Refills: 0  Start date: 9/12/2022      albuterol (PROVENTIL HFA, VENTOLIN HFA, PROAIR HFA) 90 mcg/actuation inhaler Take 2 Puffs by inhalation every four (4) hours as needed for Cough. Qty: 1 Each, Refills: 0  Start date: 9/12/2022           2. Follow-up Information       Follow up With Specialties Details Why Luis Millan MD Internal Medicine Physician, Bariatrics Call  PRIMARY CARE: as needed [facility] [address] [address]             Return to ED if worse     Diagnosis     Clinical Impression:   1.  Acute bronchitis, unspecified organism    2. Close exposure to COVID-19 virus

## 2022-10-17 ENCOUNTER — HOSPITAL ENCOUNTER (OUTPATIENT)
Dept: NON INVASIVE DIAGNOSTICS | Age: 67
Discharge: HOME | End: 2022-10-17
Attending: INTERNAL MEDICINE
Payer: COMMERCIAL

## 2022-10-17 VITALS — BODY MASS INDEX: 16.88 KG/M2 | WEIGHT: 105 LBS | HEIGHT: 66 IN

## 2022-10-17 DIAGNOSIS — R06.02 SHORTNESS OF BREATH: ICD-10-CM

## 2022-10-17 PROCEDURE — 93306 TTE W/DOPPLER COMPLETE: CPT

## 2022-10-19 LAB
ECHO AO ASC DIAM: 2.3 CM
ECHO AO ASCENDING AORTA INDEX: 1.51 CM/M2
ECHO AO ROOT DIAM: 3.1 CM
ECHO AO ROOT INDEX: 2.04 CM/M2
ECHO AV AREA PEAK VELOCITY: 1.6 CM2
ECHO AV AREA VTI: 1.5 CM2
ECHO AV AREA/BSA PEAK VELOCITY: 1.1 CM2/M2
ECHO AV AREA/BSA VTI: 1 CM2/M2
ECHO AV MEAN GRADIENT: 4 MMHG
ECHO AV MEAN VELOCITY: 1 M/S
ECHO AV PEAK GRADIENT: 7 MMHG
ECHO AV PEAK VELOCITY: 1.3 M/S
ECHO AV VELOCITY RATIO: 0.77
ECHO AV VTI: 34.6 CM
ECHO LA DIAMETER INDEX: 1.64 CM/M2
ECHO LA DIAMETER: 2.5 CM
ECHO LA TO AORTIC ROOT RATIO: 0.81
ECHO LA VOL 4C: 25 ML (ref 22–52)
ECHO LA VOLUME INDEX A4C: 16 ML/M2 (ref 16–34)
ECHO LV E' LATERAL VELOCITY: 13 CM/S
ECHO LV E' SEPTAL VELOCITY: 9 CM/S
ECHO LV EDV A4C: 85 ML
ECHO LV EDV INDEX A4C: 56 ML/M2
ECHO LV EJECTION FRACTION A4C: 65 %
ECHO LV ESV A4C: 30 ML
ECHO LV ESV INDEX A4C: 20 ML/M2
ECHO LV FRACTIONAL SHORTENING: 12 % (ref 28–44)
ECHO LV INTERNAL DIMENSION DIASTOLE INDEX: 2.24 CM/M2
ECHO LV INTERNAL DIMENSION DIASTOLIC: 3.4 CM (ref 3.9–5.3)
ECHO LV INTERNAL DIMENSION SYSTOLIC INDEX: 1.97 CM/M2
ECHO LV INTERNAL DIMENSION SYSTOLIC: 3 CM
ECHO LV IVSD: 1.1 CM (ref 0.6–0.9)
ECHO LV MASS 2D: 106.3 G (ref 67–162)
ECHO LV MASS INDEX 2D: 69.9 G/M2 (ref 43–95)
ECHO LV POSTERIOR WALL DIASTOLIC: 1 CM (ref 0.6–0.9)
ECHO LV RELATIVE WALL THICKNESS RATIO: 0.59
ECHO LVOT AREA: 2 CM2
ECHO LVOT AV VTI INDEX: 0.71
ECHO LVOT DIAM: 1.6 CM
ECHO LVOT MEAN GRADIENT: 2 MMHG
ECHO LVOT PEAK GRADIENT: 4 MMHG
ECHO LVOT PEAK VELOCITY: 1 M/S
ECHO LVOT STROKE VOLUME INDEX: 32.7 ML/M2
ECHO LVOT SV: 49.6 ML
ECHO LVOT VTI: 24.7 CM
ECHO MV A VELOCITY: 0.51 M/S
ECHO MV E DECELERATION TIME (DT): 327.4 MS
ECHO MV E VELOCITY: 0.66 M/S
ECHO MV E/A RATIO: 1.29
ECHO MV E/E' LATERAL: 5.08
ECHO MV E/E' RATIO (AVERAGED): 6.21
ECHO MV E/E' SEPTAL: 7.33
ECHO PV MAX VELOCITY: 0.9 M/S
ECHO PV PEAK GRADIENT: 3 MMHG
ECHO RV FREE WALL PEAK S': 9 CM/S
ECHO RV INTERNAL DIMENSION: 3.4 CM
ECHO RV TAPSE: 1.7 CM (ref 1.7–?)
ECHO TV REGURGITANT MAX VELOCITY: 2.21 M/S
ECHO TV REGURGITANT PEAK GRADIENT: 19 MMHG

## 2022-11-04 DIAGNOSIS — A60.00 GENITAL HERPES SIMPLEX, UNSPECIFIED SITE: ICD-10-CM

## 2022-11-04 RX ORDER — VALACYCLOVIR HYDROCHLORIDE 500 MG/1
TABLET, FILM COATED ORAL
Qty: 90 TABLET | Refills: 1 | Status: SHIPPED | OUTPATIENT
Start: 2022-11-04

## 2023-01-20 ENCOUNTER — OFFICE VISIT (OUTPATIENT)
Dept: URGENT CARE | Age: 68
End: 2023-01-20
Payer: COMMERCIAL

## 2023-01-20 VITALS
BODY MASS INDEX: 17.21 KG/M2 | HEIGHT: 66 IN | WEIGHT: 107.1 LBS | TEMPERATURE: 98.1 F | RESPIRATION RATE: 18 BRPM | SYSTOLIC BLOOD PRESSURE: 156 MMHG | HEART RATE: 63 BPM | DIASTOLIC BLOOD PRESSURE: 88 MMHG | OXYGEN SATURATION: 100 %

## 2023-01-20 DIAGNOSIS — J30.89 ENVIRONMENTAL AND SEASONAL ALLERGIES: ICD-10-CM

## 2023-01-20 DIAGNOSIS — J01.90 ACUTE SINUSITIS, RECURRENCE NOT SPECIFIED, UNSPECIFIED LOCATION: Primary | ICD-10-CM

## 2023-01-20 RX ORDER — PREDNISONE 5 MG/1
TABLET ORAL
Qty: 21 TABLET | Refills: 0 | Status: SHIPPED | OUTPATIENT
Start: 2023-01-20

## 2023-01-20 NOTE — PROGRESS NOTES
Subjective: (As above and below)     The patient/guardian gave verbal consent to treat. Chief Complaint   Patient presents with    Sinus Infection     Complains of sinus infection. Sx consist of runny nose, sinus pressure and headaches. Sx for 3 days     Apple Haile is a 79 y.o. female who presents for evaluation of : sinus infection; runny nose sinus pressure . Symptom onset 3 days ago . Preceding illness: none. No other identified aggravating or alleviating factors. Symptoms are constant and overall unchanged. Promotes no decrease in PO intake of fluids. Denies: SOB, fever, chills    Known Exposure to COVID-19: no      ROS  Review of Systems - negative except as listed above    Reviewed PmHx, RxHx, FmHx, SocHx, AllgHx and updated in chart. Family History   Problem Relation Age of Onset    Other Mother         gallstones    Breast Cancer Mother 62        unilateral.    Hypertension Father     Stroke Father 70        x2 CVAs and several TIAs    Kidney Disease Father     Asthma Paternal Uncle         x 2    Stroke Paternal Aunt     Diabetes Paternal Aunt     Heart Attack Paternal Aunt         multiple    Other Paternal Aunt         gout    Diabetes Paternal Aunt     Colon Cancer Maternal Grandmother 79    Uterine Cancer Maternal Grandmother         uterine    Cancer Maternal Aunt         lung    Cancer Other         maternal cousins (breast)    Breast Cancer Other 35        x 2.  bilaterally. Alzheimer's Disease Maternal Aunt         x 2    Other Paternal Grandmother         kyphosis due to dementia posturing    Dementia Paternal Grandmother     Cancer Maternal Grandfather         ? Other Paternal Grandfather         MVA    Heart Attack Maternal Uncle 76       Past Medical History:   Diagnosis Date    Allergic rhinitis, cause unspecified     Arthritis     fingers and toes    Breast mass 06/1999    Right. Benign. due to Prempro.   Dr. Tona Ramirez    Chickenpox childhood    Dizziness 10/2011 Dr. Fabio Zarate. EBV positive mononucleosis syndrome 10/2010    positive titer. Exposure to hepatitis B         Genital HSV     Head injury, closed, with concussion 10/2018    due to hitting head on trunk of vehicle. Dr. Manuel Pickett. Txd w PT. Heart palpitations 09, 2018    Dr. Marleny Hinojosa    Hypertension     Hypoglycemia     Menopausal and postmenopausal disorder     Migraine 1981    MRSA infection 2011    Right hip. Txd Bactrim. Osteopenia 2019    Dr. Nataliia Schaffer. Dr. Starr Friends. Dr. Chrisitna Son 2019    Other and unspecified hyperlipidemia     Raynaud phenomenon     RLS (restless legs syndrome)     Shingles teenager    R arm    Vitamin D deficiency 2008      Social History     Socioeconomic History    Marital status:    Tobacco Use    Smoking status: Former     Packs/day: 0.50     Years: 36.00     Pack years: 18.00     Types: Cigarettes     Quit date: 1985     Years since quittin.0    Smokeless tobacco: Never   Vaping Use    Vaping Use: Never used   Substance and Sexual Activity    Alcohol use: Yes     Alcohol/week: 2.0 standard drinks     Types: 1 Glasses of wine, 1 Cans of beer per week     Comment: Occasional    Drug use: No    Sexual activity: Yes     Partners: Male     Birth control/protection: None     Comment: post MP          Current Outpatient Medications   Medication Sig    predniSONE (STERAPRED) 5 mg dose pack See administration instruction per 5mg dose pack    simvastatin (ZOCOR) 40 mg tablet TAKE 1 TABLET BY MOUTH NIGHTLY    valACYclovir (VALTREX) 500 mg tablet TAKE 1 TABLET BY MOUTH EVERY DAY    predniSONE (STERAPRED DS) 10 mg dose pack Per Dose Pack instructions    dextromethorphan-guaiFENesin (Robitussin Cough-Chest Jevon DM) 5-100 mg/5 mL liqd Take 10 mL by mouth four (4) times daily. cetirizine (ZyrTEC) 10 mg tablet Take 1 Tablet by mouth daily.     albuterol (PROVENTIL HFA, VENTOLIN HFA, PROAIR HFA) 90 mcg/actuation inhaler Take 2 Puffs by inhalation every four (4) hours as needed for Cough. lisinopriL (PRINIVIL, ZESTRIL) 20 mg tablet TAKE 1 TABLET BY MOUTH EVERY DAY    montelukast (SINGULAIR) 10 mg tablet Take 1 Tablet by mouth daily. guaiFENesin (Mucinex) 1,200 mg Ta12 ER tablet Take 1,200 mg by mouth two (2) times a day. ZINC PO Take  by mouth. Pt states, C plus Zinc     cetirizine-pseudoePHEDrine (ZyrTEC-D) 5-120 mg Tb12 Take 1 Tablet by mouth as needed. cholecalciferol (VITAMIN D3) 25 mcg (1,000 unit) cap Take  by mouth daily. Blood Pressure Test Kit-Wrist kit Check bp daily and as needed Dx:  Hypertension I10  Indications: hypertension    aspirin delayed-release 81 mg tablet Take 1 Tab by mouth daily. fluticasone (FLONASE) 50 mcg/actuation nasal spray 2 Sprays by Both Nostrils route daily. omega-3 fatty acids-vitamin e 1,000 mg cap Take  by mouth daily. MULTIVITAMINS (MULTIVITAMIN PO) Take  by mouth. Takes 3 times/week     No current facility-administered medications for this visit. Objective:     Vitals:    01/20/23 1120   BP: (!) 156/88   Pulse: 63   Resp: 18   Temp: 98.1 °F (36.7 °C)   SpO2: 100%   Weight: 107 lb 1.6 oz (48.6 kg)   Height: 5' 6\" (1.676 m)       Physical Exam  General appearance - appears well hydrated and does not appear toxic, no acute distress  Eyes - EOMs intact. Non injected. No scleral icterus   Ears - no external swelling. TMs normal bilat. Nose - nasal congestino. No purulent drainage  Mouth - OP clear without swelling, exudate or lesion. Mucus membranes moist. Uvula midline. Neck/Lymphatics - trachea midline, full AROM, no LAD of neck  Chest - Normal breathing effort no wheeze rales, rhonchi or diminishments bilaterally. Heart - RRR, no murmurs  Skin - no observable rashes or pallor  Neurologic- alert and oriented x 3  Psychiatric- normal mood, behavior and though content. Assessment/ Plan:     1.  Acute sinusitis, recurrence not specified, unspecified location    - predniSONE (STERAPRED) 5 mg dose pack; See administration instruction per 5mg dose pack  Dispense: 21 Tablet; Refill: 0      Likely viral  Not bacterial process at this time  Start prednisone for relief  Nasal saline humidified air  Re start flonase      Follow up: Follow up immediately for any new, worsening or changes or if symptoms are not improving over the next 5-7 days.          Luci Figueroa, NP

## 2023-01-22 RX ORDER — MONTELUKAST SODIUM 10 MG/1
10 TABLET ORAL DAILY
Qty: 90 TABLET | Refills: 0 | Status: SHIPPED | OUTPATIENT
Start: 2023-01-22 | End: 2023-04-22

## 2023-01-31 DIAGNOSIS — I10 ESSENTIAL HYPERTENSION WITH GOAL BLOOD PRESSURE LESS THAN 140/90: ICD-10-CM

## 2023-01-31 RX ORDER — LISINOPRIL 20 MG/1
TABLET ORAL
Qty: 90 TABLET | Refills: 0 | Status: SHIPPED | OUTPATIENT
Start: 2023-01-31

## 2023-02-20 ENCOUNTER — NURSE TRIAGE (OUTPATIENT)
Dept: OTHER | Facility: CLINIC | Age: 68
End: 2023-02-20

## 2023-02-20 NOTE — TELEPHONE ENCOUNTER
Location of patient: [address] abreu from Yael Frazier at [facility] with [employer] Subjective: Caller states \"I think it might be stress - I am trying to take care of my  who has cancer\"     Current Symptoms: intermittent headache, fatigue, sometimes I have a wave of lightheadedness    Onset: 1 month ago; worsening    Associated Symptoms: increased wakefulness    Pain Severity: 4/10 (headache); aching; intermittent    Temperature: denies     What has been tried: OTC meds, extra fluids    LMP: NA Pregnant: No    Recommended disposition: See in Office Within 2 Weeks    Care advice provided, patient verbalizes understanding; denies any other questions or concerns; instructed to call back for any new or worsening symptoms. Patient has an appt scheduled for March 6th    Attention Provider: Thank you for allowing me to participate in the care of your patient. The patient was connected to triage in response to information provided to the [facility]. Please do not respond through this encounter as the response is not directed to a shared pool.       Reason for Disposition   Weakness is a chronic symptom (recurrent or ongoing AND lasting > 4 weeks)    Protocols used: Weakness (Generalized) and Fatigue-ADULT-[place]

## 2023-03-03 ENCOUNTER — OFFICE VISIT (OUTPATIENT)
Dept: PRIMARY CARE CLINIC | Age: 68
End: 2023-03-03

## 2023-03-03 VITALS
HEART RATE: 54 BPM | DIASTOLIC BLOOD PRESSURE: 84 MMHG | WEIGHT: 110.2 LBS | BODY MASS INDEX: 17.71 KG/M2 | HEIGHT: 66 IN | TEMPERATURE: 97.8 F | OXYGEN SATURATION: 100 % | RESPIRATION RATE: 16 BRPM | SYSTOLIC BLOOD PRESSURE: 132 MMHG

## 2023-03-03 DIAGNOSIS — R42 DIZZINESS: ICD-10-CM

## 2023-03-03 DIAGNOSIS — I10 PRIMARY HYPERTENSION: Primary | ICD-10-CM

## 2023-03-03 DIAGNOSIS — E78.2 MIXED HYPERLIPIDEMIA: ICD-10-CM

## 2023-03-03 DIAGNOSIS — R51.9 FREQUENT HEADACHES: ICD-10-CM

## 2023-03-03 DIAGNOSIS — F43.29 STRESS AND ADJUSTMENT REACTION: ICD-10-CM

## 2023-03-03 DIAGNOSIS — F51.01 PRIMARY INSOMNIA: ICD-10-CM

## 2023-03-03 RX ORDER — AMITRIPTYLINE HYDROCHLORIDE 10 MG/1
10 TABLET, FILM COATED ORAL
Qty: 30 TABLET | Refills: 0 | Status: SHIPPED | OUTPATIENT
Start: 2023-03-03 | End: 2023-04-02

## 2023-03-03 NOTE — PROGRESS NOTES
1. \"Have you been to the ER, urgent care clinic since your last visit? Hospitalized since your last visit? \" No    2. \"Have you seen or consulted any other health care providers outside of the [address] [place] [address] since your last visit? \" Yes When: Did go to a doctor for allergies       3. For patients aged 39-70: Has the patient had a colonoscopy / FIT/ Cologuard? Yes - no Care Gap present      If the patient is female:    4. For patients aged 41-77: Has the patient had a mammogram within the past 2 years? Yes - no Care Gap present      5. For patients aged 21-65: Has the patient had a pap smear? Yes - no Care Gap present  [place] [address] Didn't get one this last time because they are dong hers ever other year. .  Chief Complaint   Patient presents with    Follow-up     BP  Also bowels don't seem to be regular.      Fatigue    Dizziness    Headache

## 2023-03-03 NOTE — PROGRESS NOTES
Katelyn Hernandez (: 1955) is a 79 y.o. female, established patient, here for evaluation of the following chief complaint(s):  Follow-up (BP/Also bowels don't seem to be regular. ), Fatigue, Dizziness, and Headache       ASSESSMENT/PLAN:  Below is the assessment and plan developed based on review of pertinent history, physical exam, labs, studies, and medications. 1. Primary hypertension  -     METABOLIC PANEL, COMPREHENSIVE; Future  -     CBC W/O DIFF; Future  I ordered a CBC and CMP. I recommend that she continue taking lisinopril 20 mg daily. 2. Frequent headaches  -     amitriptyline (ELAVIL) 10 mg tablet; Take 1 Tablet by mouth nightly for 30 days. , Normal, Disp-30 Tablet, R-0 sent to pharmacy. I prescribed amitriptyline 10 mg. Potential side effects were discussed. 3. Stress and adjustment reaction  I do not recommend any medication at this time. 4. Mixed hyperlipidemia  -     LIPID PANEL; Future  I ordered a lipid panel. I recommend that she continue taking simvastatin 40 mg daily. 5. Dizziness  I recommend that she drink 64 oz of water daily. 6. Primary insomnia  -     amitriptyline (ELAVIL) 10 mg tablet; Take 1 Tablet by mouth nightly for 30 days. , Normal, Disp-30 Tablet, R-0 sent to pharmacy. I prescribed amitriptyline 10 mg. Potential side effects were discussed. SUBJECTIVE/OBJECTIVE:  HPI  Patient presents today for fatigue, dizziness, headache, and constipation. She is fasting today. Her BP is elevated today in office at 148/82, 132/84 on manual repeat in left arm while sitting down. She has not been checking her BP at home. She reports slight headaches in the morning and dizziness. She states that the headaches do not feel like migraines. Her dizziness occurs randomly but does not occur every day. She has not taken any medication today and usually takes them after she eats. She normally takes lisinopril 20 mg daily.   She denies fever, chills, or leg numbness/tingling. She went to Urgent Care with a sore throat and headache and was told she has allergies. She reports that her job is stressful. She has not sleeping well. She has had a death in the family and her  has cancer. She reports more floaters in her vision. She reports that she has a bowel movement every few days. She has been taking Ibirapita 5422. She notes that her urine smells stronger but she denies any dysuria. She takes simvastatin 40 mg at night. Patient Active Problem List   Diagnosis Code    Exposure to hepatitis B Z20.5    Raynaud phenomenon I73.00    Menopausal and postmenopausal disorder N95.9    Osteopenia M85.80    Genital HSV A60.00    RLS (restless legs syndrome) G25.81    Dyslipidemia E78.5    Allergic conjunctivitis and rhinitis H10.10, J30.9    Essential hypertension with goal blood pressure less than 140/90 I10    Family history of Alzheimer's disease Z82.0    Family history of stroke Z82.3    Family history of heart attack Z82.49    Family history of kidney disease Z84.1    Family history of colon cancer Z80.0    Family history of breast cancer Z80.3    Vitamin D deficiency E55.9    Migraine G43.909    History of COVID-19 Z86.16        Current Outpatient Medications on File Prior to Visit   Medication Sig Dispense Refill    lisinopriL (PRINIVIL, ZESTRIL) 20 mg tablet TAKE 1 TABLET BY MOUTH EVERY DAY 90 Tablet 0    montelukast (SINGULAIR) 10 mg tablet Take 1 Tablet by mouth daily for 90 days. 90 Tablet 0    simvastatin (ZOCOR) 40 mg tablet TAKE 1 TABLET BY MOUTH NIGHTLY 90 Tablet 0    valACYclovir (VALTREX) 500 mg tablet TAKE 1 TABLET BY MOUTH EVERY DAY 90 Tablet 1    dextromethorphan-guaiFENesin (Robitussin Cough-Chest Jevon DM) 5-100 mg/5 mL liqd Take 10 mL by mouth four (4) times daily. 118 mL 0    cetirizine (ZyrTEC) 10 mg tablet Take 1 Tablet by mouth daily.  20 Tablet 0    guaiFENesin (Mucinex) 1,200 mg Ta12 ER tablet Take 1,200 mg by mouth two (2) times a day. ZINC PO Take  by mouth. Pt states, C plus Zinc       cetirizine-pseudoePHEDrine (ZyrTEC-D) 5-120 mg Tb12 Take 1 Tablet by mouth as needed. cholecalciferol (VITAMIN D3) 25 mcg (1,000 unit) cap Take  by mouth daily. Blood Pressure Test Kit-Wrist kit Check bp daily and as needed Dx:  Hypertension I10  Indications: hypertension 1 Kit 0    aspirin delayed-release 81 mg tablet Take 1 Tab by mouth daily. 90 Tab 3    fluticasone (FLONASE) 50 mcg/actuation nasal spray 2 Sprays by Both Nostrils route daily. 1 Bottle 0    omega-3 fatty acids-vitamin e 1,000 mg cap Take  by mouth daily. MULTIVITAMINS (MULTIVITAMIN PO) Take  by mouth. Takes 3 times/week      predniSONE (STERAPRED) 5 mg dose pack See administration instruction per 5mg dose pack (Patient not taking: Reported on 3/3/2023) 21 Tablet 0    predniSONE (STERAPRED DS) 10 mg dose pack Per Dose Pack instructions (Patient not taking: Reported on 3/3/2023) 21 Tablet 0    albuterol (PROVENTIL HFA, VENTOLIN HFA, PROAIR HFA) 90 mcg/actuation inhaler Take 2 Puffs by inhalation every four (4) hours as needed for Cough. (Patient not taking: Reported on 3/3/2023) 1 Each 0     No current facility-administered medications on file prior to visit. Allergies   Allergen Reactions    Prempro [Conj Estrog-Medroxyprogest Ace] Other (comments)     Benign Right breast mass. Past Medical History:   Diagnosis Date    Allergic rhinitis, cause unspecified     Arthritis     fingers and toes    Breast mass 06/1999    Right. Benign. due to Prempro. Dr. Jillian Jolley    Chickenpox childhood    Dizziness 10/2011    Dr. Julian Navarro. EBV positive mononucleosis syndrome 10/2010    positive titer. Exposure to hepatitis B         Genital HSV     Head injury, closed, with concussion 10/2018    due to hitting head on trunk of vehicle. Dr. Chris Crespo. Txd w PT.     Heart palpitations 05/21/09, 01/2018    Dr. Phillip Mauricio    Hypertension Hypoglycemia     Menopausal and postmenopausal disorder 1999    Migraine 1981    MRSA infection 12/2011    Right hip. Txd Bactrim. Osteopenia 03/2019    Dr. Cabrera Cr. Dr. Leahy Heart. Dr. Ross Appl 03/2019    Other and unspecified hyperlipidemia     Raynaud phenomenon     RLS (restless legs syndrome) 2008    Shingles teenager    R arm    Vitamin D deficiency 2008       Past Surgical History:   Procedure Laterality Date    COLONOSCOPY  09/19/2016    Dr. Katharine Wright. due q 10 yrs. HX BREAST LUMPECTOMY Right 1999    Benign. Dr. Moshe Thacker ORTHOPAEDIC Right 10/2012    Right arm. BENIGN TUMOR REMOVED. Dr. An Gaffney       Family History   Problem Relation Age of Onset    Other Mother         gallstones    Breast Cancer Mother 62        unilateral.    Hypertension Father     Stroke Father 70        x2 CVAs and several TIAs    Kidney Disease Father     Asthma Paternal Uncle         x 2    Stroke Paternal Aunt     Diabetes Paternal Aunt     Heart Attack Paternal Aunt         multiple    Other Paternal Aunt         gout    Diabetes Paternal Aunt     Colon Cancer Maternal Grandmother 79    Uterine Cancer Maternal Grandmother         uterine    Cancer Maternal Aunt         lung    Cancer Other         maternal cousins (breast)    Breast Cancer Other 35        x 2.  bilaterally. Alzheimer's Disease Maternal Aunt         x 2    Other Paternal Grandmother         kyphosis due to dementia posturing    Dementia Paternal Grandmother     Cancer Maternal Grandfather         ?     Other Paternal Grandfather         MVA    Heart Attack Maternal Uncle 76       Social History     Socioeconomic History    Marital status:      Spouse name: Not on file    Number of children: Not on file    Years of education: Not on file    Highest education level: Not on file   Occupational History    Not on file   Tobacco Use    Smoking status: Former     Packs/day: 0.50     Years: 36.00     Pack years: 18.00     Types: Cigarettes     Quit date: 1985     Years since quittin.1    Smokeless tobacco: Never   Vaping Use    Vaping Use: Never used   Substance and Sexual Activity    Alcohol use: Yes     Alcohol/week: 2.0 standard drinks     Types: 1 Glasses of wine, 1 Cans of beer per week     Comment: Occasional    Drug use: No    Sexual activity: Yes     Partners: Male     Birth control/protection: None     Comment: post MP   Other Topics Concern    Not on file   Social History Narrative    Not on file     Social Determinants of Health     Financial Resource Strain: Not on file   Food Insecurity: Not on file   Transportation Needs: Not on file   Physical Activity: Not on file   Stress: Not on file   Social Connections: Not on file   Intimate Partner Violence: Not on file   Housing Stability: Not on file       No visits with results within 3 Month(s) from this visit.    Latest known visit with results is:   Hospital Outpatient Visit on 10/17/2022   Component Date Value Ref Range Status    IVSd 10/17/2022 1.1 (A)  0.6 - 0.9 cm Final    LVIDd 10/17/2022 3.4 (A)  3.9 - 5.3 cm Final    LVIDs 10/17/2022 3.0  cm Final    LVOT Diameter 10/17/2022 1.6  cm Final    LVPWd 10/17/2022 1.0 (A)  0.6 - 0.9 cm Final    LV Ejection Fraction A4C 10/17/2022 65  % Final    LV EDV A4C 10/17/2022 85  mL Final    LV ESV A4C 10/17/2022 30  mL Final    LVOT Peak Gradient 10/17/2022 4  mmHg Final    LVOT Mean Gradient 10/17/2022 2  mmHg Final    LVOT SV 10/17/2022 49.6  ml Final    LVOT Peak Velocity 10/17/2022 1.0  m/s Final    LVOT VTI 10/17/2022 24.7  cm Final    RVIDd 10/17/2022 3.4  cm Final    RV Free Wall Peak S' 10/17/2022 9  cm/s Final    LA Diameter 10/17/2022 2.5  cm Final    LA Volume 4C 10/17/2022 25  22 - 52 mL Final    AV Area by Peak Velocity 10/17/2022 1.6  cm2 Final    AV Area by VTI 10/17/2022 1.5  cm2 Final    AV Peak Gradient 10/17/2022 7  mmHg Final    AV Mean Gradient 10/17/2022 4  mmHg Final    AV Peak Velocity 10/17/2022 1.3  m/s Final    AV Mean Velocity 10/17/2022 1.0  m/s Final    AV VTI 10/17/2022 34.6  cm Final    MV A Velocity 10/17/2022 0.51  m/s Final    MV E Wave Deceleration Time 10/17/2022 327.4  ms Final    MV E Velocity 10/17/2022 0.66  m/s Final    LV E' Lateral Velocity 10/17/2022 13  cm/s Final    LV E' Septal Velocity 10/17/2022 9  cm/s Final    PV Peak Gradient 10/17/2022 3  mmHg Final    PV Max Velocity 10/17/2022 0.9  m/s Final    TAPSE 10/17/2022 1.7  1.7 cm Final    TR Peak Gradient 10/17/2022 19  mmHg Final    TR Max Velocity 10/17/2022 2.21  m/s Final    Ascending Aorta 10/17/2022 2.3  cm Final    Aortic Root 10/17/2022 3.1  cm Final    Fractional Shortening 2D 10/17/2022 12  28 - 44 % Final    LV ESV Index A4C 10/17/2022 20  mL/m2 Final    LV EDV Index A4C 10/17/2022 56  mL/m2 Final    LVIDd Index 10/17/2022 2.24  cm/m2 Final    LVIDs Index 10/17/2022 1.97  cm/m2 Final    LV RWT Ratio 10/17/2022 0.59   Final    LV Mass 2D 10/17/2022 106.3  67 - 162 g Final    LV Mass 2D Index 10/17/2022 69.9  43 - 95 g/m2 Final    MV E/A 10/17/2022 1.29   Final    E/E' Ratio (Averaged) 10/17/2022 6.21   Final    E/E' Lateral 10/17/2022 5.08   Final    E/E' Septal 10/17/2022 7.33   Final    LVOT Stroke Volume Index 10/17/2022 32.7  mL/m2 Final    LVOT Area 10/17/2022 2.0  cm2 Final    LA Volume Index 4C 10/17/2022 16  16 - 34 mL/m2 Final    LA Size Index 10/17/2022 1.64  cm/m2 Final    LA/AO Root Ratio 10/17/2022 0.81   Final    Ao Root Index 10/17/2022 2.04  cm/m2 Final    Ascending Aorta Index 10/17/2022 1.51  cm/m2 Final    AV Velocity Ratio 10/17/2022 0.77   Final    LVOT:AV VTI Index 10/17/2022 0.71   Final    NGOC/BSA VTI 10/17/2022 1.0  cm2/m2 Final    NGOC/BSA Peak Velocity 10/17/2022 1.1  cm2/m2 Final       Review of Systems   Constitutional:  Negative for activity change, fatigue and unexpected weight change. HENT:  Negative for congestion, hearing loss, rhinorrhea and sore throat. Eyes:  Negative for discharge. Respiratory:  Negative for cough, chest tightness and shortness of breath. Cardiovascular:  Negative for leg swelling. Gastrointestinal:  Positive for constipation. Negative for abdominal pain and diarrhea. Genitourinary:  Negative for dysuria, flank pain, frequency and urgency. Musculoskeletal:  Negative for arthralgias, back pain and myalgias. Skin:  Negative for color change and rash. Neurological:  Positive for dizziness and headaches. Negative for light-headedness. Psychiatric/Behavioral:  Positive for sleep disturbance. Negative for dysphoric mood. The patient is nervous/anxious. Visit Vitals  /84 (BP 1 Location: Left upper arm, BP Patient Position: Sitting)   Pulse (!) 54   Temp 97.8 °F (36.6 °C) (Temporal)   Resp 16   Ht 5' 6\" (1.676 m)   Wt 110 lb 3.2 oz (50 kg)   LMP  (LMP Unknown)   SpO2 100%   BMI 17.79 kg/m²       Physical Exam  Vitals and nursing note reviewed. Constitutional:       General: She is not in acute distress. Appearance: She is underweight. She is not diaphoretic. HENT:      Right Ear: External ear normal.      Left Ear: External ear normal.   Eyes:      General: No scleral icterus. Right eye: No discharge. Left eye: No discharge. Extraocular Movements: Extraocular movements intact. Conjunctiva/sclera: Conjunctivae normal.   Cardiovascular:      Rate and Rhythm: Normal rate and regular rhythm. Pulmonary:      Effort: Pulmonary effort is normal.      Breath sounds: Normal breath sounds. No wheezing. Abdominal:      General: Bowel sounds are normal.      Palpations: Abdomen is soft. Tenderness: There is no abdominal tenderness. Musculoskeletal:      Cervical back: Normal range of motion and neck supple. Lymphadenopathy:      Cervical: No cervical adenopathy. Neurological:      Mental Status: She is alert and oriented to person, place, and time.    Psychiatric:         Mood and Affect: Mood and affect normal.         I, Topher Mcgrath MD, personally performed the services described in this documentation as scribed by Warren Martinez in my presence, and it is both accurate and complete. An electronic signature was used to authenticate this note.   -- Warren Martinez

## 2023-03-04 LAB
ALBUMIN SERPL-MCNC: 4 G/DL (ref 3.5–5)
ALBUMIN/GLOB SERPL: 1.4 (ref 1.1–2.2)
ALP SERPL-CCNC: 85 U/L (ref 45–117)
ALT SERPL-CCNC: 23 U/L (ref 12–78)
ANION GAP SERPL CALC-SCNC: 6 MMOL/L (ref 5–15)
AST SERPL-CCNC: 19 U/L (ref 15–37)
BILIRUB SERPL-MCNC: 0.5 MG/DL (ref 0.2–1)
BUN SERPL-MCNC: 15 MG/DL (ref 6–20)
BUN/CREAT SERPL: 20 (ref 12–20)
CALCIUM SERPL-MCNC: 9.5 MG/DL (ref 8.5–10.1)
CHLORIDE SERPL-SCNC: 108 MMOL/L (ref 97–108)
CHOLEST SERPL-MCNC: 218 MG/DL
CO2 SERPL-SCNC: 28 MMOL/L (ref 21–32)
CREAT SERPL-MCNC: 0.74 MG/DL (ref 0.55–1.02)
ERYTHROCYTE [DISTWIDTH] IN BLOOD BY AUTOMATED COUNT: 12 % (ref 11.5–14.5)
GLOBULIN SER CALC-MCNC: 2.8 G/DL (ref 2–4)
GLUCOSE SERPL-MCNC: 82 MG/DL (ref 65–100)
HCT VFR BLD AUTO: 39.8 % (ref 35–47)
HDLC SERPL-MCNC: 70 MG/DL
HDLC SERPL: 3.1 (ref 0–5)
HGB BLD-MCNC: 12.3 G/DL (ref 11.5–16)
LDLC SERPL CALC-MCNC: 138.4 MG/DL (ref 0–100)
MCH RBC QN AUTO: 31.7 PG (ref 26–34)
MCHC RBC AUTO-ENTMCNC: 30.9 G/DL (ref 30–36.5)
MCV RBC AUTO: 102.6 FL (ref 80–99)
NRBC # BLD: 0 K/UL (ref 0–0.01)
NRBC BLD-RTO: 0 PER 100 WBC
PLATELET # BLD AUTO: 262 K/UL (ref 150–400)
PMV BLD AUTO: 10.6 FL (ref 8.9–12.9)
POTASSIUM SERPL-SCNC: 5.3 MMOL/L (ref 3.5–5.1)
PROT SERPL-MCNC: 6.8 G/DL (ref 6.4–8.2)
RBC # BLD AUTO: 3.88 M/UL (ref 3.8–5.2)
SODIUM SERPL-SCNC: 142 MMOL/L (ref 136–145)
TRIGL SERPL-MCNC: 48 MG/DL (ref ?–150)
VLDLC SERPL CALC-MCNC: 9.6 MG/DL
WBC # BLD AUTO: 4.2 K/UL (ref 3.6–11)

## 2023-03-25 DIAGNOSIS — E78.5 HYPERLIPIDEMIA, UNSPECIFIED HYPERLIPIDEMIA TYPE: ICD-10-CM

## 2023-03-27 DIAGNOSIS — R51.9 FREQUENT HEADACHES: ICD-10-CM

## 2023-03-27 DIAGNOSIS — F51.01 PRIMARY INSOMNIA: ICD-10-CM

## 2023-03-27 RX ORDER — SIMVASTATIN 40 MG/1
40 TABLET, FILM COATED ORAL
Qty: 90 TABLET | Refills: 0 | Status: SHIPPED | OUTPATIENT
Start: 2023-03-27

## 2023-03-28 RX ORDER — AMITRIPTYLINE HYDROCHLORIDE 10 MG/1
10 TABLET, FILM COATED ORAL
Qty: 90 TABLET | Refills: 0 | Status: SHIPPED | OUTPATIENT
Start: 2023-03-28 | End: 2023-06-26

## 2023-04-28 DIAGNOSIS — A60.00 GENITAL HERPES SIMPLEX, UNSPECIFIED SITE: ICD-10-CM

## 2023-04-28 RX ORDER — VALACYCLOVIR HYDROCHLORIDE 500 MG/1
TABLET, FILM COATED ORAL
Qty: 90 TABLET | Refills: 1 | Status: SHIPPED | OUTPATIENT
Start: 2023-04-28

## 2023-04-29 DIAGNOSIS — I10 ESSENTIAL HYPERTENSION WITH GOAL BLOOD PRESSURE LESS THAN 140/90: ICD-10-CM

## 2023-04-29 RX ORDER — LISINOPRIL 20 MG/1
TABLET ORAL
Qty: 90 TABLET | Refills: 0 | Status: SHIPPED | OUTPATIENT
Start: 2023-04-29

## 2023-05-24 DIAGNOSIS — J30.89 OTHER ALLERGIC RHINITIS: ICD-10-CM

## 2023-05-24 RX ORDER — MONTELUKAST SODIUM 10 MG/1
10 TABLET ORAL NIGHTLY
Qty: 90 TABLET | Refills: 0 | Status: SHIPPED | OUTPATIENT
Start: 2023-05-24

## 2023-06-19 DIAGNOSIS — E78.5 HYPERLIPIDEMIA, UNSPECIFIED: ICD-10-CM

## 2023-06-19 RX ORDER — SIMVASTATIN 40 MG
40 TABLET ORAL NIGHTLY
Qty: 90 TABLET | Refills: 0 | Status: SHIPPED | OUTPATIENT
Start: 2023-06-19

## 2023-06-23 DIAGNOSIS — F51.01 PRIMARY INSOMNIA: ICD-10-CM

## 2023-06-23 DIAGNOSIS — R51.9 HEADACHE, UNSPECIFIED: ICD-10-CM

## 2023-06-25 RX ORDER — AMITRIPTYLINE HYDROCHLORIDE 10 MG/1
TABLET, FILM COATED ORAL
Qty: 90 TABLET | Refills: 0 | Status: SHIPPED | OUTPATIENT
Start: 2023-06-25

## 2023-07-02 DIAGNOSIS — J30.89 OTHER ALLERGIC RHINITIS: ICD-10-CM

## 2023-07-02 DIAGNOSIS — I10 ESSENTIAL (PRIMARY) HYPERTENSION: ICD-10-CM

## 2023-07-02 DIAGNOSIS — E78.5 HYPERLIPIDEMIA, UNSPECIFIED: ICD-10-CM

## 2023-07-02 RX ORDER — MONTELUKAST SODIUM 10 MG/1
10 TABLET ORAL NIGHTLY
Qty: 90 TABLET | Refills: 0 | Status: SHIPPED | OUTPATIENT
Start: 2023-07-02

## 2023-07-02 RX ORDER — LISINOPRIL 20 MG/1
TABLET ORAL
Qty: 90 TABLET | Refills: 0 | Status: SHIPPED | OUTPATIENT
Start: 2023-07-02

## 2023-07-02 RX ORDER — SIMVASTATIN 40 MG
TABLET ORAL
Qty: 90 TABLET | Refills: 0 | Status: SHIPPED | OUTPATIENT
Start: 2023-07-02

## 2023-07-05 ENCOUNTER — TELEPHONE (OUTPATIENT)
Dept: PRIMARY CARE CLINIC | Facility: CLINIC | Age: 68
End: 2023-07-05

## 2023-07-05 NOTE — TELEPHONE ENCOUNTER
Called pt and told her if the benadryl and hydrocortisone doesn't help to let us know by Friday and she said ok, thank you.

## 2023-07-05 NOTE — TELEPHONE ENCOUNTER
Looking for a good dermatologist. She did not know if Dr. Kenneth Richardson knew a good one. She has a rash on her arm. She made appointment for 1 pm on 7/12.

## 2023-08-29 ENCOUNTER — OFFICE VISIT (OUTPATIENT)
Dept: PRIMARY CARE CLINIC | Facility: CLINIC | Age: 68
End: 2023-08-29
Payer: COMMERCIAL

## 2023-08-29 VITALS
HEART RATE: 75 BPM | TEMPERATURE: 97.1 F | HEIGHT: 66 IN | RESPIRATION RATE: 16 BRPM | SYSTOLIC BLOOD PRESSURE: 135 MMHG | WEIGHT: 113.8 LBS | DIASTOLIC BLOOD PRESSURE: 80 MMHG | BODY MASS INDEX: 18.29 KG/M2 | OXYGEN SATURATION: 100 %

## 2023-08-29 DIAGNOSIS — M79.642 HAND PAIN, LEFT: ICD-10-CM

## 2023-08-29 DIAGNOSIS — I10 PRIMARY HYPERTENSION: Primary | ICD-10-CM

## 2023-08-29 DIAGNOSIS — G43.809 OTHER MIGRAINE WITHOUT STATUS MIGRAINOSUS, NOT INTRACTABLE: ICD-10-CM

## 2023-08-29 DIAGNOSIS — M85.89 OSTEOPENIA OF MULTIPLE SITES: ICD-10-CM

## 2023-08-29 DIAGNOSIS — M19.90 ARTHRITIS: ICD-10-CM

## 2023-08-29 PROBLEM — E55.9 VITAMIN D DEFICIENCY: Status: RESOLVED | Noted: 2018-01-16 | Resolved: 2023-08-29

## 2023-08-29 PROCEDURE — 3079F DIAST BP 80-89 MM HG: CPT | Performed by: INTERNAL MEDICINE

## 2023-08-29 PROCEDURE — 3075F SYST BP GE 130 - 139MM HG: CPT | Performed by: INTERNAL MEDICINE

## 2023-08-29 PROCEDURE — 99214 OFFICE O/P EST MOD 30 MIN: CPT | Performed by: INTERNAL MEDICINE

## 2023-08-29 PROCEDURE — 1123F ACP DISCUSS/DSCN MKR DOCD: CPT | Performed by: INTERNAL MEDICINE

## 2023-08-29 RX ORDER — MELOXICAM 15 MG/1
15 TABLET ORAL DAILY
Qty: 30 TABLET | Refills: 3 | Status: SHIPPED | OUTPATIENT
Start: 2023-08-29

## 2023-08-29 RX ORDER — AMITRIPTYLINE HYDROCHLORIDE 10 MG/1
10 TABLET, FILM COATED ORAL NIGHTLY
Qty: 90 TABLET | Refills: 1 | Status: SHIPPED | OUTPATIENT
Start: 2023-08-29

## 2023-08-29 RX ORDER — ALENDRONATE SODIUM 70 MG/1
70 TABLET ORAL
Qty: 12 TABLET | Refills: 1 | Status: SHIPPED | OUTPATIENT
Start: 2023-08-29 | End: 2024-02-07

## 2023-08-29 SDOH — ECONOMIC STABILITY: FOOD INSECURITY: WITHIN THE PAST 12 MONTHS, THE FOOD YOU BOUGHT JUST DIDN'T LAST AND YOU DIDN'T HAVE MONEY TO GET MORE.: PATIENT DECLINED

## 2023-08-29 SDOH — ECONOMIC STABILITY: HOUSING INSECURITY
IN THE LAST 12 MONTHS, WAS THERE A TIME WHEN YOU DID NOT HAVE A STEADY PLACE TO SLEEP OR SLEPT IN A SHELTER (INCLUDING NOW)?: NO

## 2023-08-29 SDOH — ECONOMIC STABILITY: INCOME INSECURITY: HOW HARD IS IT FOR YOU TO PAY FOR THE VERY BASICS LIKE FOOD, HOUSING, MEDICAL CARE, AND HEATING?: NOT HARD AT ALL

## 2023-08-29 SDOH — ECONOMIC STABILITY: FOOD INSECURITY: WITHIN THE PAST 12 MONTHS, YOU WORRIED THAT YOUR FOOD WOULD RUN OUT BEFORE YOU GOT MONEY TO BUY MORE.: NEVER TRUE

## 2023-08-29 ASSESSMENT — ENCOUNTER SYMPTOMS
RHINORRHEA: 0
COLOR CHANGE: 0
ABDOMINAL PAIN: 0
CONSTIPATION: 0
BACK PAIN: 0
EYE DISCHARGE: 0
SHORTNESS OF BREATH: 0
SORE THROAT: 0
COUGH: 0
CHEST TIGHTNESS: 0
DIARRHEA: 0

## 2023-08-29 ASSESSMENT — PATIENT HEALTH QUESTIONNAIRE - PHQ9
2. FEELING DOWN, DEPRESSED OR HOPELESS: 0
SUM OF ALL RESPONSES TO PHQ QUESTIONS 1-9: 0
SUM OF ALL RESPONSES TO PHQ9 QUESTIONS 1 & 2: 0
SUM OF ALL RESPONSES TO PHQ QUESTIONS 1-9: 0
1. LITTLE INTEREST OR PLEASURE IN DOING THINGS: 0
SUM OF ALL RESPONSES TO PHQ QUESTIONS 1-9: 0
SUM OF ALL RESPONSES TO PHQ QUESTIONS 1-9: 0

## 2023-08-29 NOTE — PROGRESS NOTES
John George (: 1955) is a 79 y.o. female, established patient, here for evaluation of the following chief complaint(s):  Follow-up, Hand Pain (For the past month and travels up her aim. In the morning she is stiff. ), and Dehydration (When she wakes up in the morning. )    ASSESSMENT/PLAN:  Below is the assessment and plan developed based on review of pertinent history, physical exam, labs, studies, and medications. 1. Primary hypertension  I recommend that she continues taking lisinopril 20 mg daily and keep checking readings at home. 2. Hand pain, left  -     meloxicam (MOBIC) 15 MG tablet; Take 1 tablet by mouth daily, Disp-30 tablet, R-3Normal sent to pharmacy. I prescribed meloxicam 15 mg to start taking daily. Potential side effects were discussed. 3. Other migraine without status migrainosus, not intractable  -     amitriptyline (ELAVIL) 10 MG tablet; Take 1 tablet by mouth nightly, Disp-90 tablet, R-1Normal sent to pharmacy. I refilled her amitriptyline 10 mg which she should take early than she usually takes it to reduce the sleepiness she feels the next day. 4. Osteopenia of multiple sites  -     alendronate (FOSAMAX) 70 MG tablet; Take 1 tablet by mouth every 7 days for 24 doses, Disp-12 tablet, R-1Normal sent to pharmacy. I prescribed Fosamax 70 mg to start taking weekly with food. Potential side effects were discussed. I recommend that patient take a Vitamin D supplement of 1,000 units daily and a calcium supplement of 500 mg three times during the week. 5. Arthritis  -     meloxicam (MOBIC) 15 MG tablet; Take 1 tablet by mouth daily, Disp-30 tablet, R-3Normal  I prescribed meloxicam 15 mg to start taking daily. Potential side effects were discussed. SUBJECTIVE/OBJECTIVE:  HPI  The patient presents today for follow up on chronic conditions, left hand pain, and dehydration.      Her BP is elevated today in office at 145/91, 135/80 on manual repeat in left arm

## 2023-09-08 ENCOUNTER — OFFICE VISIT (OUTPATIENT)
Age: 68
End: 2023-09-08

## 2023-09-08 VITALS
SYSTOLIC BLOOD PRESSURE: 177 MMHG | BODY MASS INDEX: 18 KG/M2 | OXYGEN SATURATION: 97 % | RESPIRATION RATE: 16 BRPM | DIASTOLIC BLOOD PRESSURE: 92 MMHG | HEART RATE: 63 BPM | WEIGHT: 112 LBS | HEIGHT: 66 IN | TEMPERATURE: 98.5 F

## 2023-09-08 DIAGNOSIS — R05.1 ACUTE COUGH: Primary | ICD-10-CM

## 2023-09-08 LAB
Lab: NORMAL
QC PASS/FAIL: NORMAL
SARS-COV-2 RDRP RESP QL NAA+PROBE: NEGATIVE

## 2023-09-08 RX ORDER — BENZONATATE 200 MG/1
200 CAPSULE ORAL 3 TIMES DAILY PRN
Qty: 21 CAPSULE | Refills: 0 | Status: SHIPPED | OUTPATIENT
Start: 2023-09-08 | End: 2023-09-15

## 2023-09-08 NOTE — PROGRESS NOTES
Subjective: (As above and below)     The patient/guardian gave verbal consent to treat. Chief Complaint   Patient presents with    Cough     Cough/sore throat/clear drainage X  3 days     Elena De La Cruz is a 79 y.o. female who presents for evaluation of : sore throat, cough, nasal congestion. Symptom onset 3 days ago . Preceding illness: none. No other identified aggravating or alleviating factors. Symptoms are constant and overall unchanged. Denies: SOB, vomiting/diarrhea, rashes, fevers . Review of Systems    Review of Systems - negative except as listed above    Reviewed PmHx, RxHx, FmHx, SocHx, AllgHx and updated in chart. Family History   Problem Relation Age of Onset    Cancer Other         maternal cousins (breast)    Kidney Disease Father     Uterine Cancer Maternal Grandmother         uterine    Colon Cancer Maternal Grandmother 79    Diabetes Paternal Aunt     Other Paternal Aunt         gout    Heart Attack Paternal Aunt         multiple    Diabetes Paternal Aunt     Stroke Paternal Aunt     Asthma Paternal Uncle         x 2    Breast Cancer Other 35        x 2.  bilaterally. Alzheimer's Disease Maternal Aunt         x 2    Other Paternal Grandmother         kyphosis due to dementia posturing    Dementia Paternal Grandmother     Cancer Maternal Grandfather         ? Other Paternal Grandfather         MVA    Heart Attack Maternal Uncle 76    Other Mother         gallstones    Breast Cancer Mother 62        unilateral.    Hypertension Father     Stroke Father 70        x2 CVAs and several TIAs    Cancer Maternal Aunt         lung       Past Medical History:   Diagnosis Date    Allergic rhinitis, cause unspecified     Arthritis     fingers and toes    Breast mass 06/1999    Right. Benign. due to Prempro. Dr. Bijal García    Chickenpox childhood    Dizziness 10/2011    Dr. Brice Hines. EBV positive mononucleosis syndrome 10/2010    positive titer.     Exposure to hepatitis B

## 2023-09-20 ENCOUNTER — HOSPITAL ENCOUNTER (OUTPATIENT)
Facility: HOSPITAL | Age: 68
Discharge: HOME | End: 2023-09-23
Attending: INTERNAL MEDICINE
Payer: COMMERCIAL

## 2023-09-20 ENCOUNTER — OFFICE VISIT (OUTPATIENT)
Dept: PRIMARY CARE CLINIC | Facility: CLINIC | Age: 68
End: 2023-09-20
Payer: COMMERCIAL

## 2023-09-20 VITALS
HEIGHT: 66 IN | BODY MASS INDEX: 18.09 KG/M2 | OXYGEN SATURATION: 100 % | HEART RATE: 68 BPM | SYSTOLIC BLOOD PRESSURE: 132 MMHG | DIASTOLIC BLOOD PRESSURE: 80 MMHG | TEMPERATURE: 97.5 F | RESPIRATION RATE: 16 BRPM | WEIGHT: 112.6 LBS

## 2023-09-20 DIAGNOSIS — J45.909 ASTHMA DUE TO ENVIRONMENTAL ALLERGIES: ICD-10-CM

## 2023-09-20 DIAGNOSIS — I10 PRIMARY HYPERTENSION: Primary | ICD-10-CM

## 2023-09-20 DIAGNOSIS — R05.8 COUGH PRESENT FOR GREATER THAN 3 WEEKS: ICD-10-CM

## 2023-09-20 DIAGNOSIS — S99.912A INJURY OF LEFT ANKLE, INITIAL ENCOUNTER: ICD-10-CM

## 2023-09-20 DIAGNOSIS — R22.42 LOCALIZED SWELLING OF LEFT FOOT: ICD-10-CM

## 2023-09-20 PROCEDURE — 71046 X-RAY EXAM CHEST 2 VIEWS: CPT

## 2023-09-20 PROCEDURE — 1123F ACP DISCUSS/DSCN MKR DOCD: CPT | Performed by: INTERNAL MEDICINE

## 2023-09-20 PROCEDURE — 3075F SYST BP GE 130 - 139MM HG: CPT | Performed by: INTERNAL MEDICINE

## 2023-09-20 PROCEDURE — 3079F DIAST BP 80-89 MM HG: CPT | Performed by: INTERNAL MEDICINE

## 2023-09-20 PROCEDURE — 99214 OFFICE O/P EST MOD 30 MIN: CPT | Performed by: INTERNAL MEDICINE

## 2023-09-20 RX ORDER — FLUTICASONE PROPIONATE AND SALMETEROL 250; 50 UG/1; UG/1
1 POWDER RESPIRATORY (INHALATION) EVERY 12 HOURS
Qty: 60 EACH | Refills: 3 | Status: SHIPPED | OUTPATIENT
Start: 2023-09-20

## 2023-09-20 RX ORDER — BENZONATATE 200 MG/1
200 CAPSULE ORAL 3 TIMES DAILY PRN
Qty: 21 CAPSULE | Refills: 0 | Status: SHIPPED | OUTPATIENT
Start: 2023-09-20 | End: 2023-09-27

## 2023-09-20 ASSESSMENT — ENCOUNTER SYMPTOMS
CHEST TIGHTNESS: 0
RHINORRHEA: 0
DIARRHEA: 0
COLOR CHANGE: 0
CONSTIPATION: 0
SHORTNESS OF BREATH: 0
ABDOMINAL PAIN: 0
BACK PAIN: 0
SORE THROAT: 0
EYE DISCHARGE: 0
COUGH: 1

## 2023-09-20 ASSESSMENT — PATIENT HEALTH QUESTIONNAIRE - PHQ9
1. LITTLE INTEREST OR PLEASURE IN DOING THINGS: 0
SUM OF ALL RESPONSES TO PHQ QUESTIONS 1-9: 0
SUM OF ALL RESPONSES TO PHQ9 QUESTIONS 1 & 2: 0
2. FEELING DOWN, DEPRESSED OR HOPELESS: 0
SUM OF ALL RESPONSES TO PHQ QUESTIONS 1-9: 0

## 2023-09-22 ENCOUNTER — TELEPHONE (OUTPATIENT)
Dept: PRIMARY CARE CLINIC | Facility: CLINIC | Age: 68
End: 2023-09-22

## 2023-09-22 NOTE — TELEPHONE ENCOUNTER
Patient called to get a PA started on the Diclofenac Gel 1%. She said her pharmacy told her to contact [address] to get the PA started.   Patient would like a call back to let her know at Ph# [phone]

## 2023-09-25 ENCOUNTER — TELEPHONE (OUTPATIENT)
Dept: PRIMARY CARE CLINIC | Facility: CLINIC | Age: 68
End: 2023-09-25

## 2023-09-25 NOTE — TELEPHONE ENCOUNTER
PA denied for diclofenac. Plan states - The policy states the requested drug may be covered when it is used for  osteoarthritis pain in joints susceptible to topical treatment, such as feet, ankles, knees, hands, wrists or elbows.

## 2023-10-17 DIAGNOSIS — I10 ESSENTIAL (PRIMARY) HYPERTENSION: ICD-10-CM

## 2023-10-17 RX ORDER — LISINOPRIL 20 MG/1
TABLET ORAL
Qty: 90 TABLET | Refills: 0 | Status: SHIPPED | OUTPATIENT
Start: 2023-10-17

## 2023-10-21 DIAGNOSIS — A60.00 HERPESVIRAL INFECTION OF UROGENITAL SYSTEM, UNSPECIFIED: ICD-10-CM

## 2023-10-23 RX ORDER — VALACYCLOVIR HYDROCHLORIDE 500 MG/1
TABLET, FILM COATED ORAL
Qty: 90 TABLET | Refills: 1 | Status: SHIPPED | OUTPATIENT
Start: 2023-10-23

## 2023-10-23 NOTE — TELEPHONE ENCOUNTER
PCP: Yasmin Berkowitz MD    Last Visit 9/20/2023   No future appointments.     Requested Prescriptions     Pending Prescriptions Disp Refills    valACYclovir (VALTREX) 500 MG tablet [Pharmacy Med Name: VALACYCLOVIR  MG TABLET] 90 tablet 1     Sig: TAKE 1 TABLET BY MOUTH EVERY DAY         Other Comments: Last Refill 11/04/2022

## 2023-11-09 DIAGNOSIS — J30.89 OTHER ALLERGIC RHINITIS: ICD-10-CM

## 2023-11-09 RX ORDER — MONTELUKAST SODIUM 10 MG/1
10 TABLET ORAL
Qty: 90 TABLET | Refills: 0 | Status: SHIPPED | OUTPATIENT
Start: 2023-11-09

## 2023-11-16 DIAGNOSIS — J45.909 ASTHMA DUE TO ENVIRONMENTAL ALLERGIES: ICD-10-CM

## 2023-11-16 RX ORDER — FLUTICASONE PROPIONATE AND SALMETEROL 250; 50 UG/1; UG/1
1 POWDER RESPIRATORY (INHALATION) EVERY 12 HOURS
Qty: 60 EACH | Refills: 3 | Status: SHIPPED | OUTPATIENT
Start: 2023-11-16

## 2023-12-04 DIAGNOSIS — E78.5 HYPERLIPIDEMIA, UNSPECIFIED: ICD-10-CM

## 2023-12-04 RX ORDER — SIMVASTATIN 40 MG
TABLET ORAL
Qty: 90 TABLET | Refills: 0 | Status: SHIPPED | OUTPATIENT
Start: 2023-12-04

## 2023-12-24 DIAGNOSIS — M19.90 ARTHRITIS: ICD-10-CM

## 2023-12-24 DIAGNOSIS — M79.642 HAND PAIN, LEFT: ICD-10-CM

## 2023-12-26 RX ORDER — MELOXICAM 15 MG/1
15 TABLET ORAL DAILY
Qty: 30 TABLET | Refills: 0 | Status: SHIPPED | OUTPATIENT
Start: 2023-12-26

## 2024-01-14 DIAGNOSIS — I10 ESSENTIAL (PRIMARY) HYPERTENSION: ICD-10-CM

## 2024-01-15 RX ORDER — LISINOPRIL 20 MG/1
TABLET ORAL
Qty: 90 TABLET | Refills: 0 | Status: SHIPPED | OUTPATIENT
Start: 2024-01-15

## 2024-01-24 DIAGNOSIS — M19.90 ARTHRITIS: ICD-10-CM

## 2024-01-24 DIAGNOSIS — M79.642 HAND PAIN, LEFT: ICD-10-CM

## 2024-01-24 RX ORDER — MELOXICAM 15 MG/1
15 TABLET ORAL DAILY
Qty: 30 TABLET | Refills: 0 | Status: SHIPPED | OUTPATIENT
Start: 2024-01-24

## 2024-02-06 DIAGNOSIS — J30.89 OTHER ALLERGIC RHINITIS: ICD-10-CM

## 2024-02-06 RX ORDER — MONTELUKAST SODIUM 10 MG/1
10 TABLET ORAL
Qty: 90 TABLET | Refills: 0 | Status: SHIPPED | OUTPATIENT
Start: 2024-02-06

## 2024-02-08 DIAGNOSIS — M85.89 OSTEOPENIA OF MULTIPLE SITES: ICD-10-CM

## 2024-02-08 RX ORDER — ALENDRONATE SODIUM 70 MG/1
70 TABLET ORAL
Qty: 12 TABLET | Refills: 1 | Status: SHIPPED | OUTPATIENT
Start: 2024-02-08 | End: 2024-07-19

## 2024-02-21 DIAGNOSIS — M79.642 HAND PAIN, LEFT: ICD-10-CM

## 2024-02-21 DIAGNOSIS — M19.90 ARTHRITIS: ICD-10-CM

## 2024-02-21 RX ORDER — MELOXICAM 15 MG/1
15 TABLET ORAL DAILY
Qty: 30 TABLET | Refills: 0 | Status: SHIPPED | OUTPATIENT
Start: 2024-02-21

## 2024-02-25 DIAGNOSIS — A60.00 HERPESVIRAL INFECTION OF UROGENITAL SYSTEM, UNSPECIFIED: ICD-10-CM

## 2024-02-25 RX ORDER — VALACYCLOVIR HYDROCHLORIDE 500 MG/1
TABLET, FILM COATED ORAL
Qty: 90 TABLET | Refills: 1 | Status: SHIPPED | OUTPATIENT
Start: 2024-02-25

## 2024-03-05 DIAGNOSIS — E78.5 HYPERLIPIDEMIA, UNSPECIFIED: ICD-10-CM

## 2024-03-05 RX ORDER — SIMVASTATIN 40 MG
TABLET ORAL
Qty: 90 TABLET | Refills: 0 | Status: SHIPPED | OUTPATIENT
Start: 2024-03-05

## 2024-03-19 ENCOUNTER — OFFICE VISIT (OUTPATIENT)
Dept: PRIMARY CARE CLINIC | Facility: CLINIC | Age: 69
End: 2024-03-19
Payer: COMMERCIAL

## 2024-03-19 VITALS
TEMPERATURE: 97.1 F | HEART RATE: 62 BPM | BODY MASS INDEX: 18 KG/M2 | HEIGHT: 66 IN | DIASTOLIC BLOOD PRESSURE: 80 MMHG | WEIGHT: 112 LBS | RESPIRATION RATE: 16 BRPM | SYSTOLIC BLOOD PRESSURE: 128 MMHG | OXYGEN SATURATION: 99 %

## 2024-03-19 DIAGNOSIS — M85.80 OSTEOPENIA AFTER MENOPAUSE: ICD-10-CM

## 2024-03-19 DIAGNOSIS — F43.21 GRIEF REACTION: ICD-10-CM

## 2024-03-19 DIAGNOSIS — E78.2 HYPERLIPIDEMIA, MIXED: ICD-10-CM

## 2024-03-19 DIAGNOSIS — F51.02 INSOMNIA DUE TO STRESS: ICD-10-CM

## 2024-03-19 DIAGNOSIS — Z00.00 PHYSICAL EXAM: Primary | ICD-10-CM

## 2024-03-19 DIAGNOSIS — S92.902D: ICD-10-CM

## 2024-03-19 DIAGNOSIS — Z00.00 PHYSICAL EXAM: ICD-10-CM

## 2024-03-19 DIAGNOSIS — I10 HYPERTENSION, ESSENTIAL: ICD-10-CM

## 2024-03-19 DIAGNOSIS — Z78.0 OSTEOPENIA AFTER MENOPAUSE: ICD-10-CM

## 2024-03-19 LAB
ALBUMIN SERPL-MCNC: 4.2 G/DL (ref 3.5–5)
ALBUMIN/GLOB SERPL: 1.3 (ref 1.1–2.2)
ALP SERPL-CCNC: 76 U/L (ref 45–117)
ALT SERPL-CCNC: 19 U/L (ref 12–78)
ANION GAP SERPL CALC-SCNC: 3 MMOL/L (ref 5–15)
AST SERPL-CCNC: 16 U/L (ref 15–37)
BILIRUB SERPL-MCNC: 0.8 MG/DL (ref 0.2–1)
BUN SERPL-MCNC: 15 MG/DL (ref 6–20)
BUN/CREAT SERPL: 18 (ref 12–20)
CALCIUM SERPL-MCNC: 9.7 MG/DL (ref 8.5–10.1)
CHLORIDE SERPL-SCNC: 108 MMOL/L (ref 97–108)
CHOLEST SERPL-MCNC: 215 MG/DL
CO2 SERPL-SCNC: 29 MMOL/L (ref 21–32)
CREAT SERPL-MCNC: 0.84 MG/DL (ref 0.55–1.02)
ERYTHROCYTE [DISTWIDTH] IN BLOOD BY AUTOMATED COUNT: 12.3 % (ref 11.5–14.5)
GLOBULIN SER CALC-MCNC: 3.3 G/DL (ref 2–4)
GLUCOSE SERPL-MCNC: 83 MG/DL (ref 65–100)
HCT VFR BLD AUTO: 41.6 % (ref 35–47)
HDLC SERPL-MCNC: 79 MG/DL
HDLC SERPL: 2.7 (ref 0–5)
HGB BLD-MCNC: 13.4 G/DL (ref 11.5–16)
LDLC SERPL CALC-MCNC: 123.2 MG/DL (ref 0–100)
MCH RBC QN AUTO: 32.4 PG (ref 26–34)
MCHC RBC AUTO-ENTMCNC: 32.2 G/DL (ref 30–36.5)
MCV RBC AUTO: 100.7 FL (ref 80–99)
NRBC # BLD: 0 K/UL (ref 0–0.01)
NRBC BLD-RTO: 0 PER 100 WBC
PLATELET # BLD AUTO: 291 K/UL (ref 150–400)
PMV BLD AUTO: 9.5 FL (ref 8.9–12.9)
POTASSIUM SERPL-SCNC: 5.2 MMOL/L (ref 3.5–5.1)
PROT SERPL-MCNC: 7.5 G/DL (ref 6.4–8.2)
RBC # BLD AUTO: 4.13 M/UL (ref 3.8–5.2)
SODIUM SERPL-SCNC: 140 MMOL/L (ref 136–145)
TRIGL SERPL-MCNC: 64 MG/DL
TSH SERPL DL<=0.05 MIU/L-ACNC: 1.04 UIU/ML (ref 0.36–3.74)
VLDLC SERPL CALC-MCNC: 12.8 MG/DL
WBC # BLD AUTO: 4.2 K/UL (ref 3.6–11)

## 2024-03-19 PROCEDURE — 3078F DIAST BP <80 MM HG: CPT | Performed by: INTERNAL MEDICINE

## 2024-03-19 PROCEDURE — 99397 PER PM REEVAL EST PAT 65+ YR: CPT | Performed by: INTERNAL MEDICINE

## 2024-03-19 PROCEDURE — 3074F SYST BP LT 130 MM HG: CPT | Performed by: INTERNAL MEDICINE

## 2024-03-19 RX ORDER — AMITRIPTYLINE HYDROCHLORIDE 10 MG/1
10 TABLET, FILM COATED ORAL NIGHTLY
Qty: 90 TABLET | Refills: 0 | Status: SHIPPED | OUTPATIENT
Start: 2024-03-19

## 2024-03-19 ASSESSMENT — ENCOUNTER SYMPTOMS
DIARRHEA: 0
SHORTNESS OF BREATH: 0
CONSTIPATION: 0
COLOR CHANGE: 0
COUGH: 0
ABDOMINAL PAIN: 0
CHEST TIGHTNESS: 0
SORE THROAT: 0
BACK PAIN: 0
EYE DISCHARGE: 0

## 2024-03-19 ASSESSMENT — PATIENT HEALTH QUESTIONNAIRE - PHQ9
1. LITTLE INTEREST OR PLEASURE IN DOING THINGS: NOT AT ALL
SUM OF ALL RESPONSES TO PHQ QUESTIONS 1-9: 0
2. FEELING DOWN, DEPRESSED OR HOPELESS: NOT AT ALL
SUM OF ALL RESPONSES TO PHQ9 QUESTIONS 1 & 2: 0

## 2024-03-19 NOTE — PROGRESS NOTES
\"Have you been to the ER, urgent care clinic since your last visit?  Hospitalized since your last visit?\"    NO      “Have you seen or consulted any other health care providers outside of [facility] since your last visit?”    Ortho - fractured left foot. Has a follow up jamie with Ortho tomorrow.   Urgent Cares    Date of last Mammogram: 12/12/2022       Chief Complaint   Patient presents with    Annual Exam     Labs, is fasting.     Pt says she uses Flonase nasal spray. I told her not to use that as it has been recalled per Dr. Salas.     Pt is ok with scribe.     Depression: Not at risk (3/19/2024)    PHQ-2     PHQ-2 Score: 0      
clear.   Eyes:      General:         Right eye: No discharge.         Left eye: No discharge.      Extraocular Movements: Extraocular movements intact.      Conjunctiva/sclera: Conjunctivae normal.   Cardiovascular:      Rate and Rhythm: Normal rate and regular rhythm.      Pulses: Normal pulses.   Pulmonary:      Effort: Pulmonary effort is normal.      Breath sounds: No wheezing.   Abdominal:      General: Bowel sounds are normal. There is no distension.      Palpations: Abdomen is soft.      Tenderness: There is no abdominal tenderness.   Musculoskeletal:      Right lower leg: No edema.      Left lower leg: No edema.   Lymphadenopathy:      Cervical: No cervical adenopathy.   Psychiatric:         Mood and Affect: Mood normal.            JA SNOW MA, am scribing for and in the presence of Halie Salas MD. 3/19/24/11:42 AM   Halie SNOW MD, personally performed the services described by my scribe in my presence, and it is both accurate and complete.      An electronic signature was used to authenticate this note.    --JA PIERSON MA

## 2024-04-14 DIAGNOSIS — I10 ESSENTIAL (PRIMARY) HYPERTENSION: ICD-10-CM

## 2024-04-14 RX ORDER — LISINOPRIL 20 MG/1
TABLET ORAL
Qty: 90 TABLET | Refills: 0 | Status: SHIPPED | OUTPATIENT
Start: 2024-04-14

## 2024-04-23 ENCOUNTER — HOSPITAL ENCOUNTER (OUTPATIENT)
Age: 69
Discharge: HOME | End: 2024-04-26
Payer: COMMERCIAL

## 2024-04-23 DIAGNOSIS — Z78.0 OSTEOPENIA AFTER MENOPAUSE: ICD-10-CM

## 2024-04-23 DIAGNOSIS — M85.80 OSTEOPENIA AFTER MENOPAUSE: ICD-10-CM

## 2024-04-23 PROCEDURE — 77080 DXA BONE DENSITY AXIAL: CPT

## 2024-06-02 DIAGNOSIS — E78.5 HYPERLIPIDEMIA, UNSPECIFIED: ICD-10-CM

## 2024-06-02 RX ORDER — SIMVASTATIN 40 MG
TABLET ORAL
Qty: 90 TABLET | Refills: 0 | Status: SHIPPED | OUTPATIENT
Start: 2024-06-02

## 2024-06-16 DIAGNOSIS — F51.02 INSOMNIA DUE TO STRESS: ICD-10-CM

## 2024-06-17 RX ORDER — AMITRIPTYLINE HYDROCHLORIDE 10 MG/1
10 TABLET, FILM COATED ORAL NIGHTLY
Qty: 30 TABLET | Refills: 0 | Status: SHIPPED | OUTPATIENT
Start: 2024-06-17

## 2024-07-11 DIAGNOSIS — F51.02 INSOMNIA DUE TO STRESS: ICD-10-CM

## 2024-07-11 RX ORDER — AMITRIPTYLINE HYDROCHLORIDE 10 MG/1
10 TABLET, FILM COATED ORAL NIGHTLY
Qty: 90 TABLET | Refills: 0 | Status: SHIPPED | OUTPATIENT
Start: 2024-07-11

## 2024-07-15 DIAGNOSIS — I10 ESSENTIAL (PRIMARY) HYPERTENSION: ICD-10-CM

## 2024-07-15 RX ORDER — LISINOPRIL 20 MG/1
TABLET ORAL
Qty: 90 TABLET | Refills: 0 | Status: SHIPPED | OUTPATIENT
Start: 2024-07-15

## 2024-09-01 DIAGNOSIS — E78.5 HYPERLIPIDEMIA, UNSPECIFIED: ICD-10-CM

## 2024-09-02 RX ORDER — SIMVASTATIN 40 MG
TABLET ORAL
Qty: 90 TABLET | Refills: 0 | Status: SHIPPED | OUTPATIENT
Start: 2024-09-02

## 2024-09-17 ENCOUNTER — OFFICE VISIT (OUTPATIENT)
Dept: PRIMARY CARE CLINIC | Facility: CLINIC | Age: 69
End: 2024-09-17
Payer: COMMERCIAL

## 2024-09-17 VITALS
SYSTOLIC BLOOD PRESSURE: 127 MMHG | BODY MASS INDEX: 18.48 KG/M2 | TEMPERATURE: 97 F | RESPIRATION RATE: 18 BRPM | WEIGHT: 115 LBS | HEART RATE: 76 BPM | HEIGHT: 66 IN | OXYGEN SATURATION: 99 % | DIASTOLIC BLOOD PRESSURE: 82 MMHG

## 2024-09-17 DIAGNOSIS — E78.2 COMBINED HYPERLIPIDEMIA: ICD-10-CM

## 2024-09-17 DIAGNOSIS — Z23 ENCOUNTER FOR ADMINISTRATION OF VACCINE: ICD-10-CM

## 2024-09-17 DIAGNOSIS — I10 ESSENTIAL (PRIMARY) HYPERTENSION: ICD-10-CM

## 2024-09-17 DIAGNOSIS — G47.62 NOCTURNAL LEG CRAMPS: ICD-10-CM

## 2024-09-17 DIAGNOSIS — R06.83 SNORES: ICD-10-CM

## 2024-09-17 DIAGNOSIS — R51.9 FREQUENT HEADACHES: Primary | ICD-10-CM

## 2024-09-17 DIAGNOSIS — L30.8 OTHER ECZEMA: ICD-10-CM

## 2024-09-17 LAB
ALBUMIN SERPL-MCNC: 4.2 G/DL (ref 3.5–5)
ALBUMIN/GLOB SERPL: 1.4 (ref 1.1–2.2)
ALP SERPL-CCNC: 91 U/L (ref 45–117)
ALT SERPL-CCNC: 18 U/L (ref 12–78)
ANION GAP SERPL CALC-SCNC: 2 MMOL/L (ref 2–12)
AST SERPL-CCNC: 17 U/L (ref 15–37)
BILIRUB SERPL-MCNC: 0.7 MG/DL (ref 0.2–1)
BUN SERPL-MCNC: 16 MG/DL (ref 6–20)
BUN/CREAT SERPL: 18 (ref 12–20)
CALCIUM SERPL-MCNC: 9.6 MG/DL (ref 8.5–10.1)
CHLORIDE SERPL-SCNC: 107 MMOL/L (ref 97–108)
CHOLEST SERPL-MCNC: 194 MG/DL
CO2 SERPL-SCNC: 30 MMOL/L (ref 21–32)
CREAT SERPL-MCNC: 0.87 MG/DL (ref 0.55–1.02)
GLOBULIN SER CALC-MCNC: 3 G/DL (ref 2–4)
GLUCOSE SERPL-MCNC: 87 MG/DL (ref 65–100)
HDLC SERPL-MCNC: 65 MG/DL
HDLC SERPL: 3 (ref 0–5)
LDLC SERPL CALC-MCNC: 115.8 MG/DL (ref 0–100)
POTASSIUM SERPL-SCNC: 5.3 MMOL/L (ref 3.5–5.1)
PROT SERPL-MCNC: 7.2 G/DL (ref 6.4–8.2)
SODIUM SERPL-SCNC: 139 MMOL/L (ref 136–145)
TRIGL SERPL-MCNC: 66 MG/DL
VLDLC SERPL CALC-MCNC: 13.2 MG/DL

## 2024-09-17 PROCEDURE — 99214 OFFICE O/P EST MOD 30 MIN: CPT | Performed by: INTERNAL MEDICINE

## 2024-09-17 PROCEDURE — 90653 IIV ADJUVANT VACCINE IM: CPT | Performed by: INTERNAL MEDICINE

## 2024-09-17 PROCEDURE — 90471 IMMUNIZATION ADMIN: CPT | Performed by: INTERNAL MEDICINE

## 2024-09-17 PROCEDURE — 1123F ACP DISCUSS/DSCN MKR DOCD: CPT | Performed by: INTERNAL MEDICINE

## 2024-09-17 PROCEDURE — 3074F SYST BP LT 130 MM HG: CPT | Performed by: INTERNAL MEDICINE

## 2024-09-17 PROCEDURE — 3079F DIAST BP 80-89 MM HG: CPT | Performed by: INTERNAL MEDICINE

## 2024-09-17 RX ORDER — SIMVASTATIN 40 MG
40 TABLET ORAL NIGHTLY
Qty: 90 TABLET | Refills: 0 | Status: SHIPPED | OUTPATIENT
Start: 2024-09-17

## 2024-09-17 RX ORDER — UBIDECARENONE 75 MG
CAPSULE ORAL
Qty: 90 CAPSULE | Refills: 0 | Status: SHIPPED | OUTPATIENT
Start: 2024-09-17

## 2024-09-17 RX ORDER — LISINOPRIL 20 MG/1
20 TABLET ORAL DAILY
Qty: 90 TABLET | Refills: 0 | Status: SHIPPED | OUTPATIENT
Start: 2024-09-17

## 2024-09-17 SDOH — ECONOMIC STABILITY: FOOD INSECURITY: WITHIN THE PAST 12 MONTHS, YOU WORRIED THAT YOUR FOOD WOULD RUN OUT BEFORE YOU GOT MONEY TO BUY MORE.: NEVER TRUE

## 2024-09-17 SDOH — ECONOMIC STABILITY: INCOME INSECURITY: HOW HARD IS IT FOR YOU TO PAY FOR THE VERY BASICS LIKE FOOD, HOUSING, MEDICAL CARE, AND HEATING?: NOT HARD AT ALL

## 2024-09-17 SDOH — ECONOMIC STABILITY: FOOD INSECURITY: WITHIN THE PAST 12 MONTHS, THE FOOD YOU BOUGHT JUST DIDN'T LAST AND YOU DIDN'T HAVE MONEY TO GET MORE.: NEVER TRUE

## 2024-09-17 ASSESSMENT — PATIENT HEALTH QUESTIONNAIRE - PHQ9
2. FEELING DOWN, DEPRESSED OR HOPELESS: NOT AT ALL
SUM OF ALL RESPONSES TO PHQ QUESTIONS 1-9: 0
SUM OF ALL RESPONSES TO PHQ9 QUESTIONS 1 & 2: 0
1. LITTLE INTEREST OR PLEASURE IN DOING THINGS: NOT AT ALL
SUM OF ALL RESPONSES TO PHQ QUESTIONS 1-9: 0

## 2024-11-09 DIAGNOSIS — A60.00 HERPESVIRAL INFECTION OF UROGENITAL SYSTEM, UNSPECIFIED: ICD-10-CM

## 2024-11-10 RX ORDER — VALACYCLOVIR HYDROCHLORIDE 500 MG/1
TABLET, FILM COATED ORAL
Qty: 90 TABLET | Refills: 1 | Status: SHIPPED | OUTPATIENT
Start: 2024-11-10

## 2025-01-05 DIAGNOSIS — I10 ESSENTIAL (PRIMARY) HYPERTENSION: ICD-10-CM

## 2025-01-07 RX ORDER — LISINOPRIL 20 MG/1
20 TABLET ORAL DAILY
Qty: 30 TABLET | Refills: 0 | Status: SHIPPED | OUTPATIENT
Start: 2025-01-07 | End: 2025-02-03

## 2025-01-07 NOTE — TELEPHONE ENCOUNTER
Notified of drug interaction between lisinopril and meloxicam. Per chart review, patient reports not taking meloxicam. Lisinopril refilled.

## 2025-01-15 ENCOUNTER — OFFICE VISIT (OUTPATIENT)
Age: 70
End: 2025-01-15

## 2025-01-15 VITALS
TEMPERATURE: 98.4 F | HEART RATE: 76 BPM | RESPIRATION RATE: 16 BRPM | DIASTOLIC BLOOD PRESSURE: 81 MMHG | SYSTOLIC BLOOD PRESSURE: 153 MMHG | OXYGEN SATURATION: 94 % | BODY MASS INDEX: 18.01 KG/M2 | WEIGHT: 111.6 LBS

## 2025-01-15 DIAGNOSIS — J06.9 UPPER RESPIRATORY INFECTION WITH COUGH AND CONGESTION: Primary | ICD-10-CM

## 2025-01-15 DIAGNOSIS — R05.1 ACUTE COUGH: ICD-10-CM

## 2025-01-15 LAB
INFLUENZA A ANTIGEN, POC: ABNORMAL
INFLUENZA B ANTIGEN, POC: ABNORMAL
Lab: NORMAL
PERFORMING INSTRUMENT: NORMAL
QC PASS/FAIL: NORMAL
SARS-COV-2, POC: NORMAL

## 2025-01-15 RX ORDER — BENZONATATE 200 MG/1
200 CAPSULE ORAL 3 TIMES DAILY PRN
Qty: 30 CAPSULE | Refills: 0 | Status: SHIPPED | OUTPATIENT
Start: 2025-01-15 | End: 2025-01-25

## 2025-01-15 NOTE — PROGRESS NOTES
Patient Name: Lulu Serrano   YOB: 1955   Patient Status: Established patient,   Chief Complaint: Cold Symptoms (Chills, body aches, cough, headaches, x three days )      ____________________________________________________________________________________________    External Records Reviewed: None    Limitation to History: None    Outside Historian: None    SUBJECTIVE/OBJECTIVE:  Lulu Serrano is a 69 y.o. female presents with complaint of cough, body aches, headaches, congestion, runny nose and chills.  Symptoms began 3 day(s) ago and show no change since onset.  She reports exposure to Flu A last week. The patient denies fever, shortness of breath, and chest pain.  Patient reports taking OTC cough suppressant for symptoms without relief. No other acute symptoms reported at this time.          PAST MEDICAL HISTORY:   Medical: Pt  has a past medical history of Allergic rhinitis, cause unspecified, Arthritis, Breast mass (06/1999), Chickenpox (childhood), Dizziness (10/2011), EBV positive mononucleosis syndrome (10/2010), Exposure to hepatitis B, Genital HSV, Head injury, closed, with concussion (10/2018), Heart palpitations (05/21/09, 01/2018), Hypertension, Hypoglycemia, Menopausal and postmenopausal disorder (1999), Migraine (1981), MRSA infection (12/2011), Osteopenia (03/2019), Other and unspecified hyperlipidemia, Raynaud phenomenon, RLS (restless legs syndrome) (2008), Shingles (teenager), and Vitamin D deficiency (2008).  Surgical: Pt  has a past surgical history that includes Colonoscopy (09/19/2016); orthopedic surgery (Right, 10/2012); and Breast lumpectomy (Right, 1999).  Family: Pt family history includes Alzheimer's Disease in her maternal aunt; Asthma in her paternal uncle; Breast Cancer (age of onset: 35) in an other family member; Breast Cancer (age of onset: 58) in her mother; Cancer in her maternal aunt, maternal grandfather, and another family member; Colon Cancer (age of

## 2025-02-02 DIAGNOSIS — I10 ESSENTIAL (PRIMARY) HYPERTENSION: ICD-10-CM

## 2025-02-03 RX ORDER — LISINOPRIL 20 MG/1
20 TABLET ORAL DAILY
Qty: 90 TABLET | Refills: 1 | Status: SHIPPED | OUTPATIENT
Start: 2025-02-03

## 2025-02-05 ENCOUNTER — OFFICE VISIT (OUTPATIENT)
Age: 70
End: 2025-02-05
Payer: COMMERCIAL

## 2025-02-05 VITALS
SYSTOLIC BLOOD PRESSURE: 147 MMHG | WEIGHT: 112.1 LBS | OXYGEN SATURATION: 97 % | HEIGHT: 66 IN | TEMPERATURE: 98.1 F | HEART RATE: 81 BPM | DIASTOLIC BLOOD PRESSURE: 86 MMHG | BODY MASS INDEX: 18.02 KG/M2

## 2025-02-05 DIAGNOSIS — I10 PRIMARY HYPERTENSION: ICD-10-CM

## 2025-02-05 DIAGNOSIS — G47.33 OSA (OBSTRUCTIVE SLEEP APNEA): Primary | ICD-10-CM

## 2025-02-05 PROCEDURE — 3079F DIAST BP 80-89 MM HG: CPT | Performed by: INTERNAL MEDICINE

## 2025-02-05 PROCEDURE — 99204 OFFICE O/P NEW MOD 45 MIN: CPT | Performed by: INTERNAL MEDICINE

## 2025-02-05 PROCEDURE — 1123F ACP DISCUSS/DSCN MKR DOCD: CPT | Performed by: INTERNAL MEDICINE

## 2025-02-05 PROCEDURE — 3077F SYST BP >= 140 MM HG: CPT | Performed by: INTERNAL MEDICINE

## 2025-02-05 ASSESSMENT — SLEEP AND FATIGUE QUESTIONNAIRES
HOW LIKELY ARE YOU TO NOD OFF OR FALL ASLEEP WHEN YOU ARE A PASSENGER IN A CAR FOR AN HOUR WITHOUT A BREAK: WOULD NEVER DOZE
ESS TOTAL SCORE: 6
HOW LIKELY ARE YOU TO NOD OFF OR FALL ASLEEP WHILE SITTING AND TALKING TO SOMEONE: WOULD NEVER DOZE
HOW LIKELY ARE YOU TO NOD OFF OR FALL ASLEEP IN A CAR, WHILE STOPPED FOR A FEW MINUTES IN TRAFFIC: WOULD NEVER DOZE
HOW LIKELY ARE YOU TO NOD OFF OR FALL ASLEEP WHILE SITTING INACTIVE IN A PUBLIC PLACE: SLIGHT CHANCE OF DOZING
HOW LIKELY ARE YOU TO NOD OFF OR FALL ASLEEP WHILE SITTING AND READING: SLIGHT CHANCE OF DOZING
HOW LIKELY ARE YOU TO NOD OFF OR FALL ASLEEP WHILE WATCHING TV: SLIGHT CHANCE OF DOZING
HOW LIKELY ARE YOU TO NOD OFF OR FALL ASLEEP WHILE SITTING QUIETLY AFTER LUNCH WITHOUT ALCOHOL: SLIGHT CHANCE OF DOZING
HOW LIKELY ARE YOU TO NOD OFF OR FALL ASLEEP WHILE LYING DOWN TO REST IN THE AFTERNOON WHEN CIRCUMSTANCES PERMIT: MODERATE CHANCE OF DOZING

## 2025-02-05 NOTE — PROGRESS NOTES
[address] [address]  Tel.  [phone]    Fax. [phone]     [address] [address]  Tel.  [phone]    Fax. [phone] [address] [place] [address]  Tel.  [phone]    Fax. [phone]       Lulu Serrano is a 69 y.o. year old female referred by Halie Salas MD  for evaluation of a sleep disorder.       ASSESSMENT/PLAN:     Diagnosis Orders   1. CINDI (obstructive sleep apnea)  PAT - Home Sleep Test      2. Primary hypertension        3. BMI < 18.5            Patient has a history and examination consistent with the diagnosis of sleep apnea.    Return for follow-up after testing is completed.    * The patient currently has a Moderate Risk for having sleep apnea.  STOP-BANG score 4.    * Sleep testing was ordered for initial evaluation.      Orders Placed This Encounter   Procedures    PAT - Home Sleep Test     Standing Status:   Future     Standing Expiration Date:   8/5/2025     Order Specific Question:   Location For Sleep Study     Answer:   [facility]       * She was provided information on sleep apnea including corresponding risk factors and the importance of proper treatment.     * Treatment options were reviewed in detail. she would like to proceed with PAP therapy. Patient will be seen in follow-up in 6-8 weeks after PAP setup to gauge treatment response and adherence to therapy.     * The patient was counseled regarding proper sleep hygiene (patient advised to delay bedtime until sleepy), with emphasis on ensuring sufficient total sleep time; safe driving and the benefits of exercise and weight loss.      * All of her questions were addressed.    2. Hypertension -  continue on current regimen, she will continue to monitor her BP and follow up with her primary care provider for reevaluation/adjustment of medications if warranted.  I have

## 2025-02-05 NOTE — PATIENT INSTRUCTIONS
[address]  [place] [address]  Tel.  [phone]  Fax. [phone] [address]  [place] [address]  Tel.  [phone]  Fax. [phone] [address] [place] [address]  Tel.  [phone]  Fax. [phone]     Sleep Apnea: After Your Visit  Your Care Instructions  Sleep apnea occurs when you frequently stop breathing for 10 seconds or longer during sleep. It can be mild to severe, based on the number of times per hour that you stop breathing or have slowed breathing. Blocked or narrowed airways in your nose, mouth, or throat can cause sleep apnea. Your airway can become blocked when your throat muscles and tongue relax during sleep.  Sleep apnea is common, occurring in 1 out of 20 individuals.  Individuals having any of the following characteristics should be evaluated and treated right away due to high risk and detrimental consequences from untreated sleep apnea:  Obesity  Congestive Heart failure  Atrial Fibrillation  Uncontrolled Hypertension  Type II Diabetes  Night-time Arrhythmias  Stroke  Pulmonary Hypertension  High-risk Driving Populations (pilots, truck drivers, etc.)  Patients Considering Weight-loss Surgery    How do you know you have sleep apnea?  You probably have sleep apnea if you answer 'yes' to 3 or more of the following questions:  S - Have you been told that you Snore?   T - Are you often Tired during the day?  O - Has anyone Observed you stop breathing while sleeping?  P- Do you have (or are being treated for) high blood Pressure?    B - Are you obese (Body Mass Index > 35)?  A - Is your Age 50 years old or older?  N - Is your Neck size greater than 16 inches?  G - Are you male Gender?  A sleep physician can prescribe a breathing device that prevents tissues in the throat from blocking your airway. Or your doctor may recommend using a dental device (oral breathing device) to help keep your airway open. In some cases, surgery may

## 2025-02-10 ENCOUNTER — OFFICE VISIT (OUTPATIENT)
Dept: PRIMARY CARE CLINIC | Facility: CLINIC | Age: 70
End: 2025-02-10
Payer: COMMERCIAL

## 2025-02-10 VITALS
SYSTOLIC BLOOD PRESSURE: 138 MMHG | HEART RATE: 84 BPM | WEIGHT: 113 LBS | RESPIRATION RATE: 15 BRPM | TEMPERATURE: 97.4 F | HEIGHT: 66 IN | DIASTOLIC BLOOD PRESSURE: 86 MMHG | OXYGEN SATURATION: 99 % | BODY MASS INDEX: 18.16 KG/M2

## 2025-02-10 DIAGNOSIS — R51.9 TEMPLE TENDERNESS: ICD-10-CM

## 2025-02-10 DIAGNOSIS — G47.9 SLEEP DISTURBANCE: ICD-10-CM

## 2025-02-10 DIAGNOSIS — R14.3 FLATULENCE SYMPTOM: ICD-10-CM

## 2025-02-10 DIAGNOSIS — I10 PRIMARY HYPERTENSION: ICD-10-CM

## 2025-02-10 DIAGNOSIS — R06.02 SHORTNESS OF BREATH: ICD-10-CM

## 2025-02-10 DIAGNOSIS — I10 PRIMARY HYPERTENSION: Primary | ICD-10-CM

## 2025-02-10 DIAGNOSIS — R51.9 DAILY HEADACHE: ICD-10-CM

## 2025-02-10 LAB
ALBUMIN SERPL-MCNC: 4 G/DL (ref 3.5–5)
ALBUMIN/GLOB SERPL: 1.3 (ref 1.1–2.2)
ALP SERPL-CCNC: 98 U/L (ref 45–117)
ALT SERPL-CCNC: 15 U/L (ref 12–78)
ANION GAP SERPL CALC-SCNC: 7 MMOL/L (ref 2–12)
AST SERPL-CCNC: 14 U/L (ref 15–37)
BILIRUB SERPL-MCNC: 0.5 MG/DL (ref 0.2–1)
BUN SERPL-MCNC: 12 MG/DL (ref 6–20)
BUN/CREAT SERPL: 15 (ref 12–20)
CALCIUM SERPL-MCNC: 9.4 MG/DL (ref 8.5–10.1)
CHLORIDE SERPL-SCNC: 105 MMOL/L (ref 97–108)
CO2 SERPL-SCNC: 28 MMOL/L (ref 21–32)
CREAT SERPL-MCNC: 0.82 MG/DL (ref 0.55–1.02)
ERYTHROCYTE [DISTWIDTH] IN BLOOD BY AUTOMATED COUNT: 11.9 % (ref 11.5–14.5)
ERYTHROCYTE [SEDIMENTATION RATE] IN BLOOD: 16 MM/HR (ref 0–30)
GLOBULIN SER CALC-MCNC: 3 G/DL (ref 2–4)
GLUCOSE SERPL-MCNC: 100 MG/DL (ref 65–100)
HCT VFR BLD AUTO: 40.2 % (ref 35–47)
HGB BLD-MCNC: 12.5 G/DL (ref 11.5–16)
MCH RBC QN AUTO: 32 PG (ref 26–34)
MCHC RBC AUTO-ENTMCNC: 31.1 G/DL (ref 30–36.5)
MCV RBC AUTO: 102.8 FL (ref 80–99)
NRBC # BLD: 0 K/UL (ref 0–0.01)
NRBC BLD-RTO: 0 PER 100 WBC
PLATELET # BLD AUTO: 271 K/UL (ref 150–400)
PMV BLD AUTO: 10.3 FL (ref 8.9–12.9)
POTASSIUM SERPL-SCNC: 4.5 MMOL/L (ref 3.5–5.1)
PROT SERPL-MCNC: 7 G/DL (ref 6.4–8.2)
RBC # BLD AUTO: 3.91 M/UL (ref 3.8–5.2)
SODIUM SERPL-SCNC: 140 MMOL/L (ref 136–145)
WBC # BLD AUTO: 3.6 K/UL (ref 3.6–11)

## 2025-02-10 PROCEDURE — 99214 OFFICE O/P EST MOD 30 MIN: CPT | Performed by: INTERNAL MEDICINE

## 2025-02-10 PROCEDURE — 3079F DIAST BP 80-89 MM HG: CPT | Performed by: INTERNAL MEDICINE

## 2025-02-10 PROCEDURE — 3075F SYST BP GE 130 - 139MM HG: CPT | Performed by: INTERNAL MEDICINE

## 2025-02-10 PROCEDURE — 1123F ACP DISCUSS/DSCN MKR DOCD: CPT | Performed by: INTERNAL MEDICINE

## 2025-02-10 RX ORDER — LISINOPRIL 30 MG/1
30 TABLET ORAL DAILY
Qty: 30 TABLET | Refills: 1 | Status: SHIPPED | OUTPATIENT
Start: 2025-02-10

## 2025-02-10 RX ORDER — BUTALBITAL, ACETAMINOPHEN AND CAFFEINE 50; 325; 40 MG/1; MG/1; MG/1
1 TABLET ORAL EVERY 6 HOURS PRN
Qty: 30 TABLET | Refills: 0 | Status: SHIPPED | OUTPATIENT
Start: 2025-02-10

## 2025-02-10 RX ORDER — LISINOPRIL 20 MG/1
20 TABLET ORAL DAILY
Qty: 90 TABLET | Refills: 0 | Status: CANCELLED | OUTPATIENT
Start: 2025-02-10 | End: 2025-05-11

## 2025-02-10 SDOH — ECONOMIC STABILITY: FOOD INSECURITY: WITHIN THE PAST 12 MONTHS, YOU WORRIED THAT YOUR FOOD WOULD RUN OUT BEFORE YOU GOT MONEY TO BUY MORE.: NEVER TRUE

## 2025-02-10 SDOH — ECONOMIC STABILITY: FOOD INSECURITY: WITHIN THE PAST 12 MONTHS, THE FOOD YOU BOUGHT JUST DIDN'T LAST AND YOU DIDN'T HAVE MONEY TO GET MORE.: NEVER TRUE

## 2025-02-10 ASSESSMENT — PATIENT HEALTH QUESTIONNAIRE - PHQ9
SUM OF ALL RESPONSES TO PHQ QUESTIONS 1-9: 0
2. FEELING DOWN, DEPRESSED OR HOPELESS: NOT AT ALL
SUM OF ALL RESPONSES TO PHQ QUESTIONS 1-9: 0
SUM OF ALL RESPONSES TO PHQ QUESTIONS 1-9: 0
SUM OF ALL RESPONSES TO PHQ9 QUESTIONS 1 & 2: 0
1. LITTLE INTEREST OR PLEASURE IN DOING THINGS: NOT AT ALL
SUM OF ALL RESPONSES TO PHQ QUESTIONS 1-9: 0

## 2025-02-10 NOTE — PROGRESS NOTES
Lulu Serrano (:  1955) is a 69 y.o. female,Established patient, here for evaluation of the following chief complaint(s):  Headache (Over a month now, usually daily occurrence )    [facility] PRIMARY [facility]  [address] [address]    History of Present Illness  The patient presents for evaluation of headaches, hypertension, shortness of breath, and flatulence.    She was diagnosed with a viral infection approximately 1 month ago, which was neither COVID-19 nor influenza, as per the tests conducted. She has been experiencing daily headaches, described as pressure-like, which are transient but recurrent. The location of the headache varies, sometimes presenting at the back of her head, while at other times it originates from the front and radiates down into her neck. She reports tenderness in the temporal region. She has a history of migraines in her 20s but asserts that her current headaches are different. She also reports changes in her vision and consulted an ophthalmologist 2 weeks ago, who adjusted her glasses prescription due to a slight change but did not attribute the vision changes to her headaches. She typically manages her headaches with Tylenol, which provides some relief.    She has been monitoring her blood pressure since it was found to be elevated during a visit for a cold. Her blood pressure readings have been inconsistent, initially remaining high for a few days before normalizing, and then spiking again after a work meeting. She is currently on lisinopril 20 mg and reports no associated dry cough.    She also reports increased shortness of breath, even at rest, which intensifies when she lowers her head or ascends stairs.    She has been experiencing increased flatulence, which she finds embarrassing as it often occurs spontaneously, even while walking. She also reports bloating but does not experience any cramping or 
0.7mL 10/10/2022, 10/10/2023    Influenza, FLUZONE High Dose, (age 65 y+), IM, Trivalent PF, 0.5mL 10/20/2020    Pneumococcal, PCV-13, PREVNAR 13, (age 6w+), IM, 0.5mL 10/20/2020    Pneumococcal, PPSV23, PNEUMOVAX 23, (age 2y+), SC/IM, 0.5mL 09/30/2021    TDaP, ADACEL (age 10y-64y), BOOSTRIX (age 10y+), IM, 0.5mL 03/28/2017    Td vaccine (adult) 08/31/2004    Zoster Recombinant (Shingrix) 09/28/2016, 07/15/2021, 10/14/2021

## 2025-02-18 ENCOUNTER — PROCEDURE VISIT (OUTPATIENT)
Age: 70
End: 2025-02-18

## 2025-02-18 DIAGNOSIS — G47.9 SLEEP DIFFICULTIES: Primary | ICD-10-CM

## 2025-02-18 DIAGNOSIS — I10 PRIMARY HYPERTENSION: ICD-10-CM

## 2025-02-18 RX ORDER — LISINOPRIL 30 MG/1
30 TABLET ORAL DAILY
Qty: 90 TABLET | Refills: 0 | Status: SHIPPED | OUTPATIENT
Start: 2025-02-18 | End: 2025-05-19

## 2025-02-18 NOTE — TELEPHONE ENCOUNTER
Patient called to see if Dr Salas could change her prescription for Lisinopril 30mg from a 30 day supply to a 90 day supply with 1 refill because her insurance will only cover a 90 day supply at [facility] on [address]

## 2025-02-18 NOTE — PROGRESS NOTES
Lulu Serrano is seen today to receive a WatchPAT home sleep apnea testing (HSAT) device.     The WatchPAT HSAT Agreement was reviewed and endorsed by patient.  General information regarding operation of the HSAT device was provided.  Patient was advised to watch the WatchPAT instructional video.  Patient was advised to contact patient support for any problems using the device.  Patient was advised to complete the Morning Questionnaire after awakening/ending the test.     There were no vitals taken for this visit.     Patient will return the home sleep testing unit by noon unless otherwise approved.  Patient will be contacted once the results have been reviewed by the physician, typically within 10-15 business days.     FixstarsT Serial Number [id number]

## 2025-03-02 DIAGNOSIS — E78.2 COMBINED HYPERLIPIDEMIA: ICD-10-CM

## 2025-03-02 RX ORDER — SIMVASTATIN 40 MG
40 TABLET ORAL NIGHTLY
Qty: 90 TABLET | Refills: 0 | Status: SHIPPED | OUTPATIENT
Start: 2025-03-02

## 2025-03-05 ENCOUNTER — CLINICAL DOCUMENTATION (OUTPATIENT)
Age: 70
End: 2025-03-05

## 2025-03-05 DIAGNOSIS — G47.33 OSA (OBSTRUCTIVE SLEEP APNEA): Primary | ICD-10-CM

## 2025-03-05 NOTE — PROGRESS NOTES
Lulu Serrano is to be contacted by sleep technologists regarding results of WatchPAT Testing which was indicative of an average pAHI 3% of 17.6 and pAHI 4% of 4.3 per hour.  SpO2 andrew was 79% and SpO2 of < 88% was 0.0 minutes.      An APAP prescription has been written and patient will be contacted by office staff regarding follow-up  in 2-3 months after initiation of therapy.    Encounter Diagnosis   Name Primary?    CINDI (obstructive sleep apnea) Yes       Orders Placed This Encounter   Procedures    DME Order for (Specify) as OP     - DME device ordered - ResMed Device with Heated Humidifer  / .   - Diagnosis: Obstructive Sleep Apnea (G47.33)  - Length of Need: Lifetime    Pressure Settin - 15 cmH2O     Nasal Cushion (Replace) 2 per month.     Nasal Interface Mask 1 every 3 months.    Headgear 1 every 6 months.     Filter(s) Disposable 2 per month.   Filter(s) Non-Disposable 1 every 6 months.      Water Chamber for Humidifier (Replace) 1 every 6 months.   Tubing with heating element 1 every 3 months.    Perform Mask Fitting per patient preference and comfort - replace as above.      Thor Baugh MD, FAA; NPI: [id number]  Electronically signed. 3/5/2025

## 2025-03-06 ENCOUNTER — CLINICAL DOCUMENTATION (OUTPATIENT)
Age: 70
End: 2025-03-06

## 2025-03-14 ENCOUNTER — OFFICE VISIT (OUTPATIENT)
Age: 70
End: 2025-03-14
Payer: COMMERCIAL

## 2025-03-14 ENCOUNTER — TELEPHONE (OUTPATIENT)
Age: 70
End: 2025-03-14

## 2025-03-14 VITALS
HEART RATE: 72 BPM | WEIGHT: 110.2 LBS | DIASTOLIC BLOOD PRESSURE: 74 MMHG | OXYGEN SATURATION: 98 % | HEIGHT: 66 IN | BODY MASS INDEX: 17.71 KG/M2 | SYSTOLIC BLOOD PRESSURE: 130 MMHG

## 2025-03-14 DIAGNOSIS — I10 ESSENTIAL HYPERTENSION WITH GOAL BLOOD PRESSURE LESS THAN 140/90: Primary | ICD-10-CM

## 2025-03-14 DIAGNOSIS — R06.02 SHORTNESS OF BREATH: ICD-10-CM

## 2025-03-14 LAB — NT PRO BNP: 25 PG/ML

## 2025-03-14 PROCEDURE — 1123F ACP DISCUSS/DSCN MKR DOCD: CPT | Performed by: INTERNAL MEDICINE

## 2025-03-14 PROCEDURE — 99204 OFFICE O/P NEW MOD 45 MIN: CPT | Performed by: INTERNAL MEDICINE

## 2025-03-14 PROCEDURE — 3078F DIAST BP <80 MM HG: CPT | Performed by: INTERNAL MEDICINE

## 2025-03-14 PROCEDURE — 3075F SYST BP GE 130 - 139MM HG: CPT | Performed by: INTERNAL MEDICINE

## 2025-03-14 PROCEDURE — 93000 ELECTROCARDIOGRAM COMPLETE: CPT | Performed by: INTERNAL MEDICINE

## 2025-03-14 RX ORDER — CYANOCOBALAMIN (VITAMIN B-12) 1000 MCG
TABLET ORAL DAILY
COMMUNITY

## 2025-03-14 RX ORDER — VALACYCLOVIR HYDROCHLORIDE 500 MG/1
TABLET, FILM COATED ORAL
COMMUNITY

## 2025-03-14 RX ORDER — FUROSEMIDE 20 MG/1
20 TABLET ORAL DAILY PRN
Qty: 60 TABLET | Refills: 3 | Status: SHIPPED | OUTPATIENT
Start: 2025-03-14

## 2025-03-14 ASSESSMENT — PATIENT HEALTH QUESTIONNAIRE - PHQ9
SUM OF ALL RESPONSES TO PHQ QUESTIONS 1-9: 1
2. FEELING DOWN, DEPRESSED OR HOPELESS: SEVERAL DAYS
SUM OF ALL RESPONSES TO PHQ QUESTIONS 1-9: 1
SUM OF ALL RESPONSES TO PHQ QUESTIONS 1-9: 1
1. LITTLE INTEREST OR PLEASURE IN DOING THINGS: NOT AT ALL
SUM OF ALL RESPONSES TO PHQ QUESTIONS 1-9: 1

## 2025-03-14 NOTE — TELEPHONE ENCOUNTER
Scheduling called in stating that the order for FULL PFT STUDY WITH BRONCHODILATOR is needing to be changed from Clinic performed to Ancillary . Thank you !

## 2025-03-14 NOTE — PROGRESS NOTES
1. Have you been to the ER, urgent care clinic since your last visit?  Hospitalized since your last visit?No    2. Have you seen or consulted any other health care providers outside of the [facility] since your last visit?  Include any pap smears or colon screening. No    
Conjunctiva/sclera: Conjunctivae normal.   Cardiovascular:      Rate and Rhythm: Normal rate and regular rhythm.      Pulses: Normal pulses.      Heart sounds: Normal heart sounds.   Pulmonary:      Effort: Pulmonary effort is normal.      Breath sounds: Normal breath sounds.   Musculoskeletal:         General: Normal range of motion.      Cervical back: Normal range of motion.   Skin:     General: Skin is warm.   Neurological:      General: No focal deficit present.      Mental Status: She is alert and oriented to person, place, and time.   Psychiatric:         Mood and Affect: Mood normal.               CARDIAC STUDIES        Results  Laboratory Studies  LDL cholesterol was borderline at 115 mg/dL. Thyroid levels were normal.    Imaging  Last echo done in 2022 shows normal left ventricular systolic and diastolic function.    Testing  EKG done on 03/14/2025 shows normal sinus rhythm.         09/06/19 09/09/2019  4:37 PM (Final)    Narrative  This is a summary report. The complete report is available in the patient's medical record. If you cannot access the medical record, please contact the sending organization for a detailed fax or copy.    · Low risk [facility] treadmill score.  · Negative stress test.    Signed by: Ez Jacinto on 9/9/2019  4:37 PM           LABORATORY DATA        Lab Results   Component Value Date/Time    WBC 3.6 02/10/2025 11:26 AM    HGB 12.5 02/10/2025 11:26 AM    HCT 40.2 02/10/2025 11:26 AM     02/10/2025 11:26 AM    .8 02/10/2025 11:26 AM        Lab Results   Component Value Date/Time     02/10/2025 11:26 AM    K 4.5 02/10/2025 11:26 AM     02/10/2025 11:26 AM    CO2 28 02/10/2025 11:26 AM    BUN 12 02/10/2025 11:26 AM    GFRAA >60 11/02/2021 04:37 PM    GLOB 3.0 02/10/2025 11:26 AM    ALT 15 02/10/2025 11:26 AM        Lab Results   Component Value Date    CHOL 194 09/17/2024    TRIG 66 09/17/2024    HDL 65 09/17/2024    VLDL 13.2 09/17/2024    CHOLHDLRATIO 3.0

## 2025-03-14 NOTE — PATIENT INSTRUCTIONS
and a baby aspirin daily    - Lasix prescribed as a fluid pill to be taken as needed if you experience shortness of breath    - Tests and Monitoring:    - An echocardiogram will be conducted to rule out congestive heart failure    - An exercise stress test is scheduled to check for any heart blockages    - A ProBNP test will be conducted to further assess for congestive heart failure    - A pulmonary function test is recommended to evaluate lung function    - Lifestyle and Activity:    - Maintain a healthy lifestyle, including reducing fatty foods    - Engage in regular exercise, especially as you plan to join the [facility] in [place] after intermediate    - Continue to monitor sleep apnea symptoms and discuss with your sleep specialist    - Continue monitoring headaches, noting Tylenol's effectiveness compared to prescribed medication    - Follow-Up Care:    - We will meet again in 4 months for a follow-up    Your health and well-being are our top priority. Please feel free to reach out if you have any questions or concerns before our next appointment.    Sincerely,    Dr. Tyrone Jordan MD  Cardiology

## 2025-03-17 ENCOUNTER — RESULTS FOLLOW-UP (OUTPATIENT)
Age: 70
End: 2025-03-17

## 2025-04-16 ENCOUNTER — HOSPITAL ENCOUNTER (OUTPATIENT)
Facility: HOSPITAL | Age: 70
Discharge: HOME | End: 2025-04-19
Attending: INTERNAL MEDICINE
Payer: COMMERCIAL

## 2025-04-16 ENCOUNTER — RESULTS FOLLOW-UP (OUTPATIENT)
Age: 70
End: 2025-04-16

## 2025-04-16 DIAGNOSIS — R06.02 SHORTNESS OF BREATH: Primary | ICD-10-CM

## 2025-04-16 DIAGNOSIS — R06.02 SHORTNESS OF BREATH: ICD-10-CM

## 2025-04-16 DIAGNOSIS — R94.2 ABNORMAL PFT: ICD-10-CM

## 2025-04-16 PROCEDURE — 94640 AIRWAY INHALATION TREATMENT: CPT

## 2025-04-16 PROCEDURE — 94060 EVALUATION OF WHEEZING: CPT

## 2025-04-16 PROCEDURE — 94726 PLETHYSMOGRAPHY LUNG VOLUMES: CPT

## 2025-04-16 PROCEDURE — 94729 DIFFUSING CAPACITY: CPT

## 2025-04-16 PROCEDURE — 6360000002 HC RX W HCPCS: Performed by: INTERNAL MEDICINE

## 2025-04-16 RX ORDER — ALBUTEROL SULFATE 0.83 MG/ML
2.5 SOLUTION RESPIRATORY (INHALATION)
Status: COMPLETED | OUTPATIENT
Start: 2025-04-16 | End: 2025-04-16

## 2025-04-16 RX ADMIN — ALBUTEROL SULFATE 2.5 MG: 2.5 SOLUTION RESPIRATORY (INHALATION) at 10:00

## 2025-04-16 NOTE — PROCEDURES
Pulmonary Function Test        PATIENT ID: Lulu Serrano  MRN: [id number]   YOB: 1955    DATE OF ADMISSION: [id number]    PRIMARY CARE PROVIDER: Halie Salas MD       Spirometry:  Spirometry is normal.    Bronchodilator response:  No significant improvement with bronchodilator therapy    Volumes:  Total lung capacity is within normal limits    Diffusion:  Diffusing capacity is normal.    Flow-Volume:  The flow-volume loop is compatible with small airways obstruction.      Signed:   Aldo Connell MD  4/16/2025  3:52 PM

## 2025-04-17 NOTE — RESULT ENCOUNTER NOTE
Pulmonary function test shows small airway obstruction which could also be reason for his shortness of breath can you please refer the patient to Dr. Aldo Connell pulmonologist for further workup for pulmonary causes of shortness of breath

## 2025-04-20 DIAGNOSIS — J45.909 ASTHMA DUE TO ENVIRONMENTAL ALLERGIES: ICD-10-CM

## 2025-04-20 DIAGNOSIS — E78.2 COMBINED HYPERLIPIDEMIA: ICD-10-CM

## 2025-04-21 ENCOUNTER — TELEPHONE (OUTPATIENT)
Age: 70
End: 2025-04-21

## 2025-04-21 RX ORDER — FLUTICASONE PROPIONATE AND SALMETEROL 50; 250 UG/1; UG/1
POWDER RESPIRATORY (INHALATION)
Qty: 60 EACH | Refills: 3 | Status: SHIPPED | OUTPATIENT
Start: 2025-04-21

## 2025-04-21 RX ORDER — SIMVASTATIN 40 MG
40 TABLET ORAL NIGHTLY
Qty: 90 TABLET | Refills: 0 | OUTPATIENT
Start: 2025-04-21

## 2025-04-21 NOTE — TELEPHONE ENCOUNTER
Patient scheduled for a eye surgery with [facility] 5/2. Patient was had a echo and nuclear back in march. Patient is asking if she can have the procedure or will an appointment be needed?    # [phone]

## 2025-04-21 NOTE — TELEPHONE ENCOUNTER
Called [facility] and left a message for procedure  to find out what patient is scheduled for on 5/2. Message left for return call

## 2025-04-22 ENCOUNTER — TELEPHONE (OUTPATIENT)
Dept: PRIMARY CARE CLINIC | Facility: CLINIC | Age: 70
End: 2025-04-22

## 2025-04-22 DIAGNOSIS — J45.909 ASTHMA DUE TO SEASONAL ALLERGIES: Primary | ICD-10-CM

## 2025-04-22 NOTE — TELEPHONE ENCOUNTER
ADVAIR DISKUS  PA denied    Budesinide-formoterol, fluticasone-salmeterol, breyna, wixela inhub, breo ellipta

## 2025-04-23 NOTE — TELEPHONE ENCOUNTER
[facility] called back; they said she does not need clearance specifically from Dr. Jordan or an appointment with us. She already is seeing one their nurse practitioners for a preop clearance appointment. Sent patient [facility] message as well.

## 2025-05-07 ENCOUNTER — PATIENT MESSAGE (OUTPATIENT)
Dept: PRIMARY CARE CLINIC | Facility: CLINIC | Age: 70
End: 2025-05-07

## 2025-05-07 DIAGNOSIS — E78.2 COMBINED HYPERLIPIDEMIA: ICD-10-CM

## 2025-05-07 DIAGNOSIS — F51.02 INSOMNIA DUE TO STRESS: ICD-10-CM

## 2025-05-07 RX ORDER — SIMVASTATIN 40 MG
40 TABLET ORAL NIGHTLY
Qty: 90 TABLET | Refills: 0 | Status: SHIPPED | OUTPATIENT
Start: 2025-05-07

## 2025-05-07 RX ORDER — VALACYCLOVIR HYDROCHLORIDE 500 MG/1
500 TABLET, FILM COATED ORAL PRN
Qty: 90 TABLET | Refills: 0 | Status: SHIPPED | OUTPATIENT
Start: 2025-05-07

## 2025-05-07 RX ORDER — AMITRIPTYLINE HYDROCHLORIDE 10 MG/1
10 TABLET ORAL NIGHTLY
Qty: 90 TABLET | Refills: 0 | Status: SHIPPED | OUTPATIENT
Start: 2025-05-07

## 2025-05-20 ENCOUNTER — OFFICE VISIT (OUTPATIENT)
Age: 70
End: 2025-05-20

## 2025-05-20 ENCOUNTER — RESULTS FOLLOW-UP (OUTPATIENT)
Age: 70
End: 2025-05-20

## 2025-05-20 VITALS
DIASTOLIC BLOOD PRESSURE: 88 MMHG | BODY MASS INDEX: 18.09 KG/M2 | TEMPERATURE: 99.7 F | OXYGEN SATURATION: 97 % | WEIGHT: 112.1 LBS | HEART RATE: 93 BPM | RESPIRATION RATE: 16 BRPM | SYSTOLIC BLOOD PRESSURE: 137 MMHG

## 2025-05-20 DIAGNOSIS — J45.901 ASTHMA EXACERBATION, MILD: Primary | ICD-10-CM

## 2025-05-20 DIAGNOSIS — R06.2 WHEEZING: ICD-10-CM

## 2025-05-20 DIAGNOSIS — R06.00 DYSPNEA, UNSPECIFIED TYPE: ICD-10-CM

## 2025-05-20 LAB
Lab: NORMAL
PERFORMING INSTRUMENT: NORMAL
QC PASS/FAIL: NORMAL
SARS-COV-2, POC: NORMAL

## 2025-05-20 RX ORDER — BENZONATATE 200 MG/1
CAPSULE ORAL
COMMUNITY

## 2025-05-20 RX ORDER — PREDNISONE 20 MG/1
40 TABLET ORAL DAILY
Qty: 10 TABLET | Refills: 0 | Status: SHIPPED | OUTPATIENT
Start: 2025-05-20 | End: 2025-05-25

## 2025-05-20 RX ORDER — IPRATROPIUM BROMIDE AND ALBUTEROL SULFATE 2.5; .5 MG/3ML; MG/3ML
1 SOLUTION RESPIRATORY (INHALATION) ONCE
Status: COMPLETED | OUTPATIENT
Start: 2025-05-20 | End: 2025-05-20

## 2025-05-20 RX ORDER — PREDNISOLONE/MOXIFLOX/BROMFEN 1 %-0.5 %
SUSPENSION, DROPS(FINAL DOSAGE FORM)(ML) OPHTHALMIC (EYE)
COMMUNITY
Start: 2025-04-15

## 2025-05-20 RX ORDER — ALBUTEROL SULFATE 90 UG/1
INHALANT RESPIRATORY (INHALATION)
COMMUNITY
Start: 2025-05-20

## 2025-05-20 RX ORDER — ALBUTEROL SULFATE 0.83 MG/ML
2.5 SOLUTION RESPIRATORY (INHALATION) 4 TIMES DAILY PRN
Qty: 120 EACH | Refills: 3 | Status: SHIPPED | OUTPATIENT
Start: 2025-05-20

## 2025-05-20 RX ORDER — DEXAMETHASONE SODIUM PHOSPHATE 10 MG/ML
10 INJECTION, SOLUTION INTRA-ARTICULAR; INTRALESIONAL; INTRAMUSCULAR; INTRAVENOUS; SOFT TISSUE ONCE
Status: COMPLETED | OUTPATIENT
Start: 2025-05-20 | End: 2025-05-20

## 2025-05-20 RX ORDER — BUDESONIDE AND FORMOTEROL FUMARATE DIHYDRATE 160; 4.5 UG/1; UG/1
2 AEROSOL RESPIRATORY (INHALATION) 2 TIMES DAILY
COMMUNITY
Start: 2025-04-23

## 2025-05-20 RX ADMIN — IPRATROPIUM BROMIDE AND ALBUTEROL SULFATE 1 DOSE: 2.5; .5 SOLUTION RESPIRATORY (INHALATION) at 10:59

## 2025-05-20 RX ADMIN — DEXAMETHASONE SODIUM PHOSPHATE 10 MG: 10 INJECTION, SOLUTION INTRA-ARTICULAR; INTRALESIONAL; INTRAMUSCULAR; INTRAVENOUS; SOFT TISSUE at 10:48

## 2025-05-20 ASSESSMENT — ENCOUNTER SYMPTOMS: COUGH: 1

## 2025-05-20 NOTE — PATIENT INSTRUCTIONS
10 mg of Decadron and a DuoNeb administered here in clinic  We will test her for COVID, if COVID test is negative we will order a chest x-ray    COVID test is negative    I will call you and add an antibiotic if the radiologist reads the chest x-ray as a pneumonia    Prednisone 20 mg tabs: Take 2 tablets by mouth once daily for 5 days (start this tomorrow)    I sent a prescription for a nebulizer machine and tubing, and albuterol nebulizer solution.  Today use your nebulizer and albuterol every 3 hours or so.  Tomorrow you can move to every 4 and as needed

## 2025-05-20 NOTE — PROGRESS NOTES
Lulu Serrano (:  1955) is a 69 y.o. female,Established patient, here for evaluation of the following chief complaint(s):  Cough (Cough, body chills, headaches x 2 days)      Assessment & Plan :  Visit Diagnoses and Associated Orders         Asthma exacerbation, mild    -  Primary    predniSONE (DELTASONE) 20 MG tablet [6496]      Nebulizers (COMPRESSOR/NEBULIZER) MISC [20303]      Respiratory Therapy Supplies (NEBULIZER MASK ADULT/TUBING) MISC [890456]      albuterol (PROVENTIL) (2.5 MG/3ML) 0.083% nebulizer solution [250]             Wheezing        ipratropium 0.5 mg-albuterol 2.5 mg (DUONEB) nebulizer solution 1 Dose [14371]      dexAMETHasone (DECADRON) Oral 10 mg [2331]      POCT COVID-19, Antigen [AGI745 Custom]      XR CHEST PA/LAT [70990 CPT(R)]   - Future Order           Dyspnea, unspecified type        ipratropium 0.5 mg-albuterol 2.5 mg (DUONEB) nebulizer solution 1 Dose [23103]      dexAMETHasone (DECADRON) Oral 10 mg [2331]      POCT COVID-19, Antigen [PVA513 Custom]      XR CHEST PA/LAT [00294 CPT(R)]   - Future Order         ORDERS WITHOUT AN ASSOCIATED DIAGNOSIS    albuterol sulfate HFA (PROVENTIL;VENTOLIN;PROAIR) 108 (90 Base) MCG/ACT inhaler [33121]      benzonatate (TESSALON) 200 MG capsule [17062]      budesonide-formoterol (SYMBICORT) 160-4.5 MCG/ACT AERO [94449]      Prednisol Ace-Moxiflox-Bromfen 1-0.5-0.075 % SUSP [797564]      vitamin E 100 units capsule [2398]              10 mg of Decadron and a DuoNeb administered here in clinic  We will test her for COVID, if COVID test is negative we will order a chest x-ray    COVID test is negative    She improved with a DuoNeb.  She is moving air much better, still with some scattered wheezing, but much improved    I will call you and add an antibiotic if the radiologist reads the chest x-ray as a pneumonia    Prednisone 20 mg tabs: Take 2 tablets by mouth once daily for 5 days (start this tomorrow)    I sent a prescription for a

## 2025-05-27 ENCOUNTER — TELEPHONE (OUTPATIENT)
Dept: PRIMARY CARE CLINIC | Facility: CLINIC | Age: 70
End: 2025-05-27

## 2025-05-27 NOTE — TELEPHONE ENCOUNTER
Called and spoke with the patient. Patient reports that she went to a [facility] on 5/20. They told her that she was having an asthma exacerbation. They provided a steroid pack and neb treatment as well as two types of inhalers and Tessalon tablets.  Patient reports that she is not feeling better. She is having a productive cough with yellow thick mucus now.   Patient is asking for an antibiotic. Told her I will send a message to Dr Salas

## 2025-05-27 NOTE — TELEPHONE ENCOUNTER
Patient called saying she went to an urgent care on 5/20 but she is feeling so much worse with a bad cough and coughing up a lot of thick mucus in her chest now and feeling really bad she said.  Patient would like a call back at # [phone]

## 2025-05-28 ENCOUNTER — OFFICE VISIT (OUTPATIENT)
Dept: PRIMARY CARE CLINIC | Facility: CLINIC | Age: 70
End: 2025-05-28
Payer: COMMERCIAL

## 2025-05-28 VITALS
RESPIRATION RATE: 20 BRPM | DIASTOLIC BLOOD PRESSURE: 74 MMHG | HEIGHT: 66 IN | BODY MASS INDEX: 18 KG/M2 | OXYGEN SATURATION: 100 % | TEMPERATURE: 98.2 F | WEIGHT: 112 LBS | SYSTOLIC BLOOD PRESSURE: 128 MMHG | HEART RATE: 88 BPM

## 2025-05-28 DIAGNOSIS — R06.02 SHORTNESS OF BREATH: Primary | ICD-10-CM

## 2025-05-28 DIAGNOSIS — I51.7 CARDIOMEGALY: ICD-10-CM

## 2025-05-28 DIAGNOSIS — G47.30 SLEEP APNEA IN ADULT: ICD-10-CM

## 2025-05-28 DIAGNOSIS — J45.41 MODERATE PERSISTENT ASTHMATIC BRONCHITIS WITH ACUTE EXACERBATION: ICD-10-CM

## 2025-05-28 PROCEDURE — 1123F ACP DISCUSS/DSCN MKR DOCD: CPT | Performed by: INTERNAL MEDICINE

## 2025-05-28 PROCEDURE — 3078F DIAST BP <80 MM HG: CPT | Performed by: INTERNAL MEDICINE

## 2025-05-28 PROCEDURE — 3074F SYST BP LT 130 MM HG: CPT | Performed by: INTERNAL MEDICINE

## 2025-05-28 PROCEDURE — 99214 OFFICE O/P EST MOD 30 MIN: CPT | Performed by: INTERNAL MEDICINE

## 2025-05-28 RX ORDER — PREDNISONE 20 MG/1
20 TABLET ORAL DAILY
Qty: 5 TABLET | Refills: 0 | Status: SHIPPED | OUTPATIENT
Start: 2025-05-28 | End: 2025-06-02

## 2025-05-28 RX ORDER — AZITHROMYCIN 250 MG/1
TABLET, FILM COATED ORAL
Qty: 6 TABLET | Refills: 0 | Status: SHIPPED | OUTPATIENT
Start: 2025-05-28 | End: 2025-06-07

## 2025-05-28 ASSESSMENT — PATIENT HEALTH QUESTIONNAIRE - PHQ9
SUM OF ALL RESPONSES TO PHQ QUESTIONS 1-9: 0
2. FEELING DOWN, DEPRESSED OR HOPELESS: NOT AT ALL
SUM OF ALL RESPONSES TO PHQ QUESTIONS 1-9: 0
SUM OF ALL RESPONSES TO PHQ QUESTIONS 1-9: 0
1. LITTLE INTEREST OR PLEASURE IN DOING THINGS: NOT AT ALL
SUM OF ALL RESPONSES TO PHQ QUESTIONS 1-9: 0

## 2025-05-28 NOTE — PROGRESS NOTES
Have you been to the ER, urgent care clinic since your last visit?  Hospitalized since your last visit?   NO      Have you seen or consulted any other health care providers outside our system since your last visit?   Urgent Care       Chief Complaint   Patient presents with    Sick visit     Was told she has asthma.     Cough    Shortness of Breath       
Intelligence will be utilized during this visit to record, process the conversation to generate a clinical note, and support improvement of the AI technology. The patient (or guardian, if applicable) and other individuals in attendance at the appointment consented to the use of AI, including the recording.    Lulu was seen today for sick visit, cough and shortness of breath.                Assessment & Plan                          An electronic signature was used to authenticate this note.    --Halie Salas MD

## 2025-06-05 DIAGNOSIS — I10 PRIMARY HYPERTENSION: ICD-10-CM

## 2025-06-05 RX ORDER — LISINOPRIL 30 MG/1
30 TABLET ORAL DAILY
Qty: 90 TABLET | Refills: 0 | Status: SHIPPED | OUTPATIENT
Start: 2025-06-05

## 2025-06-06 ENCOUNTER — HOSPITAL ENCOUNTER (OUTPATIENT)
Facility: HOSPITAL | Age: 70
Discharge: HOME | End: 2025-06-09
Attending: INTERNAL MEDICINE
Payer: COMMERCIAL

## 2025-06-06 DIAGNOSIS — I51.7 CARDIOMEGALY: ICD-10-CM

## 2025-06-06 DIAGNOSIS — R06.02 SHORTNESS OF BREATH: ICD-10-CM

## 2025-06-06 LAB — CREAT BLD-MCNC: 0.9 MG/DL (ref 0.6–1.3)

## 2025-06-06 PROCEDURE — 82565 ASSAY OF CREATININE: CPT

## 2025-06-06 PROCEDURE — 6360000004 HC RX CONTRAST MEDICATION: Performed by: INTERNAL MEDICINE

## 2025-06-06 PROCEDURE — 71260 CT THORAX DX C+: CPT

## 2025-06-06 RX ORDER — IOPAMIDOL 755 MG/ML
100 INJECTION, SOLUTION INTRAVASCULAR
Status: COMPLETED | OUTPATIENT
Start: 2025-06-06 | End: 2025-06-06

## 2025-06-06 RX ADMIN — IOPAMIDOL 100 ML: 755 INJECTION, SOLUTION INTRAVENOUS at 10:24

## 2025-06-09 ENCOUNTER — RESULTS FOLLOW-UP (OUTPATIENT)
Dept: PRIMARY CARE CLINIC | Facility: CLINIC | Age: 70
End: 2025-06-09

## 2025-06-24 DIAGNOSIS — E78.2 COMBINED HYPERLIPIDEMIA: ICD-10-CM

## 2025-06-24 DIAGNOSIS — I10 PRIMARY HYPERTENSION: ICD-10-CM

## 2025-06-24 DIAGNOSIS — F51.02 INSOMNIA DUE TO STRESS: ICD-10-CM

## 2025-06-24 RX ORDER — FUROSEMIDE 20 MG/1
20 TABLET ORAL DAILY PRN
Qty: 90 TABLET | Refills: 1 | Status: SHIPPED | OUTPATIENT
Start: 2025-06-24

## 2025-06-24 RX ORDER — SIMVASTATIN 40 MG
40 TABLET ORAL NIGHTLY
Qty: 90 TABLET | Refills: 0 | OUTPATIENT
Start: 2025-06-24

## 2025-06-24 RX ORDER — VALACYCLOVIR HYDROCHLORIDE 500 MG/1
500 TABLET, FILM COATED ORAL PRN
Qty: 90 TABLET | Refills: 0 | OUTPATIENT
Start: 2025-06-24

## 2025-06-24 RX ORDER — LISINOPRIL 30 MG/1
30 TABLET ORAL DAILY
Qty: 90 TABLET | Refills: 0 | OUTPATIENT
Start: 2025-06-24

## 2025-06-24 RX ORDER — AMITRIPTYLINE HYDROCHLORIDE 10 MG/1
10 TABLET ORAL NIGHTLY
Qty: 90 TABLET | Refills: 0 | OUTPATIENT
Start: 2025-06-24

## 2025-06-24 NOTE — TELEPHONE ENCOUNTER
Per VO of MD    LOV: 3/14/2025     Future Appointments   Date Time Provider Department Center   7/21/2025  9:00 AM Tyrone Deutsch MD [facility]       Requested Prescriptions     Signed Prescriptions Disp Refills    furosemide (LASIX) 20 MG tablet 90 tablet 1     Sig: TAKE 1 TABLET BY MOUTH DAILY AS NEEDED (SHORTNESS OF BREATH)     Authorizing Provider: TYRONE DEUTSCH     Ordering User: AGUILAR PEREZ

## 2025-08-04 DIAGNOSIS — F51.02 INSOMNIA DUE TO STRESS: ICD-10-CM

## 2025-08-04 RX ORDER — AMITRIPTYLINE HYDROCHLORIDE 10 MG/1
10 TABLET ORAL NIGHTLY
Qty: 90 TABLET | Refills: 0 | Status: SHIPPED | OUTPATIENT
Start: 2025-08-04

## 2025-08-19 ENCOUNTER — OFFICE VISIT (OUTPATIENT)
Age: 70
End: 2025-08-19
Payer: MEDICARE

## 2025-08-19 VITALS
HEART RATE: 66 BPM | BODY MASS INDEX: 18.16 KG/M2 | OXYGEN SATURATION: 99 % | SYSTOLIC BLOOD PRESSURE: 100 MMHG | DIASTOLIC BLOOD PRESSURE: 80 MMHG | RESPIRATION RATE: 16 BRPM | WEIGHT: 113 LBS | HEIGHT: 66 IN

## 2025-08-19 DIAGNOSIS — R06.02 SHORTNESS OF BREATH: ICD-10-CM

## 2025-08-19 DIAGNOSIS — G47.33 OSA (OBSTRUCTIVE SLEEP APNEA): ICD-10-CM

## 2025-08-19 DIAGNOSIS — I10 ESSENTIAL HYPERTENSION WITH GOAL BLOOD PRESSURE LESS THAN 140/90: ICD-10-CM

## 2025-08-19 DIAGNOSIS — E78.5 DYSLIPIDEMIA: Primary | ICD-10-CM

## 2025-08-19 PROCEDURE — 99214 OFFICE O/P EST MOD 30 MIN: CPT

## 2025-08-19 PROCEDURE — 3079F DIAST BP 80-89 MM HG: CPT

## 2025-08-19 PROCEDURE — 1123F ACP DISCUSS/DSCN MKR DOCD: CPT

## 2025-08-19 PROCEDURE — 3074F SYST BP LT 130 MM HG: CPT

## 2025-08-19 PROCEDURE — 1126F AMNT PAIN NOTED NONE PRSNT: CPT

## 2025-08-19 PROCEDURE — 1159F MED LIST DOCD IN RCRD: CPT

## 2025-08-19 ASSESSMENT — PATIENT HEALTH QUESTIONNAIRE - PHQ9
1. LITTLE INTEREST OR PLEASURE IN DOING THINGS: NOT AT ALL
SUM OF ALL RESPONSES TO PHQ QUESTIONS 1-9: 0
2. FEELING DOWN, DEPRESSED OR HOPELESS: NOT AT ALL

## 2025-08-22 DIAGNOSIS — E78.2 COMBINED HYPERLIPIDEMIA: ICD-10-CM

## 2025-08-23 RX ORDER — SIMVASTATIN 40 MG
40 TABLET ORAL NIGHTLY
Qty: 90 TABLET | Refills: 0 | Status: SHIPPED | OUTPATIENT
Start: 2025-08-23